# Patient Record
Sex: FEMALE | Race: WHITE | Employment: OTHER | URBAN - METROPOLITAN AREA
[De-identification: names, ages, dates, MRNs, and addresses within clinical notes are randomized per-mention and may not be internally consistent; named-entity substitution may affect disease eponyms.]

---

## 2017-03-28 PROBLEM — E88.819 INSULIN RESISTANCE: Status: ACTIVE | Noted: 2017-03-28

## 2017-03-28 PROBLEM — E88.81 INSULIN RESISTANCE: Status: ACTIVE | Noted: 2017-03-28

## 2017-11-14 ENCOUNTER — APPOINTMENT (OUTPATIENT)
Dept: GENERAL RADIOLOGY | Age: 52
End: 2017-11-14
Payer: COMMERCIAL

## 2017-11-14 ENCOUNTER — HOSPITAL ENCOUNTER (EMERGENCY)
Age: 52
Discharge: HOME | End: 2017-11-14
Attending: EMERGENCY MEDICINE
Payer: COMMERCIAL

## 2017-11-14 ENCOUNTER — APPOINTMENT (OUTPATIENT)
Dept: CT IMAGING | Age: 52
End: 2017-11-14
Payer: COMMERCIAL

## 2017-11-14 VITALS
WEIGHT: 208 LBS | DIASTOLIC BLOOD PRESSURE: 98 MMHG | BODY MASS INDEX: 39.27 KG/M2 | RESPIRATION RATE: 16 BRPM | OXYGEN SATURATION: 96 % | HEART RATE: 74 BPM | HEIGHT: 61 IN | TEMPERATURE: 98.2 F | SYSTOLIC BLOOD PRESSURE: 146 MMHG

## 2017-11-14 DIAGNOSIS — R07.9 CHEST PAIN, UNSPECIFIED TYPE: Primary | ICD-10-CM

## 2017-11-14 LAB
ABSOLUTE EOS #: 0 K/UL (ref 0–0.4)
ABSOLUTE IMMATURE GRANULOCYTE: ABNORMAL K/UL (ref 0–0.3)
ABSOLUTE LYMPH #: 3.54 K/UL (ref 1–4.8)
ABSOLUTE MONO #: 0.54 K/UL (ref 0.2–0.8)
ANION GAP SERPL CALCULATED.3IONS-SCNC: 14 MMOL/L (ref 9–17)
ATYPICAL LYMPHOCYTE ABSOLUTE COUNT: 0.48 K/UL
ATYPICAL LYMPHOCYTES: 8 %
BASOPHILS # BLD: 0 %
BASOPHILS ABSOLUTE: 0 K/UL (ref 0–0.2)
BUN BLDV-MCNC: 16 MG/DL (ref 6–20)
BUN/CREAT BLD: 21 (ref 9–20)
CALCIUM SERPL-MCNC: 9.5 MG/DL (ref 8.6–10.4)
CHLORIDE BLD-SCNC: 99 MMOL/L (ref 98–107)
CO2: 27 MMOL/L (ref 20–31)
CREAT SERPL-MCNC: 0.75 MG/DL (ref 0.5–0.9)
D-DIMER QUANTITATIVE: 1.21 MG/L FEU
DIFFERENTIAL TYPE: ABNORMAL
EKG ATRIAL RATE: 70 BPM
EKG P AXIS: 81 DEGREES
EKG P-R INTERVAL: 154 MS
EKG Q-T INTERVAL: 374 MS
EKG QRS DURATION: 96 MS
EKG QTC CALCULATION (BAZETT): 403 MS
EKG R AXIS: 87 DEGREES
EKG T AXIS: 73 DEGREES
EKG VENTRICULAR RATE: 70 BPM
EOSINOPHILS RELATIVE PERCENT: 0 %
GFR AFRICAN AMERICAN: >60 ML/MIN
GFR NON-AFRICAN AMERICAN: >60 ML/MIN
GFR SERPL CREATININE-BSD FRML MDRD: ABNORMAL ML/MIN/{1.73_M2}
GFR SERPL CREATININE-BSD FRML MDRD: ABNORMAL ML/MIN/{1.73_M2}
GLUCOSE BLD-MCNC: 99 MG/DL (ref 70–99)
HCT VFR BLD CALC: 43.7 % (ref 36–46)
HEMOGLOBIN: 14.8 G/DL (ref 12–16)
IMMATURE GRANULOCYTES: ABNORMAL %
LYMPHOCYTES # BLD: 59 %
MCH RBC QN AUTO: 32.9 PG (ref 26–34)
MCHC RBC AUTO-ENTMCNC: 33.9 G/DL (ref 31–37)
MCV RBC AUTO: 97.1 FL (ref 80–100)
MONOCYTES # BLD: 9 %
MYOGLOBIN: 56 NG/ML (ref 25–58)
PDW BLD-RTO: 14.3 % (ref 11.5–14.5)
PLATELET # BLD: 184 K/UL (ref 130–400)
PLATELET ESTIMATE: ABNORMAL
PMV BLD AUTO: 8 FL (ref 6–12)
POTASSIUM SERPL-SCNC: 3.7 MMOL/L (ref 3.7–5.3)
RBC # BLD: 4.51 M/UL (ref 4–5.2)
RBC # BLD: ABNORMAL 10*6/UL
SEG NEUTROPHILS: 24 %
SEGMENTED NEUTROPHILS ABSOLUTE COUNT: 1.44 K/UL (ref 1.8–7.7)
SODIUM BLD-SCNC: 140 MMOL/L (ref 135–144)
TROPONIN INTERP: NORMAL
TROPONIN T: <0.03 NG/ML
WBC # BLD: 6 K/UL (ref 3.5–11)
WBC # BLD: ABNORMAL 10*3/UL

## 2017-11-14 PROCEDURE — 71020 XR CHEST STANDARD TWO VW: CPT

## 2017-11-14 PROCEDURE — 6360000004 HC RX CONTRAST MEDICATION: Performed by: EMERGENCY MEDICINE

## 2017-11-14 PROCEDURE — 85025 COMPLETE CBC W/AUTO DIFF WBC: CPT

## 2017-11-14 PROCEDURE — 71260 CT THORAX DX C+: CPT

## 2017-11-14 PROCEDURE — 2580000003 HC RX 258: Performed by: EMERGENCY MEDICINE

## 2017-11-14 PROCEDURE — 80048 BASIC METABOLIC PNL TOTAL CA: CPT

## 2017-11-14 PROCEDURE — 85379 FIBRIN DEGRADATION QUANT: CPT

## 2017-11-14 PROCEDURE — 93005 ELECTROCARDIOGRAM TRACING: CPT

## 2017-11-14 PROCEDURE — 84484 ASSAY OF TROPONIN QUANT: CPT

## 2017-11-14 PROCEDURE — 83874 ASSAY OF MYOGLOBIN: CPT

## 2017-11-14 PROCEDURE — 99285 EMERGENCY DEPT VISIT HI MDM: CPT

## 2017-11-14 RX ORDER — SODIUM CHLORIDE 0.9 % (FLUSH) 0.9 %
10 SYRINGE (ML) INJECTION PRN
Status: DISCONTINUED | OUTPATIENT
Start: 2017-11-14 | End: 2017-11-15 | Stop reason: HOSPADM

## 2017-11-14 RX ORDER — 0.9 % SODIUM CHLORIDE 0.9 %
50 INTRAVENOUS SOLUTION INTRAVENOUS ONCE
Status: COMPLETED | OUTPATIENT
Start: 2017-11-14 | End: 2017-11-14

## 2017-11-14 RX ADMIN — Medication 10 ML: at 21:33

## 2017-11-14 RX ADMIN — SODIUM CHLORIDE 50 ML: 9 INJECTION, SOLUTION INTRAVENOUS at 21:33

## 2017-11-14 RX ADMIN — IOPAMIDOL 100 ML: 755 INJECTION, SOLUTION INTRAVENOUS at 21:35

## 2017-11-14 ASSESSMENT — ENCOUNTER SYMPTOMS
NAUSEA: 0
COUGH: 0
DIARRHEA: 0
RHINORRHEA: 0
VOMITING: 0
ABDOMINAL PAIN: 0
SHORTNESS OF BREATH: 0
SINUS PRESSURE: 0
SORE THROAT: 0
COLOR CHANGE: 0

## 2017-11-14 ASSESSMENT — PAIN DESCRIPTION - PAIN TYPE: TYPE: ACUTE PAIN

## 2017-11-14 ASSESSMENT — PAIN DESCRIPTION - LOCATION: LOCATION: CHEST

## 2017-11-14 ASSESSMENT — PAIN SCALES - GENERAL: PAINLEVEL_OUTOF10: 5

## 2017-11-15 NOTE — ED PROVIDER NOTES
[facility] ED  eMERGENCY dEPARTMENT eNCOUnter      Pt Name: Mago Baez  MRN: [id number]  [place] 1965  Date of evaluation: 11/14/2017  Provider: Kyara Blanco NP    CHIEF COMPLAINT       Chief Complaint   Patient presents with    Chest Pain     since saturday on and off         HISTORY OF PRESENT ILLNESS  (Location/Symptom, Timing/Onset, Context/Setting, Quality, Duration, Modifying Factors, Severity.)   Mago Baez is a 54 y.o. female who presents to the emergency department via private auto for mid chest and left neck pain. Onset was 11/12/17. It has been intermittent. States she returned from a 7 day cruise 11/12/17. Her flight was 4 hours. Reports fatigue. States she was also recently treated with Levaquin for bronchitis. Denies fever, chills, dizziness, headache. She had a mild cough today. Reports edema on the plane. States while on the cruise she consumed a large amount of alcohol which is unusual for her. Rates her pain 5/10 at this time. Nursing Notes were reviewed. ALLERGIES     Latex; Beef-derived products;  Clonidine derivatives; and Demerol hcl [meperidine]    CURRENT MEDICATIONS       Discharge Medication List as of 11/14/2017 10:10 PM      CONTINUE these medications which have NOT CHANGED    Details   meloxicam (MOBIC) 15 MG tablet TAKE ONE TABLET BY MOUTH DAILY, Disp-30 tablet, R-5Normal      acyclovir (ZOVIRAX) 5 % ointment Apply topically, Topical, Starting 8/22/2017, Until Discontinued, Historical Med      albuterol sulfate  (90 Base) MCG/ACT inhaler Historical Med      lidocaine (XYLOCAINE) 2 % jelly Historical Med      hydrochlorothiazide (HYDRODIURIL) 50 MG tablet TAKE ONE TABLET BY MOUTH DAILY, Disp-30 tablet, R-5Normal      atomoxetine (STRATTERA) 100 MG capsule Take 1 capsule by mouth daily, Disp-90 capsule, R-1Normal      oxybutynin (DITROPAN XL) 15 MG extended release tablet TAKE ONE TABLET BY MOUTH DAILY, Disp-30 tablet, R-0Normal valACYclovir (VALTREX) 500 MG tablet Historical Med      imipramine (TOFRANIL-PM) 75 MG capsule Historical Med      metFORMIN (GLUCOPHAGE-XR) 500 MG extended release tablet Take 500 mg by mouth daily (with breakfast) Historical Med      neomycin-polymyxin-dexameth [id number] OINT Starting 12/20/2016, Until Discontinued, Historical Med      fluticasone (FLONASE) 50 MCG/ACT nasal spray 2 sprays by Nasal route daily, Disp-1 Bottle, R-3      levothyroxine (SYNTHROID) 300 MCG tablet TAKE ONE TABLET BY MOUTH DAILY, Disp-30 tablet, R-11      triamcinolone (KENALOG) 0.1 % cream as needed , PRN Starting 8/3/2015, Until Discontinued, Historical Med      traMADol (ULTRAM) 50 MG tablet   Take 50 mg by mouth every 6 hours as needed       ibuprofen (ADVIL;MOTRIN) 600 MG tablet Take 1 tablet by mouth every 8 hours as needed for Pain., Disp-90 tablet, R-3      loperamide (IMODIUM) 2 MG capsule Take 1 capsule by mouth 3 times daily Historical Med      sodium chloride 0.9 % irrigation Historical Med             PAST MEDICAL HISTORY         Diagnosis Date    Bowel and bladder incontinence     pt is on a bowel maintance program     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 and had anal pullthrough at 12    Diabetes mellitus ([facility] [address]     Essential hypertension 9/26/2016    Insulin resistance 3/28/2017       SURGICAL HISTORY           Procedure Laterality Date    ANKLE FRACTURE SURGERY Left 2007    then hardware removed in 2011   [address]    anal pullthrough    KNEE ARTHROSCOPY Left 1994, 2001, 2007    meniscal tear, lateral release, scraped arthritis    SPINE SURGERY  untethered with laminectomy 1994, 2003    VAGINA RECONSTRUCTION SURGERY  1986         FAMILY HISTORY           Problem Relation Age of Onset    Other Mother      tremors     Arthritis Mother     Heart Disease Father     Diabetes Father     Cancer Father      prostate, skin and kidney     Arthritis Father     Other Sister Cardiovascular: Normal rate, regular rhythm and normal heart sounds. Pulmonary/Chest: Effort normal and breath sounds normal. No respiratory distress. She has no wheezes. She has no rales. Abdominal: Soft. Bowel sounds are normal. She exhibits no distension. There is no tenderness. Musculoskeletal: She exhibits no edema. Cervical back: She exhibits pain. She exhibits no tenderness and no bony tenderness. Lymphadenopathy:     She has no cervical adenopathy. Neurological: She is alert and oriented to person, place, and time. Skin: Skin is warm and dry. No rash noted. She is not diaphoretic. Psychiatric: She has a normal mood and affect. Her behavior is normal.   Vitals reviewed. DIAGNOSTIC RESULTS     EKG: All EKG's are interpreted by the Emergency Department Physician who either signs or Co-signs this chart in the absence of a cardiologist.  EKG was interpreted by Dr. Abi Micthell after completion. RADIOLOGY:   Non-plain film images such as CT, Ultrasound and MRI are read by the radiologist. [facility] radiographic images are visualized and preliminarily interpreted by the emergency physician with the below findings:    Interpretation per the Radiologist below, if available at the time of this note:    Xr Chest Standard (2 Vw)    Result Date: 11/14/2017  EXAMINATION: TWO VIEWS OF THE CHEST 11/14/2017 8:28 pm COMPARISON: 09/30/2015 HISTORY: ORDERING SYSTEM PROVIDED HISTORY:  TECHNOLOGIST PROVIDED HISTORY: Reason for exam:-> Ordering Physician Provided Reason for Exam: chest pain Acuity: Acute Type of Exam: Initial FINDINGS: Frontal and lateral views of the chest are submitted for review. The cardiac silhouette is normal in size. Lung parenchyma is clear without focal airspace consolidation, sizeable pleural effusion, or pneumothorax. Trachea is midline. Osseous structures and soft tissues are grossly intact. No acute cardiopulmonary pathology.      Ct Chest Pulmonary Embolism W Contrast    Result Date: 11/14/2017  EXAMINATION: CTA OF THE CHEST 11/14/2017 9:36 pm TECHNIQUE: CTA of the chest was performed after the administration of intravenous contrast.  Multiplanar reformatted images are provided for review. MIP images are provided for review. Dose modulation, iterative reconstruction, and/or weight based adjustment of the mA/kV was utilized to reduce the radiation dose to as low as reasonably achievable. COMPARISON: None. HISTORY: ORDERING SYSTEM PROVIDED HISTORY: r/o PE FINDINGS: Pulmonary Arteries: Pulmonary arteries are adequately opacified for evaluation. No evidence of intraluminal filling defect to suggest pulmonary embolism. Main pulmonary artery is normal in caliber. Mediastinum: Multiple mildly prominent mesenteric lymph nodes without discrete lymphadenopathy. The heart and pericardium demonstrate no acute abnormality. There is no acute abnormality of the thoracic aorta. Lungs/pleura: The lungs are without acute process. No focal consolidation or pulmonary edema. No evidence of pleural effusion or pneumothorax. Upper Abdomen: Limited images of the upper abdomen are unremarkable. Soft Tissues/Bones: No acute bone or soft tissue abnormality. No evidence of pulmonary embolism or acute pulmonary abnormality. LABS:  Labs Reviewed   BASIC METABOLIC PANEL - Abnormal; Notable for the following:        Result Value    Bun/Cre Ratio 21 (*)     All other components within normal limits   CBC WITH AUTO DIFFERENTIAL - Abnormal; Notable for the following:     Segs Absolute 1.44 (*)     All other components within normal limits   D-DIMER, QUANTITATIVE   TROP/MYOGLOBIN       All other labs were within normal range or not returned as of this dictation.     EMERGENCY DEPARTMENT COURSE and DIFFERENTIAL DIAGNOSIS/MDM:   Vitals:    Vitals:    11/14/17 2016   BP: (!) 146/98   Pulse: 74   Resp: 16   Temp: 98.2 °F (36.8 °C)   TempSrc: Oral   SpO2: 96%   Weight: 208 lb (94.3 kg)   Height:

## 2017-11-20 ENCOUNTER — HOSPITAL ENCOUNTER (OUTPATIENT)
Age: 52
Setting detail: SPECIMEN
Discharge: HOME | End: 2017-11-20
Payer: COMMERCIAL

## 2017-11-20 DIAGNOSIS — R00.2 HEART PALPITATIONS: ICD-10-CM

## 2017-11-20 DIAGNOSIS — E03.9 HYPOTHYROIDISM, UNSPECIFIED TYPE: ICD-10-CM

## 2017-11-21 LAB
-: NORMAL
THYROXINE, FREE: 1.77 NG/DL (ref 0.93–1.7)
TSH SERPL DL<=0.05 MIU/L-ACNC: 0.25 MIU/L (ref 0.3–5)

## 2018-03-21 PROBLEM — E55.9 VITAMIN D DEFICIENCY: Status: ACTIVE | Noted: 2018-03-21

## 2018-03-21 PROBLEM — Z72.0 TOBACCO ABUSE: Status: ACTIVE | Noted: 2018-03-21

## 2018-07-18 DIAGNOSIS — M25.562 CHRONIC PAIN OF BOTH KNEES: Primary | ICD-10-CM

## 2018-07-18 DIAGNOSIS — G89.29 CHRONIC PAIN OF BOTH KNEES: Primary | ICD-10-CM

## 2018-07-18 DIAGNOSIS — M25.561 CHRONIC PAIN OF BOTH KNEES: Primary | ICD-10-CM

## 2018-07-20 ENCOUNTER — OFFICE VISIT (OUTPATIENT)
Dept: ORTHOPEDIC SURGERY | Age: 53
End: 2018-07-20
Payer: COMMERCIAL

## 2018-07-20 VITALS — HEIGHT: 62 IN | WEIGHT: 215.61 LBS | BODY MASS INDEX: 39.68 KG/M2

## 2018-07-20 DIAGNOSIS — M17.12 ARTHRITIS OF LEFT KNEE: Primary | ICD-10-CM

## 2018-07-20 DIAGNOSIS — M17.11 ARTHRITIS OF RIGHT KNEE: ICD-10-CM

## 2018-07-20 PROCEDURE — G8427 DOCREV CUR MEDS BY ELIG CLIN: HCPCS | Performed by: ORTHOPAEDIC SURGERY

## 2018-07-20 PROCEDURE — 4004F PT TOBACCO SCREEN RCVD TLK: CPT | Performed by: ORTHOPAEDIC SURGERY

## 2018-07-20 PROCEDURE — 3017F COLORECTAL CA SCREEN DOC REV: CPT | Performed by: ORTHOPAEDIC SURGERY

## 2018-07-20 PROCEDURE — 99204 OFFICE O/P NEW MOD 45 MIN: CPT | Performed by: ORTHOPAEDIC SURGERY

## 2018-07-20 PROCEDURE — G8417 CALC BMI ABV UP PARAM F/U: HCPCS | Performed by: ORTHOPAEDIC SURGERY

## 2018-07-20 ASSESSMENT — ENCOUNTER SYMPTOMS
ABDOMINAL PAIN: 0
CHEST TIGHTNESS: 0
COUGH: 0
VOMITING: 0
APNEA: 0

## 2018-07-20 NOTE — PROGRESS NOTES
1984       Current Medications:   Current Outpatient Prescriptions   Medication Sig Dispense Refill    EUFLEXXA 20 MG/2ML SOSY injection       metoprolol-hydrochlorothiazide (LOPRESSOR HCT) 100-25 MG per tablet Take 1 tablet by mouth daily 90 tablet 1    atomoxetine (STRATTERA) 100 MG capsule Take 1 capsule by mouth daily 90 capsule 1    meloxicam (MOBIC) 15 MG tablet Take 1 tablet by mouth daily 90 tablet 1    oxybutynin (DITROPAN XL) 15 MG extended release tablet Take 1 tablet by mouth daily 90 tablet 1    fluticasone (FLONASE) 50 MCG/ACT nasal spray 1 spray by Nasal route daily 3 Bottle 0    NICODERM CQ 21 MG/24HR Place 1 patch onto the skin every 24 hours 42 patch 0    Cholecalciferol (VITAMIN D) 2000 units TABS tablet Take 2,000 Units by mouth daily      acyclovir (ZOVIRAX) 5 % ointment Apply topically      lidocaine (XYLOCAINE) 2 % jelly       valACYclovir (VALTREX) 500 MG tablet       imipramine (TOFRANIL-PM) 75 MG capsule       metFORMIN (GLUCOPHAGE-XR) 500 MG extended release tablet Take 500 mg by mouth daily (with breakfast)       levothyroxine (SYNTHROID) 300 MCG tablet TAKE ONE TABLET BY MOUTH DAILY (Patient taking differently: TAKE ONE TABLET BY MOUTH DAILY) 30 tablet 11    traMADol (ULTRAM) 50 MG tablet   Take 50 mg by mouth every 6 hours as needed       loperamide (IMODIUM) 2 MG capsule Take 1 capsule by mouth 3 times daily        No current facility-administered medications for this visit. Allergies:    Latex; Beef-derived products; Clonidine derivatives; and Demerol hcl [meperidine]    Social History:   Social History     Social History    Marital status: Single     Spouse name: N/A    Number of children: N/A    Years of education: N/A     Occupational History    Not on file.      Social History Main Topics    Smoking status: Current Some Day Smoker     Types: Cigarettes     Last attempt to quit: 8/30/2015    Smokeless tobacco: Never Used    Alcohol use Yes    Drug use: No    Sexual activity: Not on file     Other Topics Concern    Not on file     Social History Narrative    No narrative on file       Family History:  Family History   Problem Relation Age of Onset    Other Mother         tremors     Arthritis Mother     Heart Disease Father     Diabetes Father     Cancer Father         prostate, skin and kidney     Arthritis Father     Other Sister     Other Brother         seziure     Other Sister         MS     Asthma Brother     Mental Retardation Neg Hx          REVIEW OF SYSTEMS:  Review of Systems   Constitutional: Negative for chills and fever. Respiratory: Negative for apnea, cough and chest tightness. Cardiovascular: Negative for chest pain and palpitations. Gastrointestinal: Negative for abdominal pain and vomiting. Genitourinary: Negative for difficulty urinating. Musculoskeletal: Positive for arthralgias (Bilateral knees). Negative for gait problem, joint swelling and myalgias. Neurological: Negative for dizziness, weakness and numbness. I have reviewed the CC, HPI, ROS, PMH, FHX, Social History. I agree with the documentation provided by other staff, residents, and have reviewed their documentation prior to providing my signature indicating agreement. PHYSICAL EXAM:  Ht 5' 1.5\" (1.562 m)   Wt 215 lb 9.8 oz (97.8 kg)   BMI 40.08 kg/m²   Physical Exam  Gen: alert and oriented  Psych:  Appropriate affect; Appropriate knowledge base; Appropriate mood; No hallucinations; Head: normocephalic atraumatic   Chest: symmetric chest excursion  Pelvis: stable  Ortho Exam  Extremity: Evaluation of the Right knee reveals no significant outward deformity. There is no erythema, warmth, skin lesions, signs of infection. There is tenderness over the lateral joint line. There is a mildknee effusion. Range of motion of the Right knee is 5-110. No instability of the knee is appreciated at 0 and 30° of flexion.   There is a negative anterior Arthritis of right knee         PLAN:       X-rays reviewed with the patient today in the office. Discussed etiology and natural history of bilateral knee arthritis. The treatment options may include oral anti-inflammatories, bracing, injections, advanced imaging, activity modification, physical therapy and/or surgical intervention. Risks and benefits of knee replacement discussed today in the office. The patient would like to proceed with waiting to decide to have a knee replacement in the future. The patient will follow up in the office when she decides if she wants to have a knee replacement. We discussed that the patient should call us with any concerns or questions. Return if symptoms worsen or fail to improve. No orders of the defined types were placed in this encounter. No orders of the defined types were placed in this encounter. Macey Tanner LPN am scribing for and in the presence of Dr. Rachell Tan  7/23/2018 10:36 AM     I have reviewed and made changes accordingly to the work scribed by Brandon Lin LPN. The documentation accurately reflects work and decisions made by me. I have also reviewed documentation completed by clinical staff.     Rachell Tan DO, [facility]  7/23/2018 10:37 AM    This note is created with the assistance of a speech recognition program.  While intending to generate a document that actually reflects the content of the visit, the document can still have some errors including those of syntax and sound a like substitutions which may escape proof reading.  It such instances, actual meaning can be extrapolated by contextual diversion      Electronically signed by Yelena Tomas DO, FLIP on 7/23/2018 at 10:36 AM

## 2018-09-06 ENCOUNTER — HOSPITAL ENCOUNTER (INPATIENT)
Age: 53
LOS: 2 days | Discharge: HOME | DRG: 247 | End: 2018-09-08
Attending: EMERGENCY MEDICINE | Admitting: FAMILY MEDICINE
Payer: COMMERCIAL

## 2018-09-06 ENCOUNTER — APPOINTMENT (OUTPATIENT)
Dept: GENERAL RADIOLOGY | Age: 53
DRG: 247 | End: 2018-09-06
Payer: COMMERCIAL

## 2018-09-06 DIAGNOSIS — R07.89 CHEST WALL PAIN: Primary | ICD-10-CM

## 2018-09-06 DIAGNOSIS — I21.4 NSTEMI (NON-ST ELEVATED MYOCARDIAL INFARCTION) (HCC): ICD-10-CM

## 2018-09-06 PROBLEM — R07.9 CHEST PAIN: Status: ACTIVE | Noted: 2018-09-06

## 2018-09-06 LAB
ABSOLUTE EOS #: 0.1 K/UL (ref 0–0.4)
ABSOLUTE IMMATURE GRANULOCYTE: NORMAL K/UL (ref 0–0.3)
ABSOLUTE LYMPH #: 2.5 K/UL (ref 1–4.8)
ABSOLUTE MONO #: 0.4 K/UL (ref 0.2–0.8)
ANION GAP SERPL CALCULATED.3IONS-SCNC: 12 MMOL/L (ref 9–17)
BASOPHILS # BLD: 0 % (ref 0–2)
BASOPHILS ABSOLUTE: 0 K/UL (ref 0–0.2)
BNP INTERPRETATION: NORMAL
BUN BLDV-MCNC: 19 MG/DL (ref 6–20)
BUN/CREAT BLD: 22 (ref 9–20)
CALCIUM SERPL-MCNC: 9.4 MG/DL (ref 8.6–10.4)
CHLORIDE BLD-SCNC: 102 MMOL/L (ref 98–107)
CO2: 26 MMOL/L (ref 20–31)
CREAT SERPL-MCNC: 0.85 MG/DL (ref 0.5–0.9)
DIFFERENTIAL TYPE: NORMAL
EOSINOPHILS RELATIVE PERCENT: 2 % (ref 1–4)
GFR AFRICAN AMERICAN: >60 ML/MIN
GFR NON-AFRICAN AMERICAN: >60 ML/MIN
GFR SERPL CREATININE-BSD FRML MDRD: ABNORMAL ML/MIN/{1.73_M2}
GFR SERPL CREATININE-BSD FRML MDRD: ABNORMAL ML/MIN/{1.73_M2}
GLUCOSE BLD-MCNC: 203 MG/DL (ref 70–99)
HCT VFR BLD CALC: 42.8 % (ref 36–46)
HEMOGLOBIN: 14.5 G/DL (ref 12–16)
IMMATURE GRANULOCYTES: NORMAL %
LYMPHOCYTES # BLD: 33 % (ref 24–44)
MCH RBC QN AUTO: 33.5 PG (ref 26–34)
MCHC RBC AUTO-ENTMCNC: 33.9 G/DL (ref 31–37)
MCV RBC AUTO: 98.9 FL (ref 80–100)
MONOCYTES # BLD: 6 % (ref 1–7)
MYOGLOBIN: 34 NG/ML (ref 25–58)
MYOGLOBIN: 47 NG/ML (ref 25–58)
MYOGLOBIN: 67 NG/ML (ref 25–58)
NRBC AUTOMATED: NORMAL PER 100 WBC
PARTIAL THROMBOPLASTIN TIME: 26.2 SEC (ref 23–31)
PDW BLD-RTO: 13.3 % (ref 11.5–14.5)
PLATELET # BLD: 251 K/UL (ref 130–400)
PLATELET ESTIMATE: NORMAL
PMV BLD AUTO: 8.4 FL (ref 6–12)
POTASSIUM SERPL-SCNC: 3.5 MMOL/L (ref 3.7–5.3)
PRO-BNP: 26 PG/ML
RBC # BLD: 4.33 M/UL (ref 4–5.2)
RBC # BLD: NORMAL 10*6/UL
SEG NEUTROPHILS: 59 % (ref 36–66)
SEGMENTED NEUTROPHILS ABSOLUTE COUNT: 4.3 K/UL (ref 1.8–7.7)
SODIUM BLD-SCNC: 140 MMOL/L (ref 135–144)
TROPONIN INTERP: ABNORMAL
TROPONIN INTERP: ABNORMAL
TROPONIN INTERP: NORMAL
TROPONIN T: 0.13 NG/ML
TROPONIN T: 0.14 NG/ML
TROPONIN T: <0.03 NG/ML
TSH SERPL DL<=0.05 MIU/L-ACNC: 1.77 MIU/L (ref 0.3–5)
WBC # BLD: 7.4 K/UL (ref 3.5–11)
WBC # BLD: NORMAL 10*3/UL

## 2018-09-06 PROCEDURE — 6360000002 HC RX W HCPCS: Performed by: INTERNAL MEDICINE

## 2018-09-06 PROCEDURE — 93005 ELECTROCARDIOGRAM TRACING: CPT

## 2018-09-06 PROCEDURE — 99222 1ST HOSP IP/OBS MODERATE 55: CPT | Performed by: FAMILY MEDICINE

## 2018-09-06 PROCEDURE — 6360000002 HC RX W HCPCS: Performed by: NURSE PRACTITIONER

## 2018-09-06 PROCEDURE — 80048 BASIC METABOLIC PNL TOTAL CA: CPT

## 2018-09-06 PROCEDURE — 84484 ASSAY OF TROPONIN QUANT: CPT

## 2018-09-06 PROCEDURE — 83874 ASSAY OF MYOGLOBIN: CPT

## 2018-09-06 PROCEDURE — 2580000003 HC RX 258: Performed by: FAMILY MEDICINE

## 2018-09-06 PROCEDURE — 71045 X-RAY EXAM CHEST 1 VIEW: CPT

## 2018-09-06 PROCEDURE — 6370000000 HC RX 637 (ALT 250 FOR IP): Performed by: NURSE PRACTITIONER

## 2018-09-06 PROCEDURE — 36415 COLL VENOUS BLD VENIPUNCTURE: CPT

## 2018-09-06 PROCEDURE — 6370000000 HC RX 637 (ALT 250 FOR IP): Performed by: FAMILY MEDICINE

## 2018-09-06 PROCEDURE — 83880 ASSAY OF NATRIURETIC PEPTIDE: CPT

## 2018-09-06 PROCEDURE — 85730 THROMBOPLASTIN TIME PARTIAL: CPT

## 2018-09-06 PROCEDURE — 84443 ASSAY THYROID STIM HORMONE: CPT

## 2018-09-06 PROCEDURE — 1200000000 HC SEMI PRIVATE

## 2018-09-06 PROCEDURE — 99285 EMERGENCY DEPT VISIT HI MDM: CPT

## 2018-09-06 PROCEDURE — 85025 COMPLETE CBC W/AUTO DIFF WBC: CPT

## 2018-09-06 RX ORDER — HEPARIN SODIUM 1000 [USP'U]/ML
4000 INJECTION, SOLUTION INTRAVENOUS; SUBCUTANEOUS ONCE
Status: COMPLETED | OUTPATIENT
Start: 2018-09-06 | End: 2018-09-06

## 2018-09-06 RX ORDER — 0.9 % SODIUM CHLORIDE 0.9 %
250 INTRAVENOUS SOLUTION INTRAVENOUS ONCE
Status: DISCONTINUED | OUTPATIENT
Start: 2018-09-06 | End: 2018-09-06

## 2018-09-06 RX ORDER — MORPHINE SULFATE 4 MG/ML
4 INJECTION, SOLUTION INTRAMUSCULAR; INTRAVENOUS ONCE
Status: DISCONTINUED | OUTPATIENT
Start: 2018-09-06 | End: 2018-09-08 | Stop reason: HOSPADM

## 2018-09-06 RX ORDER — POTASSIUM CHLORIDE 20 MEQ/1
40 TABLET, EXTENDED RELEASE ORAL PRN
Status: DISCONTINUED | OUTPATIENT
Start: 2018-09-06 | End: 2018-09-08 | Stop reason: HOSPADM

## 2018-09-06 RX ORDER — ONDANSETRON 2 MG/ML
4 INJECTION INTRAMUSCULAR; INTRAVENOUS EVERY 6 HOURS PRN
Status: DISCONTINUED | OUTPATIENT
Start: 2018-09-06 | End: 2018-09-08 | Stop reason: HOSPADM

## 2018-09-06 RX ORDER — SODIUM CHLORIDE 0.9 % (FLUSH) 0.9 %
10 SYRINGE (ML) INJECTION PRN
Status: DISCONTINUED | OUTPATIENT
Start: 2018-09-06 | End: 2018-09-07 | Stop reason: SDUPTHER

## 2018-09-06 RX ORDER — MORPHINE SULFATE 2 MG/ML
2 INJECTION, SOLUTION INTRAMUSCULAR; INTRAVENOUS ONCE
Status: COMPLETED | OUTPATIENT
Start: 2018-09-06 | End: 2018-09-06

## 2018-09-06 RX ORDER — FLUTICASONE PROPIONATE 50 MCG
1 SPRAY, SUSPENSION (ML) NASAL DAILY PRN
Status: DISCONTINUED | OUTPATIENT
Start: 2018-09-06 | End: 2018-09-08 | Stop reason: HOSPADM

## 2018-09-06 RX ORDER — ATOMOXETINE 100 MG/1
100 CAPSULE ORAL DAILY
Status: DISCONTINUED | OUTPATIENT
Start: 2018-09-06 | End: 2018-09-08 | Stop reason: HOSPADM

## 2018-09-06 RX ORDER — METFORMIN HYDROCHLORIDE 500 MG/1
500 TABLET, EXTENDED RELEASE ORAL
Status: DISCONTINUED | OUTPATIENT
Start: 2018-09-06 | End: 2018-09-07

## 2018-09-06 RX ORDER — ASPIRIN 81 MG/1
81 TABLET, CHEWABLE ORAL DAILY
Status: DISCONTINUED | OUTPATIENT
Start: 2018-09-07 | End: 2018-09-08 | Stop reason: HOSPADM

## 2018-09-06 RX ORDER — ATORVASTATIN CALCIUM 40 MG/1
40 TABLET, FILM COATED ORAL NIGHTLY
Status: DISCONTINUED | OUTPATIENT
Start: 2018-09-06 | End: 2018-09-07

## 2018-09-06 RX ORDER — HYDROCHLOROTHIAZIDE 25 MG/1
25 TABLET ORAL DAILY
Status: DISCONTINUED | OUTPATIENT
Start: 2018-09-06 | End: 2018-09-06

## 2018-09-06 RX ORDER — POTASSIUM CHLORIDE 7.45 MG/ML
10 INJECTION INTRAVENOUS PRN
Status: DISCONTINUED | OUTPATIENT
Start: 2018-09-06 | End: 2018-09-08 | Stop reason: HOSPADM

## 2018-09-06 RX ORDER — METOPROLOL TARTRATE 5 MG/5ML
2.5 INJECTION INTRAVENOUS PRN
Status: ACTIVE | OUTPATIENT
Start: 2018-09-07 | End: 2018-09-08

## 2018-09-06 RX ORDER — METOPROLOL TARTRATE AND HYDROCHLOROTHIAZIDE 100; 25 MG/1; MG/1
1 TABLET ORAL DAILY
Status: DISCONTINUED | OUTPATIENT
Start: 2018-09-06 | End: 2018-09-06 | Stop reason: CLARIF

## 2018-09-06 RX ORDER — IMIPRAMINE PAMOATE 75 MG
75 CAPSULE ORAL NIGHTLY
Status: DISCONTINUED | OUTPATIENT
Start: 2018-09-06 | End: 2018-09-08 | Stop reason: HOSPADM

## 2018-09-06 RX ORDER — LEVOTHYROXINE SODIUM 300 UG/1
200 TABLET ORAL
COMMUNITY
End: 2019-08-28

## 2018-09-06 RX ORDER — ACETAMINOPHEN 325 MG/1
650 TABLET ORAL EVERY 4 HOURS PRN
Status: DISCONTINUED | OUTPATIENT
Start: 2018-09-06 | End: 2018-09-07 | Stop reason: SDUPTHER

## 2018-09-06 RX ORDER — SODIUM CHLORIDE 0.9 % (FLUSH) 0.9 %
10 SYRINGE (ML) INJECTION PRN
Status: DISCONTINUED | OUTPATIENT
Start: 2018-09-07 | End: 2018-09-07 | Stop reason: SDUPTHER

## 2018-09-06 RX ORDER — NITROGLYCERIN 0.4 MG/1
0.4 TABLET SUBLINGUAL EVERY 5 MIN PRN
Status: DISCONTINUED | OUTPATIENT
Start: 2018-09-06 | End: 2018-09-06

## 2018-09-06 RX ORDER — SODIUM CHLORIDE 9 MG/ML
INJECTION, SOLUTION INTRAVENOUS CONTINUOUS
Status: DISCONTINUED | OUTPATIENT
Start: 2018-09-06 | End: 2018-09-07 | Stop reason: SDUPTHER

## 2018-09-06 RX ORDER — FLUTICASONE PROPIONATE 50 MCG
1 SPRAY, SUSPENSION (ML) NASAL DAILY
Status: DISCONTINUED | OUTPATIENT
Start: 2018-09-06 | End: 2018-09-06

## 2018-09-06 RX ORDER — MAGNESIUM SULFATE 1 G/100ML
1 INJECTION INTRAVENOUS PRN
Status: DISCONTINUED | OUTPATIENT
Start: 2018-09-06 | End: 2018-09-08 | Stop reason: HOSPADM

## 2018-09-06 RX ORDER — FLUTICASONE PROPIONATE 50 MCG
1 SPRAY, SUSPENSION (ML) NASAL DAILY PRN
COMMUNITY
End: 2019-09-05

## 2018-09-06 RX ORDER — ONDANSETRON 2 MG/ML
4 INJECTION INTRAMUSCULAR; INTRAVENOUS ONCE
Status: DISCONTINUED | OUTPATIENT
Start: 2018-09-06 | End: 2018-09-06 | Stop reason: SDUPTHER

## 2018-09-06 RX ORDER — METOPROLOL TARTRATE 100 MG/1
100 TABLET ORAL DAILY
Status: DISCONTINUED | OUTPATIENT
Start: 2018-09-06 | End: 2018-09-07

## 2018-09-06 RX ORDER — AMINOPHYLLINE DIHYDRATE 25 MG/ML
100 INJECTION, SOLUTION INTRAVENOUS
Status: DISCONTINUED | OUTPATIENT
Start: 2018-09-06 | End: 2018-09-06

## 2018-09-06 RX ORDER — LEVOTHYROXINE SODIUM 0.15 MG/1
300 TABLET ORAL
Status: DISCONTINUED | OUTPATIENT
Start: 2018-09-07 | End: 2018-09-08 | Stop reason: HOSPADM

## 2018-09-06 RX ORDER — TRAMADOL HYDROCHLORIDE 50 MG/1
50 TABLET ORAL EVERY 6 HOURS PRN
Status: DISCONTINUED | OUTPATIENT
Start: 2018-09-06 | End: 2018-09-08 | Stop reason: HOSPADM

## 2018-09-06 RX ORDER — SODIUM CHLORIDE 0.9 % (FLUSH) 0.9 %
10 SYRINGE (ML) INJECTION EVERY 12 HOURS SCHEDULED
Status: DISCONTINUED | OUTPATIENT
Start: 2018-09-06 | End: 2018-09-07 | Stop reason: SDUPTHER

## 2018-09-06 RX ORDER — MORPHINE SULFATE 2 MG/ML
2 INJECTION, SOLUTION INTRAMUSCULAR; INTRAVENOUS EVERY 4 HOURS PRN
Status: DISCONTINUED | OUTPATIENT
Start: 2018-09-06 | End: 2018-09-08 | Stop reason: HOSPADM

## 2018-09-06 RX ORDER — NICOTINE 21 MG/24HR
1 PATCH, TRANSDERMAL 24 HOURS TRANSDERMAL EVERY 24 HOURS
Status: DISCONTINUED | OUTPATIENT
Start: 2018-09-06 | End: 2018-09-08 | Stop reason: HOSPADM

## 2018-09-06 RX ORDER — HEPARIN SODIUM 10000 [USP'U]/100ML
1000 INJECTION, SOLUTION INTRAVENOUS CONTINUOUS
Status: DISCONTINUED | OUTPATIENT
Start: 2018-09-06 | End: 2018-09-07

## 2018-09-06 RX ORDER — HEPARIN SODIUM 1000 [USP'U]/ML
4000 INJECTION, SOLUTION INTRAVENOUS; SUBCUTANEOUS PRN
Status: DISCONTINUED | OUTPATIENT
Start: 2018-09-06 | End: 2018-09-07

## 2018-09-06 RX ORDER — HEPARIN SODIUM 1000 [USP'U]/ML
2000 INJECTION, SOLUTION INTRAVENOUS; SUBCUTANEOUS PRN
Status: DISCONTINUED | OUTPATIENT
Start: 2018-09-06 | End: 2018-09-07

## 2018-09-06 RX ORDER — ASPIRIN 81 MG/1
324 TABLET, CHEWABLE ORAL ONCE
Status: COMPLETED | OUTPATIENT
Start: 2018-09-06 | End: 2018-09-06

## 2018-09-06 RX ORDER — NITROGLYCERIN 0.4 MG/1
0.4 TABLET SUBLINGUAL EVERY 5 MIN PRN
Status: DISCONTINUED | OUTPATIENT
Start: 2018-09-06 | End: 2018-09-08 | Stop reason: HOSPADM

## 2018-09-06 RX ORDER — ONDANSETRON 4 MG/1
4 TABLET, ORALLY DISINTEGRATING ORAL EVERY 6 HOURS PRN
Status: DISCONTINUED | OUTPATIENT
Start: 2018-09-06 | End: 2018-09-08 | Stop reason: HOSPADM

## 2018-09-06 RX ORDER — POTASSIUM CHLORIDE 20MEQ/15ML
40 LIQUID (ML) ORAL PRN
Status: DISCONTINUED | OUTPATIENT
Start: 2018-09-06 | End: 2018-09-08 | Stop reason: HOSPADM

## 2018-09-06 RX ORDER — BISACODYL 10 MG
10 SUPPOSITORY, RECTAL RECTAL DAILY PRN
Status: DISCONTINUED | OUTPATIENT
Start: 2018-09-06 | End: 2018-09-08 | Stop reason: HOSPADM

## 2018-09-06 RX ORDER — ONDANSETRON 2 MG/ML
4 INJECTION INTRAMUSCULAR; INTRAVENOUS EVERY 6 HOURS PRN
Status: DISCONTINUED | OUTPATIENT
Start: 2018-09-06 | End: 2018-09-06 | Stop reason: ALTCHOICE

## 2018-09-06 RX ADMIN — VITAMIN D, TAB 1000IU (100/BT) 2000 UNITS: 25 TAB at 18:11

## 2018-09-06 RX ADMIN — METOPROLOL TARTRATE 100 MG: 100 TABLET ORAL at 18:11

## 2018-09-06 RX ADMIN — METFORMIN HYDROCHLORIDE 500 MG: 500 TABLET, EXTENDED RELEASE ORAL at 18:11

## 2018-09-06 RX ADMIN — HEPARIN SODIUM AND DEXTROSE 10.16 UNITS/KG/HR: 10000; 5 INJECTION INTRAVENOUS at 21:59

## 2018-09-06 RX ADMIN — ATORVASTATIN CALCIUM 40 MG: 40 TABLET, FILM COATED ORAL at 22:15

## 2018-09-06 RX ADMIN — MORPHINE SULFATE 2 MG: 2 INJECTION, SOLUTION INTRAMUSCULAR; INTRAVENOUS at 15:43

## 2018-09-06 RX ADMIN — HEPARIN SODIUM 4000 UNITS: 1000 INJECTION INTRAVENOUS; SUBCUTANEOUS at 22:00

## 2018-09-06 RX ADMIN — ASPIRIN 81 MG 324 MG: 81 TABLET ORAL at 13:26

## 2018-09-06 RX ADMIN — OXYBUTYNIN CHLORIDE 15 MG: 10 TABLET, EXTENDED RELEASE ORAL at 18:11

## 2018-09-06 RX ADMIN — ACETAMINOPHEN 650 MG: 325 TABLET ORAL at 22:31

## 2018-09-06 RX ADMIN — SODIUM CHLORIDE: 9 INJECTION, SOLUTION INTRAVENOUS at 17:19

## 2018-09-06 ASSESSMENT — ENCOUNTER SYMPTOMS
COLOR CHANGE: 0
COUGH: 0
ABDOMINAL PAIN: 0
SHORTNESS OF BREATH: 0
BACK PAIN: 0

## 2018-09-06 ASSESSMENT — PAIN DESCRIPTION - LOCATION
LOCATION: CHEST
LOCATION: HEAD

## 2018-09-06 ASSESSMENT — PAIN SCALES - GENERAL
PAINLEVEL_OUTOF10: 7
PAINLEVEL_OUTOF10: 0
PAINLEVEL_OUTOF10: 0
PAINLEVEL_OUTOF10: 8
PAINLEVEL_OUTOF10: 3
PAINLEVEL_OUTOF10: 0

## 2018-09-06 ASSESSMENT — PAIN DESCRIPTION - ORIENTATION: ORIENTATION: POSTERIOR

## 2018-09-06 ASSESSMENT — PAIN DESCRIPTION - DESCRIPTORS: DESCRIPTORS: HEADACHE

## 2018-09-06 NOTE — ED PROVIDER NOTES
[facility] ED  eMERGENCY dEPARTMENT eNCOUnter      Pt Name: Ry Burrows  MRN: [id number]  [place] 1965  Date of evaluation: 9/6/2018  Provider: MARU De La Rosa CNP    CHIEF COMPLAINT       Chief Complaint   Patient presents with    Chest Pain         HISTORY OF PRESENT ILLNESS  (Location/Symptom, Timing/Onset, Context/Setting, Quality, Duration, Modifying Factors, Severity.)   Ry Burrows is a 50 y.o. female who presents to the emergency department via private auto for \"pounding\" left chest pain. Onset was while sitting about 45 minutes prior to arrival. States it is beginning to get better. Denies fever, chills, SOB, edema, injury. She has been under increased stress. She is tearful. Reports her BP was elevated at the onset; she took an extra HCTZ. Rates her pain 7/10 at this time. She had a stress test 10/2015. Nursing Notes were reviewed. ALLERGIES     Latex; Beef-derived products;  Clonidine derivatives; and Demerol hcl [meperidine]    CURRENT MEDICATIONS       Previous Medications    ACYCLOVIR (ZOVIRAX) 5 % OINTMENT    Apply topically    ATOMOXETINE (STRATTERA) 100 MG CAPSULE    Take 1 capsule by mouth daily    CHOLECALCIFEROL (VITAMIN D) 2000 UNITS TABS TABLET    Take 2,000 Units by mouth daily    EUFLEXXA 20 MG/2ML SOSY INJECTION        FLUTICASONE (FLONASE) 50 MCG/ACT NASAL SPRAY    1 spray by Nasal route daily    IMIPRAMINE (TOFRANIL-PM) 75 MG CAPSULE        LEVOTHYROXINE (SYNTHROID) 300 MCG TABLET    TAKE ONE TABLET BY MOUTH DAILY    LIDOCAINE (XYLOCAINE) 2 % JELLY        LOPERAMIDE (IMODIUM) 2 MG CAPSULE    Take 1 capsule by mouth 3 times daily     MELOXICAM (MOBIC) 15 MG TABLET    Take 1 tablet by mouth daily    METFORMIN (GLUCOPHAGE-XR) 500 MG EXTENDED RELEASE TABLET    Take 500 mg by mouth daily (with breakfast)     METOPROLOL-HYDROCHLOROTHIAZIDE (LOPRESSOR HCT) 100-25 MG PER TABLET    Take 1 tablet by mouth daily    NICODERM CQ 21 MG/24HR    Place 1

## 2018-09-06 NOTE — H&P
[facility]    HISTORY AND PHYSICAL EXAMINATION            Date:   9/6/2018  Patient name:  Venkata Flanagan  Date of admission:  9/6/2018  1:09 PM  MRN:   [id number]  Account:  [de-identified]  YOB: 1965  PCP:    Diana Mcmahan DO  Room:   7900/5669-91  Code Status:    Full Code    Chief Complaint:     Chief Complaint   Patient presents with    Chest Pain       History Obtained From:     patient, electronic medical record    History of Present Illness: The patient is a 50 y.o. Non-/non  female who presents with Chest Pain   and she is admitted to the hospital for the management of  NSTEMI     51 yo f with h/o hypothyroidism, HTN presented to ED with left sided chest pain x 1 day. Patient describes her pain as crushing pain, located on left side, a/w diaphoresis, and left arm pain. Pt states she is unsure if she felt short of breath. Patient states she started having chest pain and palpitation this morning. Patient took her blood pressure medication felt somewhat better. However chest pain came back, and decided to come to ED. Initial trop was negative in ED. Repeat trop elevated at 0.13. ekg with sinus arrhythmia, no acute ischemic changes. Patient given asa in ED. Patient started on full dose lovenox. Cardiology consulted. Patient reports having stress test 3 years ago, which was negative. Patient also reports that her father has significant cardiac history with first MI at 39 yo.        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 and had anal pullthrough at 11    Essential hypertension 9/26/2016    Insulin resistance 3/28/2017    Tobacco abuse 3/21/2018    Vitamin D deficiency 3/21/2018        Past Surgical History:     Past Surgical History:   Procedure Laterality Date    ANKLE FRACTURE SURGERY Left 2007    then hardware removed in 2011   [address] [place]    anal pullthrough    KNEE ARTHROSCOPY Left 1994, 2001, 2007    meniscal tear, lateral release, scraped arthritis    SPINE SURGERY  untethered with laminectomy 1994, 2003   [address]        Medications Prior to Admission:     Prior to Admission medications    Medication Sig Start Date End Date Taking?  Authorizing Provider   fluticasone (FLONASE) 50 MCG/ACT nasal spray 1 spray by Each Nare route daily as needed for Allergies    Historical Provider, MD   levothyroxine (SYNTHROID) 300 MCG tablet Take 300 mcg by mouth Six times weekly Does not take on Sundays    Historical Provider, MD   EUFLEXXA 20 MG/2ML SOSY injection  5/18/18   Historical Provider, MD   metoprolol-hydrochlorothiazide (LOPRESSOR HCT) 100-25 MG per tablet Take 1 tablet by mouth daily 5/2/18   Tiki Peng,    atomoxetine (STRATTERA) 100 MG capsule Take 1 capsule by mouth daily 5/2/18   Tiki Peng DO   meloxicam (MOBIC) 15 MG tablet Take 1 tablet by mouth daily 5/2/18   Tiki Peng,    oxybutynin (DITROPAN XL) 15 MG extended release tablet Take 1 tablet by mouth daily 5/2/18   Tiki Peng,    NICODERM CQ 21 MG/24HR Place 1 patch onto the skin every 24 hours 4/4/18 4/4/19  Tiki Peng DO   Cholecalciferol (VITAMIN D) 2000 units TABS tablet Take 2,000 Units by mouth daily    Historical Provider, MD   acyclovir (ZOVIRAX) 5 % ointment Apply topically every 4 hours as needed (outbreak)  8/22/17   Historical Provider, MD   lidocaine (XYLOCAINE) 2 % jelly Apply topically 3 times daily as needed for Pain  9/1/17   Historical Provider, MD   valACYclovir (VALTREX) 500 MG tablet Take 500 mg by mouth daily  3/3/17   Historical Provider, MD   imipramine (TOFRANIL-PM) 75 MG capsule Take 75 mg by mouth nightly  1/27/17   Historical Provider, MD   metFORMIN (Hipolito Amabile) 500 MG extended release tablet Take 500 mg by mouth daily (with breakfast)  3/16/17   Historical Provider, MD   traMADol (ULTRAM) 50 MG tablet   Take 50 mg by mouth every 6 hours as needed  8/14/14   Historical Provider, MD   loperamide (IMODIUM) 2 MG capsule Take 1 capsule by mouth 3 times daily as needed for Diarrhea  5/14/14   Historical Provider, MD        Allergies:     Latex; Beef-derived products; Clonidine derivatives; and Demerol hcl [meperidine]    Social History:     Tobacco:    reports that she has been smoking Cigarettes. She has never used smokeless tobacco.  Alcohol:      reports that she drinks alcohol. Drug Use:  reports that she does not use drugs. Family History:     Family History   Problem Relation Age of Onset    Other Mother         tremors     Arthritis Mother     Heart Disease Father     Diabetes Father     Cancer Father         prostate, skin and kidney     Arthritis Father     Other Sister     Other Brother         seziure     Other Sister         MS     Asthma Brother     Mental Retardation Neg Hx        Review of Systems:     Positive and Negative as described in HPI. CONSTITUTIONAL:  + sweats, negative for fevers, chills,  fatigue, weight loss  HEENT:  negative for vision, hearing changes, runny nose, throat pain  RESPIRATORY:  negative for shortness of breath, cough, congestion, wheezing. CARDIOVASCULAR:  + for chest pain, palpitations.   GASTROINTESTINAL:  negative for nausea, vomiting, diarrhea, constipation, change in bowel habits, abdominal pain   GENITOURINARY:  negative for difficulty of urination, burning with urination, frequency   INTEGUMENT:  negative for rash, skin lesions, easy bruising   HEMATOLOGIC/LYMPHATIC:  negative for swelling/edema   ALLERGIC/IMMUNOLOGIC:  negative for urticaria , itching  ENDOCRINE:  negative increase in drinking, increase in urination, hot or cold intolerance  MUSCULOSKELETAL:  negative joint pains, muscle aches, swelling of joints  NEUROLOGICAL:  negative for headaches, dizziness, lightheadedness, numbness, pain, tingling extremities  BEHAVIOR/PSYCH:  negative for depression, anxiety    Physical Exam:   /61   Pulse 62   Temp 97.5 °F (36.4 °C) (Oral)   Resp 18   Ht 5' 1\" (1.549 m)   Wt 217 lb (98.4 kg)   SpO2 93%   BMI 41.00 kg/m²   Temp (24hrs), Av.6 °F (36.4 °C), Min:97.5 °F (36.4 °C), Max:97.9 °F (36.6 °C)    No results for input(s): POCGLU in the last 72 hours. Intake/Output Summary (Last 24 hours) at 18  Last data filed at 18 1814   Gross per 24 hour   Intake            68.75 ml   Output                0 ml   Net            68.75 ml       General Appearance:  alert, well appearing, and in no acute distress  Mental status: oriented to person, place, and time with normal affect  Head:  normocephalic, atraumatic. Eye: no icterus, redness, pupils equal and reactive, extraocular eye movements intact, conjunctiva clear  Ear: normal external ear, no discharge, hearing intact  Nose:  no drainage noted  Mouth: mucous membranes moist  Neck: supple, no carotid bruits, thyroid not palpable  Lungs: Bilateral equal air entry, clear to ausculation, no wheezing, rales or rhonchi, normal effort  Cardiovascular: normal rate, regular rhythm, no murmur, gallop, rub.   Abdomen: Soft, nontender, nondistended, normal bowel sounds, no hepatomegaly or splenomegaly  Neurologic: There are no new focal motor or sensory deficits, normal muscle tone and bulk, no abnormal sensation, normal speech  Skin: No gross lesions, rashes, bruising or bleeding on exposed skin area  Extremities:  peripheral pulses palpable, no pedal edema or calf pain with palpation    Investigations:      Laboratory Testing:  Recent Results (from the past 24 hour(s))   EKG 12 Lead    Collection Time: 18  1:10 PM   Result Value Ref Range    Ventricular Rate 61 BPM    Atrial Rate 61 BPM    P-R Interval 170 ms    QRS Duration 94 ms    Q-T Interval

## 2018-09-06 NOTE — ED NOTES
Pt presents to er with c/o chest pain. Pt states she has had s/sx for approximately 20 minutes prior to arrival. Pt states she was sitting when pain started. Pt denies sob or diaphoresis at onset of pain. Pt denies n/v. Pt states she has been under a lot of stress at home recently. Pt a&ox3. Skin warm and dry. Respirations even and non-labored.       Alexsander Silva RN  09/06/18 [id number]

## 2018-09-07 ENCOUNTER — APPOINTMENT (OUTPATIENT)
Dept: CARDIAC CATH/INVASIVE PROCEDURES | Age: 53
DRG: 247 | End: 2018-09-07
Payer: COMMERCIAL

## 2018-09-07 LAB
ALBUMIN SERPL-MCNC: 3.9 G/DL (ref 3.5–5.2)
ALBUMIN/GLOBULIN RATIO: ABNORMAL (ref 1–2.5)
ALP BLD-CCNC: 63 U/L (ref 35–104)
ALT SERPL-CCNC: 25 U/L (ref 5–33)
ANION GAP SERPL CALCULATED.3IONS-SCNC: 11 MMOL/L (ref 9–17)
AST SERPL-CCNC: 23 U/L
BILIRUB SERPL-MCNC: 0.29 MG/DL (ref 0.3–1.2)
BUN BLDV-MCNC: 16 MG/DL (ref 6–20)
BUN/CREAT BLD: 20 (ref 9–20)
CALCIUM SERPL-MCNC: 8.6 MG/DL (ref 8.6–10.4)
CHLORIDE BLD-SCNC: 105 MMOL/L (ref 98–107)
CHOLESTEROL/HDL RATIO: 4.6
CHOLESTEROL: 201 MG/DL
CO2: 26 MMOL/L (ref 20–31)
CREAT SERPL-MCNC: 0.82 MG/DL (ref 0.5–0.9)
EKG ATRIAL RATE: 44 BPM
EKG ATRIAL RATE: 61 BPM
EKG P AXIS: 69 DEGREES
EKG P AXIS: 71 DEGREES
EKG P-R INTERVAL: 160 MS
EKG P-R INTERVAL: 170 MS
EKG Q-T INTERVAL: 406 MS
EKG Q-T INTERVAL: 514 MS
EKG QRS DURATION: 94 MS
EKG QRS DURATION: 96 MS
EKG QTC CALCULATION (BAZETT): 408 MS
EKG QTC CALCULATION (BAZETT): 439 MS
EKG R AXIS: 46 DEGREES
EKG R AXIS: 57 DEGREES
EKG T AXIS: 71 DEGREES
EKG T AXIS: 71 DEGREES
EKG VENTRICULAR RATE: 44 BPM
EKG VENTRICULAR RATE: 61 BPM
ESTIMATED AVERAGE GLUCOSE: 140 MG/DL
GFR AFRICAN AMERICAN: >60 ML/MIN
GFR NON-AFRICAN AMERICAN: >60 ML/MIN
GFR SERPL CREATININE-BSD FRML MDRD: ABNORMAL ML/MIN/{1.73_M2}
GFR SERPL CREATININE-BSD FRML MDRD: ABNORMAL ML/MIN/{1.73_M2}
GLUCOSE BLD-MCNC: 131 MG/DL (ref 70–99)
HBA1C MFR BLD: 6.5 % (ref 4–6)
HCT VFR BLD CALC: 40.4 % (ref 36–46)
HDLC SERPL-MCNC: 44 MG/DL
HEMOGLOBIN: 13.3 G/DL (ref 12–16)
LDL CHOLESTEROL: 121 MG/DL (ref 0–130)
LV EF: 55 %
LVEF MODALITY: NORMAL
MAGNESIUM: 2 MG/DL (ref 1.6–2.6)
MCH RBC QN AUTO: 32.9 PG (ref 26–34)
MCHC RBC AUTO-ENTMCNC: 32.8 G/DL (ref 31–37)
MCV RBC AUTO: 100.3 FL (ref 80–100)
MYOGLOBIN: 36 NG/ML (ref 25–58)
NRBC AUTOMATED: ABNORMAL PER 100 WBC
PARTIAL THROMBOPLASTIN TIME: 40 SEC (ref 23–31)
PDW BLD-RTO: 13.9 % (ref 11.5–14.5)
PLATELET # BLD: 232 K/UL (ref 130–400)
PMV BLD AUTO: 9.1 FL (ref 6–12)
POTASSIUM SERPL-SCNC: 3.6 MMOL/L (ref 3.7–5.3)
RBC # BLD: 4.03 M/UL (ref 4–5.2)
SODIUM BLD-SCNC: 142 MMOL/L (ref 135–144)
TOTAL PROTEIN: 6.3 G/DL (ref 6.4–8.3)
TRIGL SERPL-MCNC: 180 MG/DL
TROPONIN INTERP: ABNORMAL
TROPONIN T: 0.08 NG/ML
VLDLC SERPL CALC-MCNC: ABNORMAL MG/DL (ref 1–30)
WBC # BLD: 9.1 K/UL (ref 3.5–11)

## 2018-09-07 PROCEDURE — 80053 COMPREHEN METABOLIC PANEL: CPT

## 2018-09-07 PROCEDURE — 83735 ASSAY OF MAGNESIUM: CPT

## 2018-09-07 PROCEDURE — 6360000004 HC RX CONTRAST MEDICATION

## 2018-09-07 PROCEDURE — 6370000000 HC RX 637 (ALT 250 FOR IP): Performed by: FAMILY MEDICINE

## 2018-09-07 PROCEDURE — 6360000002 HC RX W HCPCS: Performed by: FAMILY MEDICINE

## 2018-09-07 PROCEDURE — 84484 ASSAY OF TROPONIN QUANT: CPT

## 2018-09-07 PROCEDURE — 85730 THROMBOPLASTIN TIME PARTIAL: CPT

## 2018-09-07 PROCEDURE — 027034Z DILATION OF CORONARY ARTERY, ONE ARTERY WITH DRUG-ELUTING INTRALUMINAL DEVICE, PERCUTANEOUS APPROACH: ICD-10-PCS | Performed by: INTERNAL MEDICINE

## 2018-09-07 PROCEDURE — 2580000003 HC RX 258

## 2018-09-07 PROCEDURE — 2500000003 HC RX 250 WO HCPCS

## 2018-09-07 PROCEDURE — 6370000000 HC RX 637 (ALT 250 FOR IP): Performed by: INTERNAL MEDICINE

## 2018-09-07 PROCEDURE — 2580000003 HC RX 258: Performed by: FAMILY MEDICINE

## 2018-09-07 PROCEDURE — 6370000000 HC RX 637 (ALT 250 FOR IP)

## 2018-09-07 PROCEDURE — B2151ZZ FLUOROSCOPY OF LEFT HEART USING LOW OSMOLAR CONTRAST: ICD-10-PCS | Performed by: INTERNAL MEDICINE

## 2018-09-07 PROCEDURE — 80061 LIPID PANEL: CPT

## 2018-09-07 PROCEDURE — 2580000003 HC RX 258: Performed by: NURSE PRACTITIONER

## 2018-09-07 PROCEDURE — 6360000002 HC RX W HCPCS

## 2018-09-07 PROCEDURE — 92928 PRQ TCAT PLMT NTRAC ST 1 LES: CPT | Performed by: INTERNAL MEDICINE

## 2018-09-07 PROCEDURE — 93306 TTE W/DOPPLER COMPLETE: CPT

## 2018-09-07 PROCEDURE — 99232 SBSQ HOSP IP/OBS MODERATE 35: CPT | Performed by: FAMILY MEDICINE

## 2018-09-07 PROCEDURE — 4A023N7 MEASUREMENT OF CARDIAC SAMPLING AND PRESSURE, LEFT HEART, PERCUTANEOUS APPROACH: ICD-10-PCS | Performed by: INTERNAL MEDICINE

## 2018-09-07 PROCEDURE — 83036 HEMOGLOBIN GLYCOSYLATED A1C: CPT

## 2018-09-07 PROCEDURE — 85027 COMPLETE CBC AUTOMATED: CPT

## 2018-09-07 PROCEDURE — 93005 ELECTROCARDIOGRAM TRACING: CPT

## 2018-09-07 PROCEDURE — 83874 ASSAY OF MYOGLOBIN: CPT

## 2018-09-07 PROCEDURE — 6360000002 HC RX W HCPCS: Performed by: INTERNAL MEDICINE

## 2018-09-07 PROCEDURE — 36415 COLL VENOUS BLD VENIPUNCTURE: CPT

## 2018-09-07 PROCEDURE — 93458 L HRT ARTERY/VENTRICLE ANGIO: CPT | Performed by: INTERNAL MEDICINE

## 2018-09-07 PROCEDURE — B2111ZZ FLUOROSCOPY OF MULTIPLE CORONARY ARTERIES USING LOW OSMOLAR CONTRAST: ICD-10-PCS | Performed by: INTERNAL MEDICINE

## 2018-09-07 PROCEDURE — 2060000000 HC ICU INTERMEDIATE R&B

## 2018-09-07 RX ORDER — SODIUM CHLORIDE 0.9 % (FLUSH) 0.9 %
10 SYRINGE (ML) INJECTION PRN
Status: CANCELLED | OUTPATIENT
Start: 2018-09-07

## 2018-09-07 RX ORDER — SODIUM CHLORIDE 9 MG/ML
INJECTION, SOLUTION INTRAVENOUS CONTINUOUS
Status: DISCONTINUED | OUTPATIENT
Start: 2018-09-07 | End: 2018-09-08 | Stop reason: HOSPADM

## 2018-09-07 RX ORDER — 0.9 % SODIUM CHLORIDE 0.9 %
250 INTRAVENOUS SOLUTION INTRAVENOUS ONCE
Status: COMPLETED | OUTPATIENT
Start: 2018-09-07 | End: 2018-09-07

## 2018-09-07 RX ORDER — ATORVASTATIN CALCIUM 80 MG/1
80 TABLET, FILM COATED ORAL NIGHTLY
Status: DISCONTINUED | OUTPATIENT
Start: 2018-09-07 | End: 2018-09-08 | Stop reason: HOSPADM

## 2018-09-07 RX ORDER — LISINOPRIL 5 MG/1
5 TABLET ORAL DAILY
Status: DISCONTINUED | OUTPATIENT
Start: 2018-09-07 | End: 2018-09-08 | Stop reason: HOSPADM

## 2018-09-07 RX ORDER — SODIUM CHLORIDE 0.9 % (FLUSH) 0.9 %
10 SYRINGE (ML) INJECTION EVERY 12 HOURS SCHEDULED
Status: CANCELLED | OUTPATIENT
Start: 2018-09-07

## 2018-09-07 RX ORDER — SODIUM CHLORIDE 0.9 % (FLUSH) 0.9 %
10 SYRINGE (ML) INJECTION PRN
Status: DISCONTINUED | OUTPATIENT
Start: 2018-09-07 | End: 2018-09-08 | Stop reason: HOSPADM

## 2018-09-07 RX ORDER — SODIUM CHLORIDE 0.9 % (FLUSH) 0.9 %
10 SYRINGE (ML) INJECTION EVERY 12 HOURS SCHEDULED
Status: DISCONTINUED | OUTPATIENT
Start: 2018-09-07 | End: 2018-09-08 | Stop reason: HOSPADM

## 2018-09-07 RX ORDER — ACETAMINOPHEN 325 MG/1
650 TABLET ORAL EVERY 4 HOURS PRN
Status: DISCONTINUED | OUTPATIENT
Start: 2018-09-07 | End: 2018-09-08 | Stop reason: HOSPADM

## 2018-09-07 RX ORDER — SODIUM CHLORIDE 9 MG/ML
INJECTION, SOLUTION INTRAVENOUS CONTINUOUS
Status: CANCELLED | OUTPATIENT
Start: 2018-09-07

## 2018-09-07 RX ADMIN — SODIUM CHLORIDE: 9 INJECTION, SOLUTION INTRAVENOUS at 03:14

## 2018-09-07 RX ADMIN — ATOMOXETINE 100 MG: 100 CAPSULE ORAL at 09:55

## 2018-09-07 RX ADMIN — METOPROLOL TARTRATE 25 MG: 25 TABLET, FILM COATED ORAL at 20:12

## 2018-09-07 RX ADMIN — MORPHINE SULFATE 2 MG: 2 INJECTION, SOLUTION INTRAMUSCULAR; INTRAVENOUS at 00:39

## 2018-09-07 RX ADMIN — ATORVASTATIN CALCIUM 80 MG: 80 TABLET, FILM COATED ORAL at 20:13

## 2018-09-07 RX ADMIN — LISINOPRIL 5 MG: 5 TABLET ORAL at 18:50

## 2018-09-07 RX ADMIN — TICAGRELOR 90 MG: 90 TABLET ORAL at 20:12

## 2018-09-07 RX ADMIN — LEVOTHYROXINE SODIUM 300 MCG: 150 TABLET ORAL at 09:57

## 2018-09-07 RX ADMIN — SODIUM CHLORIDE 250 ML: 0.9 INJECTION, SOLUTION INTRAVENOUS at 01:37

## 2018-09-07 RX ADMIN — ASPIRIN 81 MG 81 MG: 81 TABLET ORAL at 09:54

## 2018-09-07 RX ADMIN — TRAMADOL HYDROCHLORIDE 50 MG: 50 TABLET, FILM COATED ORAL at 18:50

## 2018-09-07 RX ADMIN — HEPARIN SODIUM 2000 UNITS: 1000 INJECTION INTRAVENOUS; SUBCUTANEOUS at 06:21

## 2018-09-07 ASSESSMENT — PAIN DESCRIPTION - LOCATION
LOCATION: BACK
LOCATION: BACK
LOCATION: SHOULDER;NECK

## 2018-09-07 ASSESSMENT — PAIN DESCRIPTION - PAIN TYPE
TYPE: CHRONIC PAIN
TYPE: CHRONIC PAIN

## 2018-09-07 ASSESSMENT — PAIN SCALES - GENERAL
PAINLEVEL_OUTOF10: 2
PAINLEVEL_OUTOF10: 0
PAINLEVEL_OUTOF10: 10
PAINLEVEL_OUTOF10: 0
PAINLEVEL_OUTOF10: 7

## 2018-09-07 ASSESSMENT — PAIN DESCRIPTION - DESCRIPTORS
DESCRIPTORS: CONSTANT
DESCRIPTORS: CONSTANT

## 2018-09-07 NOTE — PLAN OF CARE
Problem: Cardiac Output - Decreased:  Goal: Hemodynamic stability will improve  Hemodynamic stability will improve  Outcome: Ongoing  Pt denies CP  Thus far this shift. Plan is for cardiac cath today. Cardiology aware of assessment, VS and status. Smoking Cessation given.

## 2018-09-07 NOTE — CONSULTS
[facility]   Consult Note                 Date:   9/7/2018  Date of admission:  9/6/2018  1:09 PM  MRN:   [id number]  YOB: 1965    Reason for Consult:  NSTEMI    HISTORY OF PRESENT ILLNESS:    The patient is a 50 y.o.  female who is admitted to the hospital .Patient had palpitation followed by chest pain , retrosternal area with radiation to left arm , neck ,associated with shortness of breath , palpitation, no nausea / vomiting or diaphoresis . Yes  relation with exertion  . Had NO  H/O  previous    Angina, MI , stent, CABG. Strong Family H/O CAD, Father had First Heart attack age 40. Past Medical History:   has a past medical history of Bowel and bladder incontinence; Cloacal anomaly; Essential hypertension; Insulin resistance; Tobacco abuse; and Vitamin D deficiency. Past Surgical History:   has a past surgical history that includes Ankle fracture surgery (Left, 2007); Knee arthroscopy (Left, 1994, 2001, 2007); Anus surgery (1972); Vagina reconstruction surgery (1986); and Spine surgery (untethered with laminectomy 1994, 2003). Home Medications:    Prior to Admission medications    Medication Sig Start Date End Date Taking?  Authorizing Provider   fluticasone (FLONASE) 50 MCG/ACT nasal spray 1 spray by Each Nare route daily as needed for Allergies    Historical Provider, MD   levothyroxine (SYNTHROID) 300 MCG tablet Take 300 mcg by mouth Six times weekly Does not take on Sundays    Historical Provider, MD   EUFLEXXA 20 MG/2ML SOSY injection  5/18/18   Historical Provider, MD   metoprolol-hydrochlorothiazide (LOPRESSOR HCT) 100-25 MG per tablet Take 1 tablet by mouth daily 5/2/18   Lolly Labrador, DO   atomoxetine (STRATTERA) 100 MG capsule Take 1 capsule by mouth daily 5/2/18   Lolly Labrador, DO   meloxicam (MOBIC) 15 MG tablet Take 1 tablet by mouth daily 5/2/18   Lolly Labrador, DO   oxybutynin (DITROPAN XL) 15 MG extended release tablet Take 1 hydroxide, bisacodyl, nitroGLYCERIN, morphine, metoprolol, sodium chloride flush, ondansetron **OR** ondansetron, fluticasone, heparin (porcine), heparin (porcine)     Allergies:  Latex; Beef-derived products; Clonidine derivatives; and Demerol hcl [meperidine]    Social History:   reports that she has been smoking Cigarettes. She has never used smokeless tobacco. She reports that she drinks alcohol. She reports that she does not use drugs. Family History: family history includes Arthritis in her father and mother; Asthma in her brother; Cancer in her father; Diabetes in her father; Heart Disease in her father; Other in her brother, mother, sister, and sister. Review of Systems   CONSTITUTIONAL:  negative for fevers, chills, fatigue and malaise    EYES:  negative for discharge    HEENT:  negative for epistaxis and sore throat    RESPIRATORY:  negative for cough, shortness of breath, wheezing    CARDIOVASCULAR:  As above  chest pain, palpitations, syncope, edema    GASTROINTESTINAL:  negative for nausea, vomiting, diarrhea, constipation, abdominal pain    GENITOURINARY:  negative for incontinence    MUSCULOSKELETAL:  negative for neck or back pain    NEUROLOGICAL:  negative for headaches, seizures and double vision   PSYCHIATRIC:  negative               PHYSICAL EXAM:    Blood pressure (!) 103/42, pulse 50, temperature 98.1 °F (36.7 °C), temperature source Oral, resp. rate 18, height 5' 1\" (1.549 m), weight 217 lb (98.4 kg), SpO2 92 %.     CONSTITUTIONAL: AOx4, no apparent distress, appears stated age   HEAD: normocephalic, atraumatic   EYES: PERRLA, EOMI   ENT: moist mucous membranes, uvula midline   NECK:  symmetric, no midline tenderness to palpation   LUNGS: clear to auscultation bilaterally   CARDIOVASCULAR: regular rate and rhythm, no murmurs, rubs or gallops   ABDOMEN: Soft, non-tender, non-distended with normal active bowel sounds   SKIN: no rash       DATA:    ECG:NSR

## 2018-09-07 NOTE — PLAN OF CARE
Problem: Pain:  Goal: Patient's pain/discomfort is manageable  Patient's pain/discomfort is manageable   Outcome: Ongoing  Patient states understanding of pain scale and interventions. Pain assessed with hourly rounding and PRN. Patient states pain level is 7 out of 10. Patient states some of pain is related to anxiety about what is going on with heart. Patient given ordered medication for chest pain. EKG performed. Chest pain subsided. Will continue to monitor.

## 2018-09-07 NOTE — PROGRESS NOTES
Patient with no complaints of chest pain at this time. Patient bradycardic in 30's at times. Patient arouses easily. States HR has been low before while sleeping. Patient received 100 mg of metoprolol on 09/06/2018 at 1811. Will continue to monitor.

## 2018-09-07 NOTE — SIGNIFICANT EVENT
PLT  251   MPV  8.4     Chemistry:  Recent Labs      09/06/18   1327  09/06/18   1630  09/06/18   2207   NA  140   --    --    K  3.5*   --    --    CL  102   --    --    CO2  26   --    --    GLUCOSE  203*   --    --    BUN  19   --    --    CREATININE  0.85   --    --    ANIONGAP  12   --    --    LABGLOM  >60   --    --    GFRAA  >60   --    --    CALCIUM  9.4   --    --    PROBNP  26   --    --    TROPONINT  <0.03  0.13*  0.14*   MYOGLOBIN  67*  47  34     Recent Labs      09/06/18   1856   TSH  1.77         No results found for: SPECIAL  No results found for: CULTURE    No results found for: POCPH, PHART, PH, POCPCO2, KLW0XSF, PCO2, POCPO2, PO2ART, PO2, POCHCO3, FKA5WKJ, HCO3, NBEA, PBEA, BEART, BE, THGBART, THB, VUA7AFV, KFYO8OYM, C0LQEJKC, O2SAT, FIO2    Radiology:    SINGLE XRAY VIEW OF THE CHEST    9/6/2018 1:21 pm  FINDINGS:  The lungs are without acute focal process. There is no effusion or  pneumothorax. The cardiomediastinal silhouette is stable. The osseous  structures are stable. Impression:    No acute process.           MARU Khan CNP  9/7/2018  1:05 AM

## 2018-09-07 NOTE — PROGRESS NOTES
Patient states has weird feeling in left shoulder after turning over in bed. Vitals obtained HR and BP on low. Patient became anxious and states does not feel right. Now pain in shoulder and neck on left side. Patient anxious and tearful. Morphine given per prn order and RN spoke with NP. New orders received. EKG and bolus given. Patient resting and reports pain has subsided. Will continue to monitor.

## 2018-09-07 NOTE — PROGRESS NOTES
[facility]    Progress Note    9/7/2018    7:42 AM    Name:   Amish Mccray  MRN:     [id number]     Acct:      [de-identified]   Room:   [address] [place] [address] Day:  1  Admit Date:  9/6/2018  1:09 PM    PCP:   Jarrod Magaña DO  Code Status:  Full Code    Subjective:     C/C:   Chief Complaint   Patient presents with    Chest Pain     Interval History Status:     Patient states chest pain is better this morning. Patient reports that she is having some discomfort in left side of neck. Left arm pain is better. Trop Trending down this morning. < 0.03-> 0.13 -> 0.14 -> 0.08    Patient having bradycardia. on metoprolol 100. Patient was evaluated by cardiology this morning. Pt planned for cardiac this afternoon. Metoprolol held by cardiology     Brief History:     49 yo f with h/o HTN, hypothyroidism admitted for NSTEMI.   < 0.03-> 0.13 -> 0.14 -> 0.08. Cardiac cath on 9/7  Echo with with EF of 55%, no wall motion abnormalities, no valvular regurgitation or stenosis. Review of Systems:     Constitutional:  negative for chills, fevers, sweats  Respiratory:  negative for cough, dyspnea on exertion,  wheezing  Cardiovascular:  + chest pain, palpitation, negative for  lower extremity edema,  Gastrointestinal:  negative for abdominal pain, constipation, diarrhea, nausea, vomiting  Neurological:  negative for dizziness, headache    Medications: Allergies: Allergies   Allergen Reactions    Latex      itch    Beef-Derived Products      Patient does not eat green vegetables and no beef products. Patient does eat salads and other types of meat.     Clonidine Derivatives Other (See Comments)     Urinary issues    Demerol Hcl [Meperidine]      Palpations (heart)       Current Meds:   Scheduled Meds:    atomoxetine  100 mg Oral Daily    vitamin D  2,000 Units Oral Daily    levothyroxine  300 mcg Oral Once per day on Mon Tue Wed Thu Fri Sat    09/07/18 0742  Last data filed at 09/07/18 9029   Gross per 24 hour   Intake          1265.81 ml   Output                0 ml   Net          1265.81 ml       Labs:    Hematology:  Recent Labs      09/06/18   1327  09/07/18   0410   WBC  7.4  9.1   RBC  4.33  4.03   HGB  14.5  13.3   HCT  42.8  40.4   MCV  98.9  100.3*   MCH  33.5  32.9   MCHC  33.9  32.8   RDW  13.3  13.9   PLT  251  232   MPV  8.4  9.1     Chemistry:  Recent Labs      09/06/18   1327  09/06/18   1630  09/06/18   2207  09/07/18   0410   NA  140   --    --   142   K  3.5*   --    --   3.6*   CL  102   --    --   105   CO2  26   --    --   26   GLUCOSE  203*   --    --   131*   BUN  19   --    --   16   CREATININE  0.85   --    --   0.82   MG   --    --    --   2.0   ANIONGAP  12   --    --   11   LABGLOM  >60   --    --   >60   GFRAA  >60   --    --   >60   CALCIUM  9.4   --    --   8.6   PROBNP  26   --    --    --    TROPONINT  <0.03  0.13*  0.14*  0.08*   MYOGLOBIN  67*  47  34  36     Recent Labs      09/06/18   1856  09/07/18   0410   PROT   --   6.3*   LABALBU   --   3.9   TSH  1.77   --    AST   --   23   ALT   --   25   ALKPHOS   --   63   BILITOT   --   0.29*   CHOL   --   201*   HDL   --   44   LDLCHOLESTEROL   --   121   CHOLHDLRATIO   --   4.6   TRIG   --   180*   VLDL   --   NOT REPORTED         No results found for: SPECIAL  No results found for: CULTURE    No results found for: POCPH, PHART, PH, POCPCO2, EET7WKM, PCO2, POCPO2, PO2ART, PO2, POCHCO3, EMT0YTM, HCO3, NBEA, PBEA, BEART, BE, THGBART, THB, RPT5OOX, DKER5UOD, E2AQETFJ, O2SAT, FIO2    Radiology:    Xr Chest Portable    Result Date: 9/6/2018  EXAMINATION: SINGLE XRAY VIEW OF THE CHEST 9/6/2018 1:21 pm COMPARISON: 11/14/2017 HISTORY: ORDERING SYSTEM PROVIDED HISTORY: Chest Pain TECHNOLOGIST PROVIDED HISTORY: Chest Pain Ordering Physician Provided Reason for Exam: Port upright AP CXR - pt states chest pain, palpations and sweats Acuity: Unknown Type of Exam: Unknown FINDINGS: The lungs are without acute focal process. There is no effusion or pneumothorax. The cardiomediastinal silhouette is stable. The osseous structures are stable. No acute process. Physical Examination:        General appearance:  alert, cooperative and no distress  Mental Status:  oriented to person, place and time and normal affect  Lungs:  clear to auscultation bilaterally, normal effort  Heart:  regular rate and rhythm, no murmur  Abdomen:  soft, nontender, nondistended, normal bowel sounds, no masses, hepatomegaly, splenomegaly  Extremities:  no edema, redness, tenderness in the calves  Skin:  no gross lesions, rashes, induration    Assessment:        Primary Problem  NSTEMI (non-ST elevated myocardial infarction) [facility])    Active Hospital Problems    Diagnosis Date Noted    Chest pain [R07.9] 09/06/2018    NSTEMI (non-ST elevated myocardial infarction) ([facility] [address] [I21.4] 09/06/2018       Plan:        - patient was started on low dose heparin gtt   - started on aspirin, lipitor   - BB held due to bradycardia  - Cardiac cath today.      Leana Martines MD  9/7/2018  7:42 AM

## 2018-09-08 VITALS
TEMPERATURE: 98.8 F | BODY MASS INDEX: 40.97 KG/M2 | OXYGEN SATURATION: 99 % | HEIGHT: 61 IN | SYSTOLIC BLOOD PRESSURE: 107 MMHG | WEIGHT: 217 LBS | DIASTOLIC BLOOD PRESSURE: 66 MMHG | RESPIRATION RATE: 18 BRPM | HEART RATE: 100 BPM

## 2018-09-08 PROBLEM — E66.01 MORBID OBESITY (HCC): Status: ACTIVE | Noted: 2018-09-08

## 2018-09-08 LAB
ANION GAP SERPL CALCULATED.3IONS-SCNC: 7 MMOL/L (ref 9–17)
BUN BLDV-MCNC: 12 MG/DL (ref 6–20)
BUN/CREAT BLD: 15 (ref 9–20)
CALCIUM SERPL-MCNC: 9.2 MG/DL (ref 8.6–10.4)
CHLORIDE BLD-SCNC: 105 MMOL/L (ref 98–107)
CO2: 28 MMOL/L (ref 20–31)
CREAT SERPL-MCNC: 0.78 MG/DL (ref 0.5–0.9)
EKG ATRIAL RATE: 52 BPM
EKG P AXIS: 68 DEGREES
EKG P-R INTERVAL: 160 MS
EKG Q-T INTERVAL: 508 MS
EKG QRS DURATION: 98 MS
EKG QTC CALCULATION (BAZETT): 472 MS
EKG R AXIS: 47 DEGREES
EKG T AXIS: 119 DEGREES
EKG VENTRICULAR RATE: 52 BPM
GFR AFRICAN AMERICAN: >60 ML/MIN
GFR NON-AFRICAN AMERICAN: >60 ML/MIN
GFR SERPL CREATININE-BSD FRML MDRD: ABNORMAL ML/MIN/{1.73_M2}
GFR SERPL CREATININE-BSD FRML MDRD: ABNORMAL ML/MIN/{1.73_M2}
GLUCOSE BLD-MCNC: 115 MG/DL (ref 70–99)
HCT VFR BLD CALC: 41.9 % (ref 36.3–47.1)
HEMOGLOBIN: 13.5 G/DL (ref 11.9–15.1)
MCH RBC QN AUTO: 32.7 PG (ref 25.2–33.5)
MCHC RBC AUTO-ENTMCNC: 32.2 G/DL (ref 28.4–34.8)
MCV RBC AUTO: 101.5 FL (ref 82.6–102.9)
NRBC AUTOMATED: 0 PER 100 WBC
PDW BLD-RTO: 13.1 % (ref 11.8–14.4)
PLATELET # BLD: 238 K/UL (ref 138–453)
PMV BLD AUTO: 10.9 FL (ref 8.1–13.5)
POTASSIUM SERPL-SCNC: 4.4 MMOL/L (ref 3.7–5.3)
RBC # BLD: 4.13 M/UL (ref 3.95–5.11)
SODIUM BLD-SCNC: 140 MMOL/L (ref 135–144)
WBC # BLD: 9 K/UL (ref 3.5–11.3)

## 2018-09-08 PROCEDURE — 85027 COMPLETE CBC AUTOMATED: CPT

## 2018-09-08 PROCEDURE — 6370000000 HC RX 637 (ALT 250 FOR IP): Performed by: INTERNAL MEDICINE

## 2018-09-08 PROCEDURE — 6370000000 HC RX 637 (ALT 250 FOR IP): Performed by: FAMILY MEDICINE

## 2018-09-08 PROCEDURE — 2580000003 HC RX 258: Performed by: INTERNAL MEDICINE

## 2018-09-08 PROCEDURE — 80048 BASIC METABOLIC PNL TOTAL CA: CPT

## 2018-09-08 PROCEDURE — 93005 ELECTROCARDIOGRAM TRACING: CPT

## 2018-09-08 PROCEDURE — C1769 GUIDE WIRE: HCPCS

## 2018-09-08 PROCEDURE — C1894 INTRO/SHEATH, NON-LASER: HCPCS

## 2018-09-08 PROCEDURE — C1760 CLOSURE DEV, VASC: HCPCS

## 2018-09-08 PROCEDURE — 36415 COLL VENOUS BLD VENIPUNCTURE: CPT

## 2018-09-08 PROCEDURE — 2709999900 HC NON-CHARGEABLE SUPPLY

## 2018-09-08 PROCEDURE — C1874 STENT, COATED/COV W/DEL SYS: HCPCS

## 2018-09-08 PROCEDURE — 99239 HOSP IP/OBS DSCHRG MGMT >30: CPT | Performed by: FAMILY MEDICINE

## 2018-09-08 PROCEDURE — C1725 CATH, TRANSLUMIN NON-LASER: HCPCS

## 2018-09-08 PROCEDURE — C1887 CATHETER, GUIDING: HCPCS

## 2018-09-08 RX ORDER — LISINOPRIL 5 MG/1
5 TABLET ORAL DAILY
Qty: 30 TABLET | Refills: 3 | Status: SHIPPED | OUTPATIENT
Start: 2018-09-09

## 2018-09-08 RX ORDER — ATORVASTATIN CALCIUM 80 MG/1
80 TABLET, FILM COATED ORAL NIGHTLY
Qty: 30 TABLET | Refills: 3 | Status: SHIPPED | OUTPATIENT
Start: 2018-09-08 | End: 2021-05-17

## 2018-09-08 RX ORDER — NITROGLYCERIN 0.4 MG/1
TABLET SUBLINGUAL
Qty: 25 TABLET | Refills: 0 | Status: SHIPPED | OUTPATIENT
Start: 2018-09-08

## 2018-09-08 RX ORDER — ASPIRIN 81 MG/1
81 TABLET, CHEWABLE ORAL DAILY
Qty: 30 TABLET | Refills: 3 | Status: SHIPPED | OUTPATIENT
Start: 2018-09-09 | End: 2019-08-28 | Stop reason: SDUPTHER

## 2018-09-08 RX ADMIN — OXYBUTYNIN CHLORIDE 15 MG: 10 TABLET, EXTENDED RELEASE ORAL at 10:12

## 2018-09-08 RX ADMIN — ATOMOXETINE 100 MG: 100 CAPSULE ORAL at 10:11

## 2018-09-08 RX ADMIN — ASPIRIN 81 MG 81 MG: 81 TABLET ORAL at 10:11

## 2018-09-08 RX ADMIN — SODIUM CHLORIDE, PRESERVATIVE FREE 10 ML: 5 INJECTION INTRAVENOUS at 10:13

## 2018-09-08 RX ADMIN — TICAGRELOR 90 MG: 90 TABLET ORAL at 10:13

## 2018-09-08 RX ADMIN — VITAMIN D, TAB 1000IU (100/BT) 2000 UNITS: 25 TAB at 10:13

## 2018-09-08 RX ADMIN — METOPROLOL TARTRATE 25 MG: 25 TABLET, FILM COATED ORAL at 10:12

## 2018-09-08 RX ADMIN — ACETAMINOPHEN 650 MG: 325 TABLET ORAL at 05:30

## 2018-09-08 RX ADMIN — LISINOPRIL 5 MG: 5 TABLET ORAL at 10:12

## 2018-09-08 RX ADMIN — LEVOTHYROXINE SODIUM 300 MCG: 150 TABLET ORAL at 09:11

## 2018-09-08 ASSESSMENT — PAIN DESCRIPTION - DESCRIPTORS
DESCRIPTORS: CONSTANT

## 2018-09-08 ASSESSMENT — PAIN SCALES - GENERAL
PAINLEVEL_OUTOF10: 2
PAINLEVEL_OUTOF10: 2
PAINLEVEL_OUTOF10: 6

## 2018-09-08 ASSESSMENT — PAIN DESCRIPTION - PAIN TYPE
TYPE: CHRONIC PAIN

## 2018-09-08 ASSESSMENT — PAIN DESCRIPTION - ORIENTATION: ORIENTATION: ANTERIOR

## 2018-09-08 ASSESSMENT — PAIN DESCRIPTION - LOCATION
LOCATION: BACK
LOCATION: HEAD
LOCATION: BACK

## 2018-09-08 NOTE — PLAN OF CARE
Problem: Infection:  Goal: Will remain free from infection  Will remain free from infection   Outcome: Ongoing  No s/sx infection at this time    Problem: Safety:  Goal: Free from accidental physical injury  Free from accidental physical injury   Outcome: Ongoing  Pt remains free from accidental injury at this time  Goal: Free from intentional harm  Free from intentional harm   Outcome: Ongoing  Pt free from intentional harm at this time    Problem: Daily Care:  Goal: Daily care needs are met  Daily care needs are met   Outcome: Ongoing      Problem: Pain:  Goal: Patient's pain/discomfort is manageable  Patient's pain/discomfort is manageable   Outcome: Ongoing  Pt has chronic back/spinal pain. Pain meds as ordered. Repositioning and ambulation for comfort  Goal: Pain level will decrease  Pain level will decrease   Outcome: Ongoing    Goal: Control of acute pain  Control of acute pain   Outcome: Ongoing    Goal: Control of chronic pain  Control of chronic pain   Outcome: Ongoing      Problem: Skin Integrity:  Goal: Skin integrity will stabilize  Skin integrity will stabilize   Outcome: Ongoing  No s/sx of skin breakdown at this time    Problem: Tobacco Use:  Goal: Inpatient tobacco use cessation counseling participation  Inpatient tobacco use cessation counseling participation   Outcome: Ongoing      Problem: Cardiac Output - Decreased:  Goal: Hemodynamic stability will improve  Hemodynamic stability will improve   Outcome: Ongoing  VSS    Problem: Risk for Impaired Skin Integrity  Goal: Tissue integrity - skin and mucous membranes  Structural intactness and normal physiological function of skin and  mucous membranes.    Outcome: Ongoing  Pt skin remains intact at this time    Problem: Falls - Risk of:  Goal: Will remain free from falls  Will remain free from falls   Outcome: Ongoing  Pt remains free from falls at this time  Goal: Absence of physical injury  Absence of physical injury   Outcome: Ongoing  Pt remains

## 2018-09-08 NOTE — CARE COORDINATION
Discharge Planning    Per [facility] patient had a script for [address] called in to her [address] in Aug and it has not been picked up therefore [facility] Bril[facility].     Patient states she will just  Brilinta at [facility] and is not worried about the cost.

## 2018-09-08 NOTE — DISCHARGE SUMMARY
tablet  Commonly known as:  PRINIVIL;ZESTRIL  Take 1 tablet by mouth daily     metoprolol tartrate 25 MG tablet  Commonly known as:  LOPRESSOR  Take 1 tablet by mouth 2 times daily     nitroGLYCERIN 0.4 MG SL tablet  Commonly known as:  NITROSTAT  Place 1 tablet under tongue upon chest pain, wait 5 minutes and may repeat up to 3 doses in 15 minutes. Do not crush or break. If no relief after 1 dose, call 911. ticagrelor 90 MG Tabs tablet  Commonly known as:  BRILINTA  Take 1 tablet by mouth 2 times daily        CHANGE how you take these medications    fluticasone 50 MCG/ACT nasal spray  Commonly known as:  FLONASE  What changed:  Another medication with the same name was removed. Continue taking this medication, and follow the directions you see here. levothyroxine 300 MCG tablet  Commonly known as:  SYNTHROID  What changed:  Another medication with the same name was removed. Continue taking this medication, and follow the directions you see here.         CONTINUE taking these medications    acyclovir 5 % ointment  Commonly known as:  ZOVIRAX     atomoxetine 100 MG capsule  Commonly known as:  STRATTERA  Take 1 capsule by mouth daily     imipramine 75 MG capsule  Commonly known as:  TOFRANIL-PM     lidocaine 2 % jelly  Commonly known as:  XYLOCAINE     loperamide 2 MG capsule  Commonly known as:  IMODIUM     metFORMIN 500 MG extended release tablet  Commonly known as:  GLUCOPHAGE-XR     NICODERM CQ 21 MG/24HR  Generic drug:  nicotine  Place 1 patch onto the skin every 24 hours     oxybutynin 15 MG extended release tablet  Commonly known as:  DITROPAN XL  Take 1 tablet by mouth daily     traMADol 50 MG tablet  Commonly known as:  ULTRAM     vitamin D 2000 units Tabs tablet        STOP taking these medications    meloxicam 15 MG tablet  Commonly known as:  MOBIC     metoprolol-hydrochlorothiazide 100-25 MG per tablet  Commonly known as:  LOPRESSOR HCT     valACYclovir 500 MG tablet  Commonly known as:  VALTREX

## 2018-09-08 NOTE — PROGRESS NOTES
RN discussed discharge instructions with patient. Patient agreeable with follow up and instructions.

## 2018-09-13 PROBLEM — Z95.5 HISTORY OF HEART ARTERY STENT: Status: ACTIVE | Noted: 2018-09-13

## 2018-09-13 PROBLEM — M19.90 ARTHRITIS: Status: ACTIVE | Noted: 2018-09-13

## 2018-09-24 ENCOUNTER — HOSPITAL ENCOUNTER (OUTPATIENT)
Dept: CARDIAC REHAB | Age: 53
Setting detail: THERAPIES SERIES
Discharge: HOME | End: 2018-09-24
Payer: COMMERCIAL

## 2018-09-24 VITALS
WEIGHT: 216.5 LBS | SYSTOLIC BLOOD PRESSURE: 142 MMHG | BODY MASS INDEX: 40.87 KG/M2 | HEART RATE: 76 BPM | HEIGHT: 61 IN | DIASTOLIC BLOOD PRESSURE: 82 MMHG

## 2018-09-24 PROCEDURE — 93798 PHYS/QHP OP CAR RHAB W/ECG: CPT

## 2018-09-24 ASSESSMENT — PATIENT HEALTH QUESTIONNAIRE - PHQ9
SUM OF ALL RESPONSES TO PHQ QUESTIONS 1-9: 0
SUM OF ALL RESPONSES TO PHQ QUESTIONS 1-9: 0

## 2018-09-26 ENCOUNTER — HOSPITAL ENCOUNTER (OUTPATIENT)
Dept: CARDIAC REHAB | Age: 53
Setting detail: THERAPIES SERIES
Discharge: HOME | End: 2018-09-26
Payer: COMMERCIAL

## 2018-09-26 VITALS — BODY MASS INDEX: 40.62 KG/M2 | WEIGHT: 215 LBS

## 2018-09-26 PROCEDURE — 93798 PHYS/QHP OP CAR RHAB W/ECG: CPT

## 2018-09-28 ENCOUNTER — HOSPITAL ENCOUNTER (OUTPATIENT)
Dept: CARDIAC REHAB | Age: 53
Setting detail: THERAPIES SERIES
Discharge: HOME | End: 2018-09-28
Payer: COMMERCIAL

## 2018-09-28 VITALS — BODY MASS INDEX: 40.47 KG/M2 | WEIGHT: 214.2 LBS

## 2018-09-28 PROCEDURE — 93798 PHYS/QHP OP CAR RHAB W/ECG: CPT

## 2018-10-01 ENCOUNTER — HOSPITAL ENCOUNTER (OUTPATIENT)
Dept: CARDIAC REHAB | Age: 53
Setting detail: THERAPIES SERIES
Discharge: HOME | End: 2018-10-01
Payer: COMMERCIAL

## 2018-10-01 VITALS — WEIGHT: 212.7 LBS | BODY MASS INDEX: 40.19 KG/M2

## 2018-10-01 PROCEDURE — 93798 PHYS/QHP OP CAR RHAB W/ECG: CPT

## 2018-10-03 ENCOUNTER — HOSPITAL ENCOUNTER (OUTPATIENT)
Dept: CARDIAC REHAB | Age: 53
Setting detail: THERAPIES SERIES
Discharge: HOME | End: 2018-10-03
Payer: COMMERCIAL

## 2018-10-03 VITALS — WEIGHT: 215.1 LBS | BODY MASS INDEX: 40.64 KG/M2

## 2018-10-03 PROCEDURE — 93798 PHYS/QHP OP CAR RHAB W/ECG: CPT

## 2018-10-05 ENCOUNTER — HOSPITAL ENCOUNTER (OUTPATIENT)
Dept: CARDIAC REHAB | Age: 53
Setting detail: THERAPIES SERIES
Discharge: HOME | End: 2018-10-05
Payer: COMMERCIAL

## 2018-10-05 VITALS — WEIGHT: 215.8 LBS | BODY MASS INDEX: 40.78 KG/M2

## 2018-10-05 PROCEDURE — 93798 PHYS/QHP OP CAR RHAB W/ECG: CPT

## 2018-10-08 ENCOUNTER — HOSPITAL ENCOUNTER (OUTPATIENT)
Dept: CARDIAC REHAB | Age: 53
Setting detail: THERAPIES SERIES
Discharge: HOME | End: 2018-10-08
Payer: COMMERCIAL

## 2018-10-08 VITALS — BODY MASS INDEX: 40.57 KG/M2 | WEIGHT: 214.7 LBS

## 2018-10-08 PROCEDURE — 93798 PHYS/QHP OP CAR RHAB W/ECG: CPT

## 2018-10-10 ENCOUNTER — HOSPITAL ENCOUNTER (OUTPATIENT)
Dept: CARDIAC REHAB | Age: 53
Setting detail: THERAPIES SERIES
Discharge: HOME | End: 2018-10-10
Payer: COMMERCIAL

## 2018-10-10 VITALS — WEIGHT: 213.5 LBS | BODY MASS INDEX: 40.34 KG/M2

## 2018-10-10 PROCEDURE — 93798 PHYS/QHP OP CAR RHAB W/ECG: CPT

## 2018-10-12 ENCOUNTER — HOSPITAL ENCOUNTER (OUTPATIENT)
Dept: CARDIAC REHAB | Age: 53
Setting detail: THERAPIES SERIES
Discharge: HOME | End: 2018-10-12
Payer: COMMERCIAL

## 2018-10-12 VITALS — BODY MASS INDEX: 40.72 KG/M2 | WEIGHT: 215.5 LBS

## 2018-10-12 PROCEDURE — 93798 PHYS/QHP OP CAR RHAB W/ECG: CPT

## 2018-10-15 ENCOUNTER — HOSPITAL ENCOUNTER (OUTPATIENT)
Dept: CARDIAC REHAB | Age: 53
Setting detail: THERAPIES SERIES
Discharge: HOME | End: 2018-10-15
Payer: COMMERCIAL

## 2018-10-15 VITALS — WEIGHT: 214 LBS | BODY MASS INDEX: 40.43 KG/M2

## 2018-10-15 PROCEDURE — 93798 PHYS/QHP OP CAR RHAB W/ECG: CPT

## 2018-10-17 ENCOUNTER — HOSPITAL ENCOUNTER (OUTPATIENT)
Dept: CARDIAC REHAB | Age: 53
Setting detail: THERAPIES SERIES
Discharge: HOME | End: 2018-10-17
Payer: COMMERCIAL

## 2018-10-17 VITALS — WEIGHT: 212.1 LBS | BODY MASS INDEX: 40.08 KG/M2

## 2018-10-17 PROCEDURE — 93798 PHYS/QHP OP CAR RHAB W/ECG: CPT

## 2018-10-19 ENCOUNTER — HOSPITAL ENCOUNTER (OUTPATIENT)
Dept: CARDIAC REHAB | Age: 53
Setting detail: THERAPIES SERIES
Discharge: HOME | End: 2018-10-19
Payer: COMMERCIAL

## 2018-10-22 ENCOUNTER — HOSPITAL ENCOUNTER (OUTPATIENT)
Dept: CARDIAC REHAB | Age: 53
Setting detail: THERAPIES SERIES
Discharge: HOME | End: 2018-10-22
Payer: COMMERCIAL

## 2018-10-22 VITALS — BODY MASS INDEX: 40.23 KG/M2 | WEIGHT: 212.9 LBS

## 2018-10-22 PROCEDURE — 93798 PHYS/QHP OP CAR RHAB W/ECG: CPT

## 2018-10-24 ENCOUNTER — HOSPITAL ENCOUNTER (OUTPATIENT)
Dept: CARDIAC REHAB | Age: 53
Setting detail: THERAPIES SERIES
Discharge: HOME | End: 2018-10-24
Payer: COMMERCIAL

## 2018-10-24 VITALS — HEIGHT: 61 IN | WEIGHT: 213 LBS | BODY MASS INDEX: 40.22 KG/M2

## 2018-10-24 PROCEDURE — 93798 PHYS/QHP OP CAR RHAB W/ECG: CPT

## 2018-10-26 ENCOUNTER — HOSPITAL ENCOUNTER (OUTPATIENT)
Dept: CARDIAC REHAB | Age: 53
Setting detail: THERAPIES SERIES
Discharge: HOME | End: 2018-10-26
Payer: COMMERCIAL

## 2018-10-26 VITALS — WEIGHT: 211.5 LBS | BODY MASS INDEX: 39.96 KG/M2

## 2018-10-26 PROCEDURE — 93798 PHYS/QHP OP CAR RHAB W/ECG: CPT

## 2018-10-29 ENCOUNTER — HOSPITAL ENCOUNTER (OUTPATIENT)
Dept: CARDIAC REHAB | Age: 53
Setting detail: THERAPIES SERIES
Discharge: HOME | End: 2018-10-29
Payer: COMMERCIAL

## 2018-10-31 ENCOUNTER — HOSPITAL ENCOUNTER (OUTPATIENT)
Dept: CARDIAC REHAB | Age: 53
Setting detail: THERAPIES SERIES
Discharge: HOME | End: 2018-10-31
Payer: COMMERCIAL

## 2018-10-31 VITALS — WEIGHT: 208.7 LBS | BODY MASS INDEX: 39.43 KG/M2

## 2018-10-31 PROCEDURE — 93798 PHYS/QHP OP CAR RHAB W/ECG: CPT

## 2018-11-02 ENCOUNTER — HOSPITAL ENCOUNTER (OUTPATIENT)
Dept: CARDIAC REHAB | Age: 53
Setting detail: THERAPIES SERIES
Discharge: HOME | End: 2018-11-02
Payer: COMMERCIAL

## 2018-11-02 VITALS — BODY MASS INDEX: 39.89 KG/M2 | WEIGHT: 211.1 LBS

## 2018-11-02 PROCEDURE — 93798 PHYS/QHP OP CAR RHAB W/ECG: CPT

## 2018-11-05 ENCOUNTER — HOSPITAL ENCOUNTER (OUTPATIENT)
Dept: CARDIAC REHAB | Age: 53
Setting detail: THERAPIES SERIES
Discharge: HOME | End: 2018-11-05
Payer: COMMERCIAL

## 2018-11-05 VITALS — WEIGHT: 212.2 LBS | BODY MASS INDEX: 40.09 KG/M2

## 2018-11-05 PROCEDURE — 93798 PHYS/QHP OP CAR RHAB W/ECG: CPT

## 2018-11-07 ENCOUNTER — HOSPITAL ENCOUNTER (OUTPATIENT)
Dept: CARDIAC REHAB | Age: 53
Setting detail: THERAPIES SERIES
Discharge: HOME | End: 2018-11-07
Payer: COMMERCIAL

## 2018-11-07 NOTE — PROGRESS NOTES
Pt called rehab staff to notify us that she will not be at rehab today d/t just \"waking up\" and not going to make it.

## 2018-11-09 ENCOUNTER — HOSPITAL ENCOUNTER (OUTPATIENT)
Dept: CARDIAC REHAB | Age: 53
Setting detail: THERAPIES SERIES
Discharge: HOME | End: 2018-11-09
Payer: COMMERCIAL

## 2018-11-09 VITALS — WEIGHT: 210.2 LBS | BODY MASS INDEX: 39.72 KG/M2

## 2018-11-09 PROCEDURE — 93798 PHYS/QHP OP CAR RHAB W/ECG: CPT

## 2018-11-12 ENCOUNTER — HOSPITAL ENCOUNTER (OUTPATIENT)
Dept: CARDIAC REHAB | Age: 53
Setting detail: THERAPIES SERIES
Discharge: HOME | End: 2018-11-12
Payer: COMMERCIAL

## 2018-11-12 NOTE — PROGRESS NOTES
Pt's mother walked into cardiac rehab office today to notify us that pt is not feeling well and will not be able to exercise today. Will reschedule session.

## 2018-11-14 ENCOUNTER — HOSPITAL ENCOUNTER (OUTPATIENT)
Dept: CARDIAC REHAB | Age: 53
Setting detail: THERAPIES SERIES
Discharge: HOME | End: 2018-11-14
Payer: COMMERCIAL

## 2018-11-14 VITALS — BODY MASS INDEX: 39.49 KG/M2 | WEIGHT: 209 LBS

## 2018-11-14 PROCEDURE — 93798 PHYS/QHP OP CAR RHAB W/ECG: CPT

## 2018-11-16 ENCOUNTER — HOSPITAL ENCOUNTER (OUTPATIENT)
Dept: CARDIAC REHAB | Age: 53
Setting detail: THERAPIES SERIES
Discharge: HOME | End: 2018-11-16
Payer: COMMERCIAL

## 2018-11-16 VITALS — WEIGHT: 209.7 LBS | BODY MASS INDEX: 39.62 KG/M2

## 2018-11-16 PROCEDURE — 93798 PHYS/QHP OP CAR RHAB W/ECG: CPT

## 2018-11-19 ENCOUNTER — HOSPITAL ENCOUNTER (OUTPATIENT)
Dept: CARDIAC REHAB | Age: 53
Setting detail: THERAPIES SERIES
Discharge: HOME | End: 2018-11-19
Payer: COMMERCIAL

## 2018-11-19 VITALS — WEIGHT: 209.8 LBS | BODY MASS INDEX: 39.64 KG/M2

## 2018-11-19 PROCEDURE — 93798 PHYS/QHP OP CAR RHAB W/ECG: CPT

## 2018-11-21 ENCOUNTER — HOSPITAL ENCOUNTER (OUTPATIENT)
Dept: CARDIAC REHAB | Age: 53
Setting detail: THERAPIES SERIES
Discharge: HOME | End: 2018-11-21
Payer: COMMERCIAL

## 2018-11-21 VITALS — HEIGHT: 61 IN | BODY MASS INDEX: 39.53 KG/M2 | WEIGHT: 209.4 LBS

## 2018-11-21 PROCEDURE — 93798 PHYS/QHP OP CAR RHAB W/ECG: CPT

## 2018-11-26 ENCOUNTER — HOSPITAL ENCOUNTER (OUTPATIENT)
Dept: CARDIAC REHAB | Age: 53
Setting detail: THERAPIES SERIES
Discharge: HOME | End: 2018-11-26
Payer: COMMERCIAL

## 2018-11-26 VITALS — BODY MASS INDEX: 39.62 KG/M2 | WEIGHT: 209.7 LBS

## 2018-11-26 PROCEDURE — 93798 PHYS/QHP OP CAR RHAB W/ECG: CPT

## 2018-11-28 ENCOUNTER — HOSPITAL ENCOUNTER (OUTPATIENT)
Dept: CARDIAC REHAB | Age: 53
Setting detail: THERAPIES SERIES
Discharge: HOME | End: 2018-11-28
Payer: COMMERCIAL

## 2018-11-30 ENCOUNTER — HOSPITAL ENCOUNTER (OUTPATIENT)
Dept: CARDIAC REHAB | Age: 53
Setting detail: THERAPIES SERIES
Discharge: HOME | End: 2018-11-30
Payer: COMMERCIAL

## 2018-11-30 VITALS — BODY MASS INDEX: 39.58 KG/M2 | WEIGHT: 209.5 LBS

## 2018-12-03 ENCOUNTER — HOSPITAL ENCOUNTER (OUTPATIENT)
Dept: CARDIAC REHAB | Age: 53
Setting detail: THERAPIES SERIES
Discharge: HOME | End: 2018-12-03
Payer: COMMERCIAL

## 2018-12-03 VITALS — BODY MASS INDEX: 39.64 KG/M2 | WEIGHT: 209.8 LBS

## 2018-12-03 PROCEDURE — 93798 PHYS/QHP OP CAR RHAB W/ECG: CPT

## 2018-12-05 ENCOUNTER — HOSPITAL ENCOUNTER (OUTPATIENT)
Dept: CARDIAC REHAB | Age: 53
Setting detail: THERAPIES SERIES
Discharge: HOME | End: 2018-12-05
Payer: COMMERCIAL

## 2018-12-05 VITALS — BODY MASS INDEX: 39.74 KG/M2 | WEIGHT: 210.3 LBS

## 2018-12-05 PROCEDURE — 93798 PHYS/QHP OP CAR RHAB W/ECG: CPT

## 2018-12-07 ENCOUNTER — HOSPITAL ENCOUNTER (OUTPATIENT)
Dept: CARDIAC REHAB | Age: 53
Setting detail: THERAPIES SERIES
Discharge: HOME | End: 2018-12-07
Payer: COMMERCIAL

## 2018-12-10 ENCOUNTER — HOSPITAL ENCOUNTER (OUTPATIENT)
Dept: CARDIAC REHAB | Age: 53
Setting detail: THERAPIES SERIES
Discharge: HOME | End: 2018-12-10
Payer: COMMERCIAL

## 2018-12-10 VITALS — WEIGHT: 211.1 LBS | BODY MASS INDEX: 39.89 KG/M2

## 2018-12-10 PROCEDURE — 93798 PHYS/QHP OP CAR RHAB W/ECG: CPT

## 2018-12-12 ENCOUNTER — HOSPITAL ENCOUNTER (OUTPATIENT)
Dept: CARDIAC REHAB | Age: 53
Setting detail: THERAPIES SERIES
Discharge: HOME | End: 2018-12-12
Payer: COMMERCIAL

## 2018-12-12 VITALS — WEIGHT: 212.5 LBS | BODY MASS INDEX: 40.15 KG/M2

## 2018-12-12 PROCEDURE — 93798 PHYS/QHP OP CAR RHAB W/ECG: CPT

## 2018-12-14 ENCOUNTER — HOSPITAL ENCOUNTER (OUTPATIENT)
Dept: CARDIAC REHAB | Age: 53
Setting detail: THERAPIES SERIES
Discharge: HOME | End: 2018-12-14
Payer: COMMERCIAL

## 2018-12-14 VITALS — WEIGHT: 213.3 LBS | BODY MASS INDEX: 40.3 KG/M2

## 2018-12-14 PROCEDURE — 93798 PHYS/QHP OP CAR RHAB W/ECG: CPT

## 2018-12-17 ENCOUNTER — HOSPITAL ENCOUNTER (OUTPATIENT)
Dept: CARDIAC REHAB | Age: 53
Setting detail: THERAPIES SERIES
Discharge: HOME | End: 2018-12-17
Payer: COMMERCIAL

## 2018-12-17 VITALS — BODY MASS INDEX: 39.19 KG/M2 | WEIGHT: 207.4 LBS

## 2018-12-17 PROCEDURE — 93798 PHYS/QHP OP CAR RHAB W/ECG: CPT

## 2018-12-19 ENCOUNTER — HOSPITAL ENCOUNTER (OUTPATIENT)
Dept: CARDIAC REHAB | Age: 53
Setting detail: THERAPIES SERIES
Discharge: HOME | End: 2018-12-19
Payer: COMMERCIAL

## 2018-12-19 VITALS — BODY MASS INDEX: 39.21 KG/M2 | WEIGHT: 207.5 LBS

## 2018-12-19 PROCEDURE — 93798 PHYS/QHP OP CAR RHAB W/ECG: CPT

## 2018-12-21 ENCOUNTER — HOSPITAL ENCOUNTER (OUTPATIENT)
Dept: CARDIAC REHAB | Age: 53
Setting detail: THERAPIES SERIES
Discharge: HOME | End: 2018-12-21
Payer: COMMERCIAL

## 2018-12-21 VITALS — WEIGHT: 207.4 LBS | BODY MASS INDEX: 39.19 KG/M2

## 2018-12-21 PROCEDURE — 93798 PHYS/QHP OP CAR RHAB W/ECG: CPT

## 2019-01-02 ENCOUNTER — HOSPITAL ENCOUNTER (OUTPATIENT)
Dept: CARDIAC REHAB | Age: 54
Setting detail: THERAPIES SERIES
Discharge: HOME | End: 2019-01-02
Payer: COMMERCIAL

## 2019-01-02 VITALS — BODY MASS INDEX: 39.87 KG/M2 | WEIGHT: 211 LBS

## 2019-01-02 PROCEDURE — 93798 PHYS/QHP OP CAR RHAB W/ECG: CPT

## 2019-01-02 ASSESSMENT — PATIENT HEALTH QUESTIONNAIRE - PHQ9
SUM OF ALL RESPONSES TO PHQ QUESTIONS 1-9: 0
SUM OF ALL RESPONSES TO PHQ QUESTIONS 1-9: 0

## 2019-01-04 ENCOUNTER — HOSPITAL ENCOUNTER (OUTPATIENT)
Dept: CARDIAC REHAB | Age: 54
Setting detail: THERAPIES SERIES
Discharge: HOME | End: 2019-01-04
Payer: COMMERCIAL

## 2019-01-04 VITALS — WEIGHT: 212 LBS | BODY MASS INDEX: 40.06 KG/M2

## 2019-01-04 PROCEDURE — 93798 PHYS/QHP OP CAR RHAB W/ECG: CPT

## 2019-01-07 ENCOUNTER — HOSPITAL ENCOUNTER (OUTPATIENT)
Dept: CARDIAC REHAB | Age: 54
Setting detail: THERAPIES SERIES
Discharge: HOME | End: 2019-01-07
Payer: COMMERCIAL

## 2019-01-07 VITALS — WEIGHT: 209.9 LBS | BODY MASS INDEX: 39.66 KG/M2

## 2019-01-07 PROCEDURE — 93798 PHYS/QHP OP CAR RHAB W/ECG: CPT

## 2019-01-09 ENCOUNTER — APPOINTMENT (OUTPATIENT)
Dept: CARDIAC REHAB | Age: 54
End: 2019-01-09
Payer: COMMERCIAL

## 2019-01-11 ENCOUNTER — HOSPITAL ENCOUNTER (OUTPATIENT)
Dept: GENERAL RADIOLOGY | Facility: CLINIC | Age: 54
Discharge: HOME | End: 2019-01-13
Payer: COMMERCIAL

## 2019-01-11 ENCOUNTER — HOSPITAL ENCOUNTER (OUTPATIENT)
Dept: CARDIAC REHAB | Age: 54
Setting detail: THERAPIES SERIES
Discharge: HOME | End: 2019-01-11
Payer: COMMERCIAL

## 2019-01-11 VITALS — BODY MASS INDEX: 39.65 KG/M2 | HEIGHT: 61 IN | WEIGHT: 210 LBS

## 2019-01-11 DIAGNOSIS — M25.551 RIGHT HIP PAIN: ICD-10-CM

## 2019-01-11 PROCEDURE — 93798 PHYS/QHP OP CAR RHAB W/ECG: CPT

## 2019-01-11 PROCEDURE — 73502 X-RAY EXAM HIP UNI 2-3 VIEWS: CPT

## 2019-01-11 ASSESSMENT — PATIENT HEALTH QUESTIONNAIRE - PHQ9
SUM OF ALL RESPONSES TO PHQ QUESTIONS 1-9: 0
SUM OF ALL RESPONSES TO PHQ QUESTIONS 1-9: 0

## 2019-04-12 ENCOUNTER — HOSPITAL ENCOUNTER (EMERGENCY)
Age: 54
Discharge: HOME | End: 2019-04-12
Payer: COMMERCIAL

## 2019-04-12 ENCOUNTER — APPOINTMENT (OUTPATIENT)
Dept: GENERAL RADIOLOGY | Age: 54
End: 2019-04-12
Payer: COMMERCIAL

## 2019-04-12 VITALS
SYSTOLIC BLOOD PRESSURE: 150 MMHG | RESPIRATION RATE: 16 BRPM | WEIGHT: 198.31 LBS | DIASTOLIC BLOOD PRESSURE: 71 MMHG | OXYGEN SATURATION: 99 % | TEMPERATURE: 97.7 F | BODY MASS INDEX: 36.49 KG/M2 | HEART RATE: 75 BPM | HEIGHT: 62 IN

## 2019-04-12 DIAGNOSIS — M25.572 ACUTE LEFT ANKLE PAIN: Primary | ICD-10-CM

## 2019-04-12 PROCEDURE — 73610 X-RAY EXAM OF ANKLE: CPT

## 2019-04-12 PROCEDURE — 99283 EMERGENCY DEPT VISIT LOW MDM: CPT

## 2019-04-12 RX ORDER — HYDROCODONE BITARTRATE AND ACETAMINOPHEN 5; 325 MG/1; MG/1
1 TABLET ORAL EVERY 8 HOURS PRN
Qty: 20 TABLET | Refills: 0 | Status: SHIPPED | OUTPATIENT
Start: 2019-04-12 | End: 2019-04-19

## 2019-04-12 ASSESSMENT — PAIN SCALES - GENERAL: PAINLEVEL_OUTOF10: 5

## 2019-04-12 ASSESSMENT — ENCOUNTER SYMPTOMS: COLOR CHANGE: 0

## 2019-04-12 NOTE — LETTER
[facility] ED  [facility] [address]  Phone: [phone]             April 12, 2019    Patient: Ralf Guo   YOB: 1965   Date of Visit: 4/12/2019       To Whom It May Concern:    Lebron Hayes was seen and treated in our emergency department on 4/12/2019. Please excuse her from work 4-12-19 through 4-14-19.     Sincerely,             Signature:__________________________________

## 2019-04-12 NOTE — ED NOTES
Pt states she was using the bathroom and when she karen from the toilet she heard and felt 2 cracking/pooping sounds in her left ankle. Pt states she had a previous spiral fracture tot he same ankle and became concerned. Ambulated back to room using a walker, no bruising noted, ankle is swollen but pt states it is always swollen. Brisk capillary refill, pedal pulses present.       Phuc Padilla RN  04/12/19 6240

## 2019-04-12 NOTE — ED PROVIDER NOTES
[facility] ED  eMERGENCY dEPARTMENT eNCOUnter      Pt Name: Maribel Ledezma  MRN: [id number]  [place] 1965  Date of evaluation: 4/12/2019  Provider: MARU Damon CNP    CHIEF COMPLAINT       Chief Complaint   Patient presents with    Ankle Pain         HISTORY OF PRESENT ILLNESS  (Location/Symptom, Timing/Onset, Context/Setting, Quality, Duration, Modifying Factors, Severity.)   Maribel Ledezma is a 48 y.o. female who presents to the emergency department via private auto for left ankle pain. States she felt a pop upon standing at home today. She has N/T to her foot. The pain increases with ambulation. Rates her pain 5/10 at this time. She had a previous fracture to the area. She has a walker with her for ambulation. Nursing Notes were reviewed. ALLERGIES     Latex; Beef-derived products;  Clonidine derivatives; and Demerol hcl [meperidine]    CURRENT MEDICATIONS       Previous Medications    ACYCLOVIR (ZOVIRAX) 5 % OINTMENT    Apply topically every 4 hours as needed (outbreak)     ASPIRIN 81 MG CHEWABLE TABLET    Take 1 tablet by mouth daily    ATOMOXETINE (STRATTERA) 100 MG CAPSULE    Take 1 capsule by mouth daily    ATORVASTATIN (LIPITOR) 80 MG TABLET    Take 1 tablet by mouth nightly    CHOLECALCIFEROL (VITAMIN D) 2000 UNITS TABS TABLET    Take 2,000 Units by mouth 2 times daily     FLUTICASONE (FLONASE) 50 MCG/ACT NASAL SPRAY    1 spray by Each Nare route daily as needed for Allergies    LEVOTHYROXINE (SYNTHROID) 300 MCG TABLET    Take 300 mcg by mouth Six times weekly Does not take on Sundays    LISINOPRIL (PRINIVIL;ZESTRIL) 5 MG TABLET    Take 1 tablet by mouth daily    LOPERAMIDE (IMODIUM) 2 MG CAPSULE    Take 1 capsule by mouth 3 times daily     MELOXICAM (MOBIC) 15 MG TABLET        METFORMIN (GLUCOPHAGE-XR) 500 MG EXTENDED RELEASE TABLET    Take 500 mg by mouth daily (with breakfast)     METOPROLOL TARTRATE (LOPRESSOR) 25 MG TABLET    Take 1 tablet by mouth 2 times behavior is normal.   Vitals reviewed. DIAGNOSTIC RESULTS     RADIOLOGY:   Non-plain film images such as CT, Ultrasound and MRI are read by the radiologist. Sharon Bennett radiographic images are visualized and preliminarily interpreted by the emergency physician with the below findings:    Interpretation per the Radiologist below, if available at the time of this note:    Xr Ankle Left (min 3 Views)    Result Date: 4/12/2019  EXAMINATION: 3 XRAY VIEWS OF THE LEFT ANKLE 4/12/2019 12:58 pm COMPARISON: None. HISTORY: ORDERING SYSTEM PROVIDED HISTORY: pain TECHNOLOGIST PROVIDED HISTORY: pain Ordering Physician Provided Reason for Exam: BROKE ANKLE IN 2007 TINGLING TODAY Acuity: Unknown Type of Exam: Unknown FINDINGS: Circumferential swelling. Chronic appearing deformity of distal fibula and distal tibia. Enthesophyte at the plantar fascial origin likely due to chronic plantar fasciitis. Enthesophyte at the insertion of the Achilles. Posttraumatic changes of ankle with secondary arthritic changes of tibiotalar joint. Generalized edema/swelling. EMERGENCY DEPARTMENT COURSE and DIFFERENTIAL DIAGNOSIS/MDM:   Vitals:    Vitals:    04/12/19 1225 04/12/19 1225   BP:  (!) 150/71   Pulse:  75   Resp:  16   Temp:  97.7 °F (36.5 °C)   SpO2:  99%   Weight: 198 lb 5 oz (90 kg)    Height: 5' 1.5\" (1.562 m)        CLINICAL DECISION MAKING:  The patient presented alert with a nontoxic appearance and was seen in conjunction with Dr. Imelda Mcdaniel. Imaging was negative for acute findings. An orthotic boot was applied. The application was checked by me and was appropriate; the LLE remained NVI. OARRS was reviewed. A prescription was written for norco. The patient was advised to not drink alcohol, drive, or operate heavy machinery while taking the norco. Follow up with an orthopedist or podiatrist, return to ED if condition worsens. FINAL IMPRESSION      1.  Acute left ankle pain            Problem List  Patient Active Problem List Diagnosis Code    Cloacal anomaly Q43.7    Tethered spinal cord ([facility] [address] Q06.8    Hypothyroidism E03.9    Muscle spasm M62.838    Cold sore B00.1    ADHD (attention deficit hyperactivity disorder) F90.9    Essential hypertension I10    Insulin resistance E88.81    Vitamin D deficiency E55.9    Tobacco abuse Z72.0    Chest pain R07.9    NSTEMI (non-ST elevated myocardial infarction) (HCC) I21.4    Morbid obesity (HCC) E66.01    History of heart artery stent Z95.5    Arthritis M19.90         DISPOSITION/PLAN   DISPOSITION Decision To Discharge 04/12/2019 01:14:35 PM      PATIENT REFERRED TO:   Kwabena Cortez MD  [address] [facility]  [phone]    Schedule an appointment as soon as possible for a visit       Elberta Burkitt, [address] [facility] [address]  [address] [place] [address]  [phone]          [facility] ED  [address] [facility]  [phone]    If symptoms worsen, As needed      DISCHARGE MEDICATIONS:     New Prescriptions    HYDROCODONE-ACETAMINOPHEN (NORCO) 5-325 MG PER TABLET    Take 1 tablet by mouth every 8 hours as needed for Pain (WARNING:  May cause drowsiness.  May impair ability to operate vehicles or machinery.  Do not use in combination with alcohol.) for up to 7 days.            (Please note that portions of this note were completed with a voice recognition program.  Efforts were made to edit the dictations but occasionally words are mis-transcribed.)    MARU Villasenor CNP, APRN - CNP  04/12/19 2837

## 2019-04-12 NOTE — LETTER
[facility] ED  [address] [facility] [address]  Phone: [phone]             April 15, 2019    Patient: Andie Vuong   YOB: 1965   Date of Visit: 4/12/2019       To Whom It May Concern:    Leigh Wallace was seen and treated in our emergency department on 4/12/2019. She may return to work on 04/16/2019.       Sincerely,             Signature:__________________________________

## 2019-04-15 ENCOUNTER — OFFICE VISIT (OUTPATIENT)
Dept: PODIATRY | Age: 54
End: 2019-04-15
Payer: COMMERCIAL

## 2019-04-15 VITALS — HEIGHT: 60 IN | BODY MASS INDEX: 38.87 KG/M2 | RESPIRATION RATE: 16 BRPM | WEIGHT: 198 LBS

## 2019-04-15 DIAGNOSIS — M19.172 POST-TRAUMATIC ARTHRITIS OF LEFT ANKLE: ICD-10-CM

## 2019-04-15 DIAGNOSIS — M79.605 PAIN OF LEFT LOWER EXTREMITY: Primary | ICD-10-CM

## 2019-04-15 DIAGNOSIS — R60.0 EDEMA OF LOWER EXTREMITY: ICD-10-CM

## 2019-04-15 DIAGNOSIS — R26.2 DIFFICULTY WALKING: ICD-10-CM

## 2019-04-15 PROCEDURE — G8598 ASA/ANTIPLAT THER USED: HCPCS | Performed by: PODIATRIST

## 2019-04-15 PROCEDURE — G8427 DOCREV CUR MEDS BY ELIG CLIN: HCPCS | Performed by: PODIATRIST

## 2019-04-15 PROCEDURE — 3017F COLORECTAL CA SCREEN DOC REV: CPT | Performed by: PODIATRIST

## 2019-04-15 PROCEDURE — 1036F TOBACCO NON-USER: CPT | Performed by: PODIATRIST

## 2019-04-15 PROCEDURE — 99203 OFFICE O/P NEW LOW 30 MIN: CPT | Performed by: PODIATRIST

## 2019-04-15 PROCEDURE — G8417 CALC BMI ABV UP PARAM F/U: HCPCS | Performed by: PODIATRIST

## 2019-04-15 RX ORDER — IBUPROFEN 800 MG/1
800 TABLET ORAL 2 TIMES DAILY PRN
Qty: 60 TABLET | Refills: 0 | Status: SHIPPED | OUTPATIENT
Start: 2019-04-15 | End: 2019-04-22

## 2019-04-15 NOTE — LETTER
[facility] [place] [address] [address]  Phone: [phone]  Fax: [phone]    Ana Sol        April 15, 2019     Patient: Romeo Pascal   YOB: 1965   Date of Visit: 4/15/2019       To Whom It May Concern: It is my medical opinion that Kevin Jade may return to work on 4/16/2019, light duty, no climbing ladder's until  5/1/2019. If you have any questions or concerns, please don't hesitate to call.     Sincerely,        Jeremi Thakur DPM

## 2019-04-15 NOTE — PROGRESS NOTES
 Insulin resistance 3/28/2017    NSTEMI (non-ST elevated myocardial infarction) ([facility] [address] 09/06/2018    Tobacco abuse 3/21/2018    Vitamin D deficiency 3/21/2018       Prior to Admission medications    Medication Sig Start Date End Date Taking? Authorizing Provider   ibuprofen (ADVIL;MOTRIN) 800 MG tablet Take 1 tablet by mouth 2 times daily as needed for Pain 4/15/19  Yes Jeremi Thakur DPM   HYDROcodone-acetaminophen (NORCO) 5-325 MG per tablet Take 1 tablet by mouth every 8 hours as needed for Pain (WARNING:  May cause drowsiness.  May impair ability to operate vehicles or machinery.  Do not use in combination with alcohol.) for up to 7 days. 4/12/19 4/19/19 Yes MARU Hudson CNP   atomoxetine (STRATTERA) 100 MG capsule Take 1 capsule by mouth daily 2/19/19  Yes Shaneka Heard,    oxybutynin (DITROPAN XL) 15 MG extended release tablet Take 1 tablet by mouth daily 2/19/19  Yes Shaneka Heard DO   meloxicam (MOBIC) 15 MG tablet  10/7/18  Yes Historical Provider, MD   valACYclovir (VALTREX) 500 MG tablet Take 500 mg by mouth daily   Yes Historical Provider, MD   aspirin 81 MG chewable tablet Take 1 tablet by mouth daily 9/9/18  Yes Anahi Meier MD   nitroGLYCERIN (NITROSTAT) 0.4 MG SL tablet Place 1 tablet under tongue upon chest pain, wait 5 minutes and may repeat up to 3 doses in 15 minutes. Do not crush or break.  If no relief after 1 dose, call 911. 9/8/18  Yes Anahi Meier MD   atorvastatin (LIPITOR) 80 MG tablet Take 1 tablet by mouth nightly 9/8/18  Yes Anahi Meier MD   lisinopril (PRINIVIL;ZESTRIL) 5 MG tablet Take 1 tablet by mouth daily 9/9/18  Yes Anahi Meier MD   ticagrelor (BRILINTA) 90 MG TABS tablet Take 1 tablet by mouth 2 times daily 9/8/18  Yes Anahi Meier MD   metoprolol tartrate (LOPRESSOR) 25 MG tablet Take 1 tablet by mouth 2 times daily  Patient taking differently: Take 25 mg by mouth 2 times daily  9/8/18  Yes Anahi Meier MD   fluticasone (FLONASE) 50 MCG/ACT nasal spray 1 spray by Each Nare route daily as needed for Allergies   Yes Historical Provider, MD   levothyroxine (SYNTHROID) 300 MCG tablet Take 300 mcg by mouth Six times weekly Does not take on Sundays   Yes Historical Provider, MD   Cholecalciferol (VITAMIN D) 2000 units TABS tablet Take 2,000 Units by mouth 2 times daily    Yes Historical Provider, MD   acyclovir (ZOVIRAX) 5 % ointment Apply topically every 4 hours as needed (outbreak)  17  Yes Historical Provider, MD   metFORMIN (GLUCOPHAGE-XR) 500 MG extended release tablet Take 500 mg by mouth daily (with breakfast)  3/16/17  Yes Historical Provider, MD   traMADol (ULTRAM) 50 MG tablet   Take 50 mg by mouth every 6 hours as needed  14  Yes Historical Provider, MD   loperamide (IMODIUM) 2 MG capsule Take 1 capsule by mouth 3 times daily  14  Yes Historical Provider, MD       Past Surgical History:   Procedure Laterality Date    ANKLE FRACTURE SURGERY Left     then hardware removed in    [address] [place]    anal pullthrough    CORONARY ANGIOPLASTY WITH STENT PLACEMENT  2018    STENT X2     KNEE ARTHROSCOPY Left , ,     meniscal tear, lateral release, scraped arthritis    SPINE SURGERY  untethered with laminectomy ,    [address]       Family History   Problem Relation Age of Onset    Other Mother         tremors     Arthritis Mother     Heart Disease Father     Diabetes Father     Cancer Father         prostate, skin and kidney     Arthritis Father     Other Sister     Other Brother         seziure     Other Sister         MS     Asthma Brother     Mental Retardation Neg Hx        Social History     Tobacco Use    Smoking status: Former Smoker     Types: Cigarettes     Last attempt to quit: 2018     Years since quittin.6    Smokeless tobacco: Never Used   Substance Use Topics    Alcohol use: Yes       Review of Systems    Review of Systems:   History obtained from chart review and the patient  General ROS: negative for - chills, fatigue, fever, night sweats or weight gain  Constitutional: Negative for chills, diaphoresis, fatigue, fever and unexpected weight change. Musculoskeletal: Positive for arthralgias, gait problem and joint swelling. Neurological ROS: negative for - behavioral changes, confusion, headaches or seizures. Negative for weakness and numbness. Dermatological ROS: negative for - mole changes, rash  Cardiovascular: Negative for leg swelling. Gastrointestinal: Negative for constipation, diarrhea, nausea and vomiting. Lower Extremity Physical Examination:   Vitals:   Vitals:    04/15/19 1436   Resp: 16     General: AAO x 3 in NAD. Dermatologic Exam:  Skin lesion/ulceration Absent . Skin No rashes or nodules noted. .       Musculoskeletal:     1st MPJ ROM decreased, Bilateral.  Muscle strength 5/5, Bilateral.  Pain present upon palpation of left ankle along achilles tendon, and ATFL and CFL. Medial longitudinal arch, Bilateral WNL. Ankle ROM limited and painful, left. Dorsally contracted digits absent digits 1-5 Bilateral.     Vascular: DP and PT pulses palpable 2/4, Bilateral.  CFT <3 seconds, Bilateral.  Hair growth present to the level of the digits, Bilateral.  Edema noted to left tarik. Varicosities absent, Bilateral. Erythema absent, Bilateral    Neurological: Sensation intact to light touch to level of digits, Bilateral.  Protective sensation intact 10/10 sites via 5.07/10g [place]jay Monofilament, Bilateral.  negative Tinel's, Bilateral.  negative Valleix sign, Bilateral.      Integument: Warm, dry, supple, Bilateral.  Open lesion absent, Bilateral.  Interdigital maceration absent to web spaces 1-4, Bilateral.  Nails are normal in length, thickness and color 1-5 bilateral.  Fissures absent, Bilateral.       Asessment: Patient is a 48 y.o. female with:    Diagnosis Orders   1.  Pain of left lower extremity  MRI ANKLE LEFT

## 2019-04-23 ENCOUNTER — HOSPITAL ENCOUNTER (OUTPATIENT)
Dept: MRI IMAGING | Age: 54
Discharge: HOME | End: 2019-04-25
Payer: COMMERCIAL

## 2019-04-23 DIAGNOSIS — R60.0 EDEMA OF LOWER EXTREMITY: ICD-10-CM

## 2019-04-23 DIAGNOSIS — M79.605 PAIN OF LEFT LOWER EXTREMITY: ICD-10-CM

## 2019-04-23 DIAGNOSIS — R26.2 DIFFICULTY WALKING: ICD-10-CM

## 2019-04-23 DIAGNOSIS — M19.172 POST-TRAUMATIC ARTHRITIS OF LEFT ANKLE: ICD-10-CM

## 2019-04-24 ENCOUNTER — OFFICE VISIT (OUTPATIENT)
Dept: PODIATRY | Age: 54
End: 2019-04-24
Payer: COMMERCIAL

## 2019-04-24 VITALS
SYSTOLIC BLOOD PRESSURE: 142 MMHG | WEIGHT: 198 LBS | RESPIRATION RATE: 16 BRPM | BODY MASS INDEX: 38.87 KG/M2 | DIASTOLIC BLOOD PRESSURE: 94 MMHG | HEIGHT: 60 IN

## 2019-04-24 DIAGNOSIS — R60.0 EDEMA OF LOWER EXTREMITY: ICD-10-CM

## 2019-04-24 DIAGNOSIS — M76.62 TENDONITIS, ACHILLES, LEFT: ICD-10-CM

## 2019-04-24 DIAGNOSIS — M79.605 PAIN OF LEFT LOWER EXTREMITY: Primary | ICD-10-CM

## 2019-04-24 DIAGNOSIS — M19.172 POST-TRAUMATIC ARTHRITIS OF LEFT ANKLE: ICD-10-CM

## 2019-04-24 DIAGNOSIS — R26.2 DIFFICULTY WALKING: ICD-10-CM

## 2019-04-24 PROCEDURE — G8598 ASA/ANTIPLAT THER USED: HCPCS | Performed by: PODIATRIST

## 2019-04-24 PROCEDURE — G8427 DOCREV CUR MEDS BY ELIG CLIN: HCPCS | Performed by: PODIATRIST

## 2019-04-24 PROCEDURE — 1036F TOBACCO NON-USER: CPT | Performed by: PODIATRIST

## 2019-04-24 PROCEDURE — 99213 OFFICE O/P EST LOW 20 MIN: CPT | Performed by: PODIATRIST

## 2019-04-24 PROCEDURE — G8417 CALC BMI ABV UP PARAM F/U: HCPCS | Performed by: PODIATRIST

## 2019-04-24 PROCEDURE — 3017F COLORECTAL CA SCREEN DOC REV: CPT | Performed by: PODIATRIST

## 2019-04-24 NOTE — PROGRESS NOTES
[facility]YSICIANS  [facility] [address]  [address] [facility] [address]  Dept: [phone]  Dept Fax: [phone]    RETURN PATIENT PROGRESS NOTE  Date of patient's visit: 4/24/2019  Patient's Name:  Dina Baldwin YOB: 1965            Patient Care Team:  Thuy Mullins DO as PCP - General (Family Medicine)  Kaci Golden MD (Physical Medicine and Rehab)  Carolina Yu MD as Consulting Physician (Endocrinology)  Eva Appiah MD (Obstetrics & Gynecology)  Abundio Laguna MD (Urology)  Everardo Grubbs MD as Consulting Physician (Cardiology)  Clarisa Arenas DPM as Physician (Podiatry)       Ismael Gonzalez 48 y.o. female that presents for follow-up of left ankle pain  Chief Complaint   Patient presents with    Ankle Pain     Pt's primary care physician is Thuy Mullins DO last seen 2/19/19  Symptoms began 3 week(s) ago and are unchanged. Patient relates pain is Present to left achilles tendon. Pain is rated 8 out of 10 and is described as intermittent. Treatments prior to today's visit include: seen at Er where xrays were taken and no fracture was seen and pt advised to wear Cam boot  Pt was referred to podiatry where she was examined and MRI was recommended. Currently denies F/C/N/V. Allergies   Allergen Reactions    Latex      itch    Beef-Derived Products      Patient does not eat green vegetables and no beef products. Patient does eat salads and other types of meat.    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 Authorizing Provider   meloxicam (MOBIC) 15 MG tablet TAKE ONE TABLET BY MOUTH DAILY 4/22/19   Kathleen Camak, DO   atomoxetine (STRATTERA) 100 MG capsule Take 1 capsule by mouth daily 2/19/19   Kathleen Castanedaer, DO   oxybutynin (DITROPAN XL) 15 MG extended release tablet Take 1 tablet by mouth daily 2/19/19   Kathleen Camak, DO   valACYclovir (VALTREX) 500 MG tablet Take 500 mg by mouth daily    Historical Provider, MD   aspirin 81 MG chewable tablet Take 1 tablet by mouth daily 9/9/18   Arjun Pathak MD   nitroGLYCERIN (NITROSTAT) 0.4 MG SL tablet Place 1 tablet under tongue upon chest pain, wait 5 minutes and may repeat up to 3 doses in 15 minutes. Do not crush or break.  If no relief after 1 dose, call 911. 9/8/18   Arjun Pathak MD   atorvastatin (LIPITOR) 80 MG tablet Take 1 tablet by mouth nightly 9/8/18   Arjun Pathak MD   lisinopril (PRINIVIL;ZESTRIL) 5 MG tablet Take 1 tablet by mouth daily 9/9/18   Arjun Pathak MD   ticagrelor (BRILINTA) 90 MG TABS tablet Take 1 tablet by mouth 2 times daily 9/8/18   Arjun Pathak MD   metoprolol tartrate (LOPRESSOR) 25 MG tablet Take 1 tablet by mouth 2 times daily  Patient taking differently: Take 25 mg by mouth 2 times daily  9/8/18   Arjun Pathak MD   fluticasone (FLONASE) 50 MCG/ACT nasal spray 1 spray by Each Nare route daily as needed for Allergies    Historical Provider, MD   levothyroxine (SYNTHROID) 300 MCG tablet Take 300 mcg by mouth Six times weekly Does not take on Sundays    Historical Provider, MD   Cholecalciferol (VITAMIN D) 2000 units TABS tablet Take 2,000 Units by mouth 2 times daily     Historical Provider, MD   acyclovir (ZOVIRAX) 5 % ointment Apply topically every 4 hours as needed (outbreak)  8/22/17 Historical Provider, MD   metFORMIN (GLUCOPHAGE-XR) 500 MG extended release tablet Take 500 mg by mouth daily (with breakfast)  3/16/17   Historical Provider, MD   traMADol (ULTRAM) 50 MG tablet   Take 50 mg by mouth every 6 hours as needed  8/14/14   Historical Provider, MD   loperamide (IMODIUM) 2 MG capsule Take 1 capsule by mouth 3 times daily  5/14/14   Historical Provider, MD       Review of Systems    Review of Systems:  History obtained from chart review and the patient  General ROS: negative for - chills, fatigue, fever, night sweats or weight gain  Constitutional: Negative for chills, diaphoresis, fatigue, fever and unexpected weight change. Musculoskeletal: Positive for arthralgias, gait problem and joint swelling. Neurological ROS: negative for - behavioral changes, confusion, headaches or seizures. Negative for weakness and numbness. Dermatological ROS: negative for - mole changes, rash  Cardiovascular: Negative for leg swelling. Gastrointestinal: Negative for constipation, diarrhea, nausea and vomiting. Lower Extremity Physical Examination:     Vitals:   Vitals:    04/24/19 1119   BP: (!) 142/94   Resp:      General: AAO x 3 in NAD. Dermatologic Exam:  Skin lesion/ulceration Absent . Skin No rashes or nodules noted. .       Musculoskeletal:     1st MPJ ROM decreased, Bilateral.  Muscle strength 5/5, Bilateral.  Pain present upon palpation of left posterior ankle along achilles tendon. Medial longitudinal arch, Bilateral WNL.   Ankle ROM limited,Bilateral.    Dorsally contracted digits absent digits 1-5 Bilateral.     Vascular: DP and PT pulses palpable 2/4, Bilateral.  CFT <3 seconds, Bilateral.  Hair growth present to the level of the digits, Bilateral.  Edema absent, Bilateral.  Varicosities absent, Bilateral. Erythema absent, Bilateral    Neurological: Sensation intact to light touch to level of digits, Bilateral.  Protective sensation intact 10/10 sites via 5.07/10g [place]-Ajay Monofilament, Bilateral.  negative Tinel's, Bilateral.  negative Valleix sign, Bilateral.      Integument: Warm, dry, supple, Bilateral.  Open lesion absent, Bilateral.  Interdigital maceration absent to web spaces 1-4, Bilateral.  Nails are normal in length, thickness and color 1-5 bilateral.  Fissures absent, Bilateral.       Asessment: Patient is a 48 y.o. female with:   1. Pain of left lower extremity    2. Edema of lower extremity    3. Difficulty walking    4. Post-traumatic arthritis of left ankle    5. Tendonitis, Achilles, left        Plan: Patient examined and evaluated. Current condition and treatment options discussed in detail. Advised pt to get physical therapy. Pt may d/c CAM boot as it is affecting knee pain. Verbal and written instructions given to patient. Contact office with any questions/problems/concerns. Orders Placed This Encounter   Procedures    Ambulatory referral to Physical Therapy     Referral Priority:   Routine     Referral Type:   Eval and Treat     Requested Specialty:   Physical Therapy     Number of Visits Requested:   1     No orders of the defined types were placed in this encounter. RTC in 6week(s).     4/24/2019      Electronically signed by Jamilah Saravia DPM on 4/24/2019 at 12:19 PM  4/24/2019

## 2019-04-29 ENCOUNTER — HOSPITAL ENCOUNTER (OUTPATIENT)
Dept: PHYSICAL THERAPY | Age: 54
Setting detail: THERAPIES SERIES
Discharge: HOME | End: 2019-04-29
Payer: COMMERCIAL

## 2019-04-29 PROCEDURE — 97162 PT EVAL MOD COMPLEX 30 MIN: CPT

## 2019-04-29 NOTE — CONSULTS
[x] [facility]) [facility] &  Therapy  [address]  [phone]  F: [phone]        Physical Therapy Lower Extremity Evaluation    Date:  2019  Patient: Lalito Godoy  : 1965  MRN: [id number]  Physician: Lulu Victoria   Insurance: Medical [place]  Medical Diagnosis: L Ankle Post-traumatic Arthritis M19.172, L Achilles Tendonitis M76.62, L LE pain M79.605, LE Edema R60.0, Difficulty walking R26.2     Rehab Codes: M25.572, M 25.672, M25.472, R26.2, R26.89  Onset date: 2019  Next 's appt. : 2019    Subjective:   CC: Pt reports going from sitting to stand heard pop in ankle, couldn't put any pressure on foot, w/swelling immediately. Pt went to ER, put in boot, now seeing podiatrist.  Pt has history of 2 surgeries-ORIF spiral fracture L ankle , then removed hardware 2011 years later. Pt currently wearing CAM boot. Cont to be swollen lat calf, and pain medial ankle. Pain increases w/standing, walking up to 8/10. Pt reports can hear click when moving ankle. Pain decreases w/ cold, medication, and soaking in tub w/epsom salts. Pt tried to wear dress shoes (loafers), unable to tolerate. When swollen gets tight, skin feels tight. Pt reports wearing boot aggravates L knee, and back has had multiple surgeries on these, will go without boot and wear ace wrap due to knee problems. Pt reports wore orthotics prior to this, used them all the time. Insurance has denied MRI.   HPI: (onset date) 19    PMHx: [] Unremarkable [] Diabetes [x] HTN  [] Pacemaker   [x] MI/Heart Problems-NSTEMI, stents in heart 2018 [] Cancer [x] Arthritis   [x] Other:tethered spinal cord-untethering w/laminectomy, B shoulder pain; L knee surgery-scope , ,  Vit D deficiency, insulin resistance              [x] Refer to full medical chart  In EPIC   Tests: [x] X-Ray: [] MRI: insurance denied  [] Other:   From 19 Xray Report:  Impression   Posttraumatic changes of ankle with secondary arthritic changes of tibiotalar   joint.       Generalized edema/swelling.          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  Drawer   []   Hip Flex   3+ 4 Post. Drawer   []   Ext     Lachmans   []   ER     Valgus Stress   []   IR     Varus Stress   []   ABD     Lennys   []   ADD     Apleys Comp.   []   Knee Flex     Apleys Dist.   []   Ext   3+p 4- Hip Scouring   []   Ankle DF -- 0-6° 3+p 4 GOs   []   PF 4-27pp* 64 3+p 4+ Piriformis   []   INV 3-35 0-49° 3+p 4+ Suzis   []   EVER -- 0-9° 2p 4 Talor Tilt   []        Pat-Fem Grind   []   p=painful  *painful sup foot and achilles insertion      OBSERVATION No Deficit Deficit Not Tested Comments   Posture       Forward Head [] [x] []    Rounded Shoulders [] [x] []    Slumped Sitting [] [x] []    Palpation [] [x] [] Tender medial, ant, lat ankle   Sensation [] [] []    Edema [] [] [] Lat malleoli L 27.1 cm, R 25.9   Neurological [] [] [x]    Patellar Mobility [] [] [x]    Patellar Orientation [] [] [x]    Gait [] [x] [] Analysis:         Comments: Pt very talkative, difficulty keeping Pt on track with Other:       Game Ready    []  Medication allergies reviewed for use of    Dexamethasone Sodium Phosphate 4mg/ml     with iontophoresis treatments. Pt is not allergic. Frequency:  2 x/week for 18 visits        Todays Treatment:  Modalities:   Precautions:  Exercises:  Exercise Reps/ Time Weight/ Level Comments                                 Other:    Specific Instructions for next treatment: begin gentle ankle ex, NuStep, begin game ready to decrease pain, edema      Evaluation Complexity:  History (Personal factors, comorbidities) [] 0 [] 1-2 [x] 3+   Exam (limitations, restrictions) [] 1-2 [] 3 [x] 4+   Clinical presentation (progression) [] Stable [x] Evolving  [] Unstable   Decision Making [] Low [x] Moderate [] High    [] Low Complexity [x] Moderate Complexity [] High Complexity       Treatment Charges: Mins Units   [x] Evaluation       []  Low       [x]  Moderate       []  High 46 1   []  Modalities     []  Ther Exercise     []  Manual Therapy     []  Ther Activities     []  Aquatics     []  Vasocompression     []  Other       TOTAL TREATMENT TIME: 46 min    Time in: 6404   Time SWF:1018    Electronically signed by: Sal Odonnell PT          Physician Signature:________________________________Date:__________________  By signing above or cosigning this note, I have reviewed this plan of care and certify a need for medically necessary rehabilitation services.      *PLEASE SIGN ABOVE AND FAX BACK ALL PAGES*

## 2019-05-03 ENCOUNTER — HOSPITAL ENCOUNTER (OUTPATIENT)
Dept: PHYSICAL THERAPY | Age: 54
Setting detail: THERAPIES SERIES
Discharge: HOME | End: 2019-05-03
Payer: COMMERCIAL

## 2019-05-03 PROCEDURE — 97016 VASOPNEUMATIC DEVICE THERAPY: CPT

## 2019-05-03 PROCEDURE — 97110 THERAPEUTIC EXERCISES: CPT

## 2019-05-03 NOTE — FLOWSHEET NOTE
[x] [facility] &  Therapy  [address]  [phone]  F: [phone]        Physical Therapy Daily Treatment Note    Date:  5/3/2019  Patient Name:  Iveth Garcia    :  1965  MRN: [id number]  Physician: Moises Mullins                                Insurance: Medical [place]  Medical Diagnosis: L Ankle Post-traumatic Arthritis M19.172, L Achilles Tendonitis M76.62, L LE pain M79.605, LE Edema R60.0, Difficulty walking R26.2                  Rehab Codes: M25.572, M 25.672, M25.472, R26.2, R26.89  Onset date: 2019                       Next Dr's appt. : 2019     Visit# / total visits:   Cancels/No Shows: 0/0    Subjective:    Pain:  [x] Yes  [] No Location: L ankle Pain Rating: (0-10 scale) 4/10  Pain altered Tx:  [x] No  [] Yes  Action:  Comments: Pt arrived 23 min late for appt-called by appt time to notify us would be 15 min late. Pt reports weather is making her sore all over. Pt reports gets really swollen after work. Pt reports has been trying to do some moving her ankle at home. Objective:  Modalities: Game Ready medium pressure, 34°, in supine at end of Rx  Precautions:  Exercises: L Ankle  Exercise Reps/ Time Weight/ Level Comments   NuStep 6 m L2    Seated      Ankle AROM  20x ea  DF/PF, inv/ever, cw, ccw-5/3 Pt reports eversion is hard, ccw has click    Ankle Alphabet  1x     Ball roll 2 min  Cream 1.1 lb ball   Toe curls  20x  5/3 pulling back of ankle   LAQ 20x  Trial weight next Rx-monitor for knee pain   Supine      Quad sets  10x 3sec    Calf stretch 3x 15sec w/belt   SAQ 15x     SLR 10x  Pt c/o tightness ant hip         Sidelying      Hip abd 15x                 Other: Initiated ex per above log, including NuStep, ankle AROM ex, knee ex and hip ex. Pt w/complaints of pain or other symptoms in various areas with exercises. Pt very talkative throughout Rx, frequent cues to stay on track with ex.   Initiated game ready at

## 2019-05-07 ENCOUNTER — HOSPITAL ENCOUNTER (OUTPATIENT)
Dept: PHYSICAL THERAPY | Age: 54
Setting detail: THERAPIES SERIES
Discharge: HOME | End: 2019-05-07
Payer: COMMERCIAL

## 2019-05-07 PROCEDURE — 97016 VASOPNEUMATIC DEVICE THERAPY: CPT

## 2019-05-07 PROCEDURE — 97110 THERAPEUTIC EXERCISES: CPT

## 2019-05-07 NOTE — FLOWSHEET NOTE
[x] [facility] &  Therapy  [address]  [phone]  F: [phone]        Physical Therapy Daily Treatment Note    Date:  2019  Patient Name:  Maribel Ledezma    :  1965  MRN: [id number]  Physician: Eileen Duke                                Insurance: Medical [place]  Medical Diagnosis: L Ankle Post-traumatic Arthritis M19.172, L Achilles Tendonitis M76.62, L LE pain M79.605, LE Edema R60.0, Difficulty walking R26.2                  Rehab Codes: M25.572, M 25.672, M25.472, R26.2, R26.89  Onset date: 2019                       Next 's appt. : 2019     Visit# / total visits: 3/18  Cancels/No Shows: 0/0    Subjective:    Pain:  [x] Yes  [] No Location: L ankle Pain Rating: (0-10 scale) 310  Pain altered Tx:  [x] No  [] Yes  Action:  Comments: Pt arrived 1 hour early for appt. She has noticed if she is on her feet longer amounts ankle pain is more in the leg than in the ankle, pain is in lower calf. Pain today more in ant superior ankle/lower calf, when first happened was post medial ankle (points to these areas). Pt reports was on her feet a lot this morning, doing things as just closed on her house yesterday.       Objective:  Modalities: Game Ready medium pressure, 34°, in supine at end of Rx  Precautions:  Exercises: L Ankle  Exercise Reps/ Time Weight/ Level Comments   NuStep 6 m L2    Seated      Ankle AROM  20x ea  DF/PF, inv/ever, cw, ccw, 5/7 Pt reports ccw has clicking    Ankle Alphabet  1x     Ball roll 2 min  Cream 1.1 lb ball   Toe curls  20x     LAQ 20x 1 lb Added weight , knee clicking   Rocker Board 15x L1 Added , side-side, ant/post               Supine      Quad sets  15x 3sec    Calf stretch 3x 15sec w/belt   SAQ 20x  Progressed reps    SLR 10x2  Progressed reps          Sidelying      Hip abd 15x           Prone       HS curls  15x  Added 5/7   Hip ext 10x  Added 5/7         Other: Cont ex per above log, initiated rocker board and prone ex to streghten hip and knee and improve tolerance to walking on uneven surfaces. Pt very talkative throughout Rx, cues to stay on track with ex. Cont game ready at end of Rx. Specific Instructions for next treatment: add washcloth scrunches, issue HEP, progress ankle ex per Pt tolerance      Treatment Charges: Mins Units   []  Modalities     [x]  Ther Exercise 36 2   []  Manual Therapy     []  Ther Activities     []  Aquatics     [x]  Vasocompression 15 1   []  Other     Total Treatment time 51 min        Assessment: [x] Progressing toward goals. [] No change. [] Other:    STG: (to be met in 10 treatments)  1. ? Pain:L ankle 4/10 average, 6/10 at worst  2. ? ROM: L ankle DF 4°, PF 0-40°, Inv 0-42°, Ever 4°  3. ? Strength: L hip 4-/5, L knee ext 4-/5, L ankle 4-/5  4. ? Function:LEFS 60% loss of LE function   5. ? Edema L ankle 0.8 cm   6. Independent with Home Exercise Programs  7. Pt amb w/normal gait pattern without difficulty     LTG: (to be met in 18 treatments)  1. ? Pain:L ankle 2/10 average, 4/10 at worst  2. ? ROM: L ankle DF 8°, PF 0-50°, Inv 0-48°, Ever 8°  3. ? Strength: L hip 4/5, L knee 4/5, L ankle 4/5  4. ? Function:LEFS 48% loss of LE function  5. Pt able to ascend/descend ladders at work without difficulty             Pt. Education:  [x] Yes  [] No  [] Reviewed Prior HEP/Ed  Method of Education: [x] Verbal  [x] Demo  [] Written  Comprehension of Education:  [x] Verbalizes understanding. [] Demonstrates understanding. [] Needs review. [] Demonstrates/verbalizes HEP/Ed previously given. Plan: [x] Continue per plan of care.    [] Other:      Time In:1306           Time Out: 1399    Electronically signed by:  Glynn Lew, PT

## 2019-05-14 ENCOUNTER — HOSPITAL ENCOUNTER (OUTPATIENT)
Dept: PHYSICAL THERAPY | Age: 54
Setting detail: THERAPIES SERIES
Discharge: HOME | End: 2019-05-14
Payer: COMMERCIAL

## 2019-05-14 PROCEDURE — 97016 VASOPNEUMATIC DEVICE THERAPY: CPT

## 2019-05-14 PROCEDURE — 97110 THERAPEUTIC EXERCISES: CPT

## 2019-05-14 NOTE — FLOWSHEET NOTE
[x] [facility] &  Therapy  [address]  [phone]  F: [phone]        Physical Therapy Daily Treatment Note    Date:  2019  Patient Name:  Maribel Ledezma    :  1965  MRN: [id number]  Physician: Eileen Duke                                Insurance: Medical [place]  Medical Diagnosis: L Ankle Post-traumatic Arthritis M19.172, L Achilles Tendonitis M76.62, L LE pain M79.605, LE Edema R60.0, Difficulty walking R26.2                  Rehab Codes: M25.572, M 25.672, M25.472, R26.2, R26.89  Onset date: 2019                       Next 's appt. : 2019     Visit# / total visits:   Cancels/No Shows: 1/0    Subjective:    Pain:  [x] Yes  [] No Location: L ankle Pain Rating: (0-10 scale) 3/10  Pain altered Tx:  [x] No  [] Yes  Action:  Comments: Pt reported to Rx 10 min late, reports she had to wait at [address] parking and tried to call office. Pt reports tweaking her back last week, cont to bother her, maybe caused from wearing her brace. Pt reports she's moving this week and this weekend.       Objective:  Modalities: Game Ready medium pressure, 34°, in supine at end of Rx  Precautions:  Exercises: L Ankle  Exercise Reps/ Time Weight/ Level Comments   NuStep 6 m L2    Seated      Ankle AROM  20x ea  DF/PF, inv/ever, cw, ccw   Ankle Alphabet  1x     Ball roll 2 min  Cream 1.1 lb ball   Toe curls  20x     LAQ 20x 1 lb    Rocker Board 20x L1 side-side, ant/post, progressed reps    Washcloth Scrunches 20x  Added    Washcloth pushing-Ever 15x  Added    Washcloth pushing-Inver 15x  Added          Supine      Quad sets  15x 3sec    Calf stretch 3x 15sec w/belt   SAQ 10x2 1 lb Progressed weight    SLR 10x2 1 lb Progressed weight , instructed in abdom tenisha to protect back         Sidelying      Hip abd L 15x           Prone       HS curls  15x 1 lb Progressed weight    Hip ext --  Held  due to back pain         Other: Emphasized to Pt importance of not overdoing it while moving, she should take frequent rest breaks. Cont ex per above log, initiated washcloth scrunching and washcloth pushes. Pt again very talkative throughout Rx, cues to stay on track with ex. Educated Pt in ankle AROM ex and calf stretch for HEP, issued handout. Cont game ready at end of Rx. Specific Instructions for next treatment: progress HEP, work up to standing ex, progress ankle ex per Pt tolerance      Treatment Charges: Mins Units   []  Modalities     [x]  Ther Exercise 41 3   []  Manual Therapy     []  Ther Activities     []  Aquatics     [x]  Vasocompression 15 1   []  Other     Total Treatment time 56 min        Assessment: [x] Progressing toward goals. [] No change. [] Other:    STG: (to be met in 10 treatments)  1. ? Pain:L ankle 4/10 average, 6/10 at worst  2. ? ROM: L ankle DF 4°, PF 0-40°, Inv 0-42°, Ever 4°  3. ? Strength: L hip 4-/5, L knee ext 4-/5, L ankle 4-/5  4. ? Function:LEFS 60% loss of LE function   5. ? Edema L ankle 0.8 cm   6. Independent with Home Exercise Programs  7. Pt amb w/normal gait pattern without difficulty     LTG: (to be met in 18 treatments)  1. ? Pain:L ankle 2/10 average, 4/10 at worst  2. ? ROM: L ankle DF 8°, PF 0-50°, Inv 0-48°, Ever 8°  3. ? Strength: L hip 4/5, L knee 4/5, L ankle 4/5  4. ? Function:LEFS 48% loss of LE function  5. Pt able to ascend/descend ladders at work without difficulty             Pt. Education:  [x] Yes  [] No  [] Reviewed Prior HEP/Ed  Method of Education: [x] Verbal  [x] Demo  [x] Written  Comprehension of Education:  [x] Verbalizes understanding-ankle AROM ex, calf stretch for HEP  [] Demonstrates understanding. [] Needs review. [] Demonstrates/verbalizes HEP/Ed previously given. Plan: [x] Continue per plan of care.    [] Other:      Time In:3226           Time Out: 6920    Electronically signed by:  Lamar Caballero, PT

## 2019-05-17 ENCOUNTER — HOSPITAL ENCOUNTER (OUTPATIENT)
Dept: PHYSICAL THERAPY | Age: 54
Setting detail: THERAPIES SERIES
Discharge: HOME | End: 2019-05-17
Payer: COMMERCIAL

## 2019-05-17 PROCEDURE — 97016 VASOPNEUMATIC DEVICE THERAPY: CPT

## 2019-05-17 PROCEDURE — 97110 THERAPEUTIC EXERCISES: CPT

## 2019-05-17 NOTE — FLOWSHEET NOTE
[x] [facility] &  Therapy  [address]  [phone]  F: [phone]        Physical Therapy Daily Treatment Note    Date:  2019  Patient Name:  Rocio Dennis    :  1965  MRN: [id number]  Physician: Trevor Agrawal                                Insurance: Medical [place]  Medical Diagnosis: L Ankle Post-traumatic Arthritis M19.172, L Achilles Tendonitis M76.62, L LE pain M79.605, LE Edema R60.0, Difficulty walking R26.2                  Rehab Codes: M25.572, M 25.672, M25.472, R26.2, R26.89  Onset date: 2019                       Next 's appt. : 2019     Visit# / total visits:   Cancels/No Shows: 1/0    Subjective:    Pain:  [x] Yes  [] No Location: L ankle Pain Rating: (0-10 scale) 4/10  Pain altered Tx:  [x] No  [] Yes  Action:  Comments: Pt reports she is moving, so doing a lot. Pain depends on what she is doing, walking isn't too bad, lifting things and twisting has pain up to 8-9/10, then she knows to take a break. She has an inti-inflammatory orderd for her arthritis, but she isn't supposed to take it w/her heart medicine, questioning if there is another anit-inflammatory she can take.       Objective:  Modalities: Game Ready medium pressure, 34°, in supine at end of Rx  Precautions:  Exercises: L Ankle  Exercise Reps/ Time Weight/ Level Comments   NuStep 6 m L2    Standing       Calf stretch 3x 15sec On slant board, Added    Hip abd 10x  Added    HS curls 10x  Added   10x  Added    Seated      Ankle AROM  20x ea  DF/PF, inv/ever, cw, ccw   Ankle Alphabet  1x     Ball roll 2 min  Cream 1.1 lb ball or tennis ball   Toe curls  20x     LAQ 20x 1 lb    Rocker Board 20x L1    Washcloth Scrunches 20x     Washcloth pushing-Ever 15x  5/17 Pt reports is difficult, awkward movement    Washcloth pushing-Inver 15x           Supine      Quad sets  15x 3sec    Calf stretch 3x 15sec w/belt   SAQ 10x2 1 lb    SLR 10x2 1 lb W/abdom tenisha to protect back         Sidelying      Hip abd L 15x           Prone       HS curls  15x 1 lb    Hip ext Hold  Hold due to back pain         Other: Instructed Pt to talk to her pharmacist or Dr Garzon which medications are safe to take w/her heart medicine. Again emphasized to Pt importance of not overdoing it while moving, she should take frequent rest breaks. Cont ex per above log, initiated standing ex to improve weight bearing activities. Pt reports still painful w/AROM, but feels is moving better. Pt talkative throughout Rx, mod cues to stay on track with Rx. Cont game ready at end of Rx. Specific Instructions for next treatment: progress HEP, work up to standing ex, progress ankle ex per Pt tolerance      Treatment Charges: Mins Units   []  Modalities     [x]  Ther Exercise 46 3   []  Manual Therapy     []  Ther Activities     []  Aquatics     [x]  Vasocompression 15 1   []  Other     Total Treatment time 61 min        Assessment: [x] Progressing toward goals. [] No change. [] Other:    STG: (to be met in 10 treatments)  1. ? Pain:L ankle 4/10 average, 6/10 at worst  2. ? ROM: L ankle DF 4°, PF 0-40°, Inv 0-42°, Ever 4°  3. ? Strength: L hip 4-/5, L knee ext 4-/5, L ankle 4-/5  4. ? Function:LEFS 60% loss of LE function   5. ? Edema L ankle 0.8 cm   6. Independent with Home Exercise Programs  7. Pt amb w/normal gait pattern without difficulty     LTG: (to be met in 18 treatments)  1. ? Pain:L ankle 2/10 average, 4/10 at worst  2. ? ROM: L ankle DF 8°, PF 0-50°, Inv 0-48°, Ever 8°  3. ? Strength: L hip 4/5, L knee 4/5, L ankle 4/5  4. ? Function:LEFS 48% loss of LE function  5. Pt able to ascend/descend ladders at work without difficulty             Pt.  Education:  [x] Yes  [] No  [] Reviewed Prior HEP/Ed  Method of Education: [x] Verbal  [x] Demo  [x] Written  Comprehension of Education:  [x] Verbalizes understanding-ankle AROM ex, calf stretch for HEP  [] Demonstrates understanding. [] Needs review. [] Demonstrates/verbalizes HEP/Ed previously given. Plan: [x] Continue per plan of care.    [] Other:      Time In:1030           Time Out:  1139    Electronically signed by:  Fernanda Davies PT

## 2019-05-28 ENCOUNTER — HOSPITAL ENCOUNTER (OUTPATIENT)
Dept: PHYSICAL THERAPY | Age: 54
Setting detail: THERAPIES SERIES
Discharge: HOME | End: 2019-05-28
Payer: COMMERCIAL

## 2019-05-28 NOTE — FLOWSHEET NOTE
[] [address]  [address]    [phone]  F: [phone]   [] [address]  [facility] [address]   [address]  P: [phone]  F: [phone]  [] [place]  [address]  P: [phone]  F: [phone]   [] [address]  [facility] [address]   [place]: [phone]  F: [phone]  [] [facility]  [address] [facility] [address]  [place]: [phone]   F: [phone]     Physical Therapy Cancel/No Show note    Date: 2019  Patient: Shadia London  : 1965  MRN: [id number]    Cancels/No Shows to date: 3/0    For today's appointment patient:    [x]  Cancelled    [] Rescheduled appointment    [] No-show     Reason given by patient:    []  Patient ill    []  Conflicting appointment    [] No transportation      [] Conflict with work    [] No reason given    [] Weather related    [x] Other:      Comments:  Patient stated she had a water problem at home that she needed to deal with     [x] Next appointment was confirmed    Electronically signed by: Ivon Gagnon PTA

## 2019-05-30 ENCOUNTER — HOSPITAL ENCOUNTER (OUTPATIENT)
Dept: PHYSICAL THERAPY | Age: 54
Setting detail: THERAPIES SERIES
Discharge: HOME | End: 2019-05-30
Payer: COMMERCIAL

## 2019-05-30 NOTE — FLOWSHEET NOTE
[] [facility] &  Therapy  [address]    [phone]  F: [phone]   [] [address] [facility] [address]   [address]  P: [phone]  F: [phone]  [] [address]  [address] [facility]  P: [phone]  F: [phone]   [] [address]  [facility] [address]   [place]: [phone]  F: [phone]  [] [place] [address] [facility] [address]  [place]: [phone]   F: [phone]     Physical Therapy Cancel/No Show note    Date: 2019  Patient: Saúl Bunn  : 1965  MRN: [id number]    Cancels/No Shows to date:     For today's appointment patient:    [x]  Cancelled    [] Rescheduled appointment    [] No-show     Reason given by patient:    []  Patient ill    []  Conflicting appointment    [] No transportation      [] Conflict with work    [] No reason given    [] Weather related    [x] Other:      Comments: Job interview        [] Next appointment was confirmed    Electronically signed by: Koko Busby, PT

## 2019-06-04 NOTE — FLOWSHEET NOTE
[x] [facility] &  Therapy  [address]  [phone]  F: [phone]           Physical Therapy Discharge Note    Date: 2019      Patient: Kinga Guillermo  : 1965  MRN: [id number]    Physician: Radha Dennis                                Insurance: Medical [place]  Medical Diagnosis: L Ankle Post-traumatic Arthritis M19.172, L Achilles Tendonitis M76.62, L LE pain M79.605, LE Edema R60.0, Difficulty walking P46.0                  Rehab Codes: M25.572, M 25.672, M25.472, R26.2, R26.89  Onset date: 2019                       Next Dr's appt. : 2019     Visit# / total visits:     Total visits attended:5  Cancels/No shows: 4/0  Date of initial visit: 2019                Date of final visit: 2019      Subjective:  Refer to initial evaluation. Spoke w/Pt by phone . Pt reports change in her work schedule making it difficult to schedule appts. Pt feels her ankle getting plenty of activity between work and working on her house. Pt requests hold PT next few weeks, she will call when able to schedule. No further contact with Pt since that time, will discharge per attendance policy. Objective:  Refer to initial evaluation. Assessment:  Refer to initial evaluation. Treatment to Date:  [x] Therapeutic Exercise    [x] Modalities:  [] Therapeutic Activity    [] Ultrasound  [] Electrical Stimulation  [] Gait Training     [] Massage       [] Lumbar/Cervical Traction  [] Neuromuscular Re-education [x] Cold/hotpack [] Iontophoresis: 4 mg/mL  [x] Instruction in Home Exercise Program                     Dexamethasone Sodium  [] Manual Therapy             Phosphate 40-80 mAmin  [] Aquatic Therapy                   [x] Vasocompression/    [] Other:             Game Ready    Discharge Status:     [] Pt to continue exercise/home instructions independently.     [x] Therapy interrupted due to: Pt choice    [] Pt has 2 or more no shows/cancels, is discontinued per our policy. [] Other:         Electronically signed by Lamar Caballero PT on 6/26/2019 at 7:45 AM      If you have any questions or concerns, please don't hesitate to call.   Thank you for your referral.

## 2019-06-11 RX ORDER — IBUPROFEN 800 MG/1
TABLET ORAL
Qty: 60 TABLET | Refills: 0 | Status: SHIPPED | OUTPATIENT
Start: 2019-06-11 | End: 2019-09-05

## 2019-06-26 ENCOUNTER — OFFICE VISIT (OUTPATIENT)
Dept: PODIATRY | Age: 54
End: 2019-06-26
Payer: COMMERCIAL

## 2019-06-26 VITALS — HEIGHT: 62 IN | BODY MASS INDEX: 34.78 KG/M2 | RESPIRATION RATE: 16 BRPM | WEIGHT: 189 LBS

## 2019-06-26 DIAGNOSIS — M79.605 PAIN OF LEFT LOWER EXTREMITY: Primary | ICD-10-CM

## 2019-06-26 DIAGNOSIS — R26.2 DIFFICULTY WALKING: ICD-10-CM

## 2019-06-26 DIAGNOSIS — L60.0 INGROWN NAIL: ICD-10-CM

## 2019-06-26 DIAGNOSIS — B35.1 DERMATOPHYTOSIS OF NAIL: ICD-10-CM

## 2019-06-26 DIAGNOSIS — M19.172 POST-TRAUMATIC ARTHRITIS OF LEFT ANKLE: ICD-10-CM

## 2019-06-26 PROCEDURE — 1036F TOBACCO NON-USER: CPT | Performed by: PODIATRIST

## 2019-06-26 PROCEDURE — G8598 ASA/ANTIPLAT THER USED: HCPCS | Performed by: PODIATRIST

## 2019-06-26 PROCEDURE — G8417 CALC BMI ABV UP PARAM F/U: HCPCS | Performed by: PODIATRIST

## 2019-06-26 PROCEDURE — G8428 CUR MEDS NOT DOCUMENT: HCPCS | Performed by: PODIATRIST

## 2019-06-26 PROCEDURE — 3017F COLORECTAL CA SCREEN DOC REV: CPT | Performed by: PODIATRIST

## 2019-06-26 PROCEDURE — 99213 OFFICE O/P EST LOW 20 MIN: CPT | Performed by: PODIATRIST

## 2019-06-26 PROCEDURE — 11721 DEBRIDE NAIL 6 OR MORE: CPT | Performed by: PODIATRIST

## 2019-06-26 NOTE — PROGRESS NOTES
[facility]  [facility] [address]  [address] [facility] [address]   Dept: [phone]  Dept Fax: [phone]    RETURN PATIENT PROGRESS NOTE  Date of patient's visit: 6/26/2019  Patient's Name:  John Sanchez YOB: 1965            Patient Care Team:  Varsha Velazquez DO as PCP - General (Family Medicine)  Varsha Velazquez DO as PCP - [facility] Empaneled Provider  Mei Kessler MD (Physical Medicine and Rehab)  Jennifer Baldwin MD as Consulting Physician (Endocrinology)  Malik Briggs MD (Obstetrics & Gynecology)  Clif Santizo MD (Urology)  Adonis Meigs, MD as Consulting Physician (Cardiology)  Susie Severance, DPM as Physician (Podiatry)       Kiley Gonzalez 48 y.o. female that presents for follow-up of ankle and toe pain  Chief Complaint   Patient presents with    Ankle Injury     7 years ago    Toe Pain      2nd right     Pt's primary care physician is Varsha Velazquez DO last seen 2/19/19  Symptoms began 7 year(s) ago and are unchanged . Patient relates pain is Present. Pain is rated 7 out of 10 and is described as constant, intermittent. Treatments prior to today's visit include: surgery to ankle after original injury  7 yearsago. Most recently she had a mri of ankle  Currently denies F/C/N/V. She has new complaint of ingrown nails that she cannot reach by herself. Relates to very painful toes. She has tried OTC medication without much relief. Allergies   Allergen Reactions    Latex      itch    Beef-Derived Products      Patient does not eat green vegetables and no beef products. Patient does eat salads and other types of meat.     Clonidine Derivatives Other (See Comments)     Urinary issues    Demerol Hcl [Meperidine]      Palpations (heart)       Past Medical History:   Diagnosis Date    Arthritis 9/13/2018    Bowel and bladder incontinence     pt is on a bowel maintance program     Cloacal anomaly

## 2019-07-03 ENCOUNTER — OFFICE VISIT (OUTPATIENT)
Dept: PODIATRY | Age: 54
End: 2019-07-03

## 2019-07-03 DIAGNOSIS — M79.605 PAIN OF LEFT LOWER EXTREMITY: ICD-10-CM

## 2019-07-03 DIAGNOSIS — R26.2 DIFFICULTY WALKING: Primary | ICD-10-CM

## 2019-07-03 PROCEDURE — 99999 PR OFFICE/OUTPT VISIT,PROCEDURE ONLY: CPT | Performed by: PODIATRIST

## 2019-09-12 ENCOUNTER — APPOINTMENT (OUTPATIENT)
Dept: GENERAL RADIOLOGY | Age: 54
End: 2019-09-12
Payer: COMMERCIAL

## 2019-09-12 ENCOUNTER — HOSPITAL ENCOUNTER (EMERGENCY)
Age: 54
Discharge: HOME | End: 2019-09-12
Attending: EMERGENCY MEDICINE
Payer: COMMERCIAL

## 2019-09-12 VITALS
OXYGEN SATURATION: 95 % | SYSTOLIC BLOOD PRESSURE: 114 MMHG | HEART RATE: 64 BPM | DIASTOLIC BLOOD PRESSURE: 77 MMHG | RESPIRATION RATE: 19 BRPM | TEMPERATURE: 98.3 F | HEIGHT: 61 IN | BODY MASS INDEX: 35.3 KG/M2 | WEIGHT: 187 LBS

## 2019-09-12 DIAGNOSIS — R79.89 LOW TSH LEVEL: ICD-10-CM

## 2019-09-12 DIAGNOSIS — R07.9 CHEST PAIN, UNSPECIFIED TYPE: Primary | ICD-10-CM

## 2019-09-12 LAB
ABSOLUTE EOS #: 0.07 K/UL (ref 0–0.44)
ABSOLUTE IMMATURE GRANULOCYTE: 0.25 K/UL (ref 0–0.3)
ABSOLUTE LYMPH #: 1.82 K/UL (ref 1.1–3.7)
ABSOLUTE MONO #: 0.54 K/UL (ref 0.1–1.2)
ANION GAP SERPL CALCULATED.3IONS-SCNC: 8 MMOL/L (ref 9–17)
BASOPHILS # BLD: 0 % (ref 0–2)
BASOPHILS ABSOLUTE: 0.04 K/UL (ref 0–0.2)
BUN BLDV-MCNC: 22 MG/DL (ref 6–20)
BUN/CREAT BLD: 27 (ref 9–20)
CALCIUM SERPL-MCNC: 9.4 MG/DL (ref 8.6–10.4)
CHLORIDE BLD-SCNC: 102 MMOL/L (ref 98–107)
CO2: 25 MMOL/L (ref 20–31)
CREAT SERPL-MCNC: 0.81 MG/DL (ref 0.5–0.9)
DIFFERENTIAL TYPE: ABNORMAL
EOSINOPHILS RELATIVE PERCENT: 1 % (ref 1–4)
GFR AFRICAN AMERICAN: >60 ML/MIN
GFR NON-AFRICAN AMERICAN: >60 ML/MIN
GFR SERPL CREATININE-BSD FRML MDRD: ABNORMAL ML/MIN/{1.73_M2}
GFR SERPL CREATININE-BSD FRML MDRD: ABNORMAL ML/MIN/{1.73_M2}
GLUCOSE BLD-MCNC: 154 MG/DL (ref 70–99)
HCT VFR BLD CALC: 44 % (ref 36.3–47.1)
HEMOGLOBIN: 14.7 G/DL (ref 11.9–15.1)
IMMATURE GRANULOCYTES: 2 %
LYMPHOCYTES # BLD: 14 % (ref 24–43)
MCH RBC QN AUTO: 32.2 PG (ref 25.2–33.5)
MCHC RBC AUTO-ENTMCNC: 33.4 G/DL (ref 28.4–34.8)
MCV RBC AUTO: 96.5 FL (ref 82.6–102.9)
MONOCYTES # BLD: 4 % (ref 3–12)
NRBC AUTOMATED: 0 PER 100 WBC
PDW BLD-RTO: 14.8 % (ref 11.8–14.4)
PLATELET # BLD: 267 K/UL (ref 138–453)
PLATELET ESTIMATE: ABNORMAL
PMV BLD AUTO: 9.9 FL (ref 8.1–13.5)
POTASSIUM SERPL-SCNC: 4 MMOL/L (ref 3.7–5.3)
RBC # BLD: 4.56 M/UL (ref 3.95–5.11)
RBC # BLD: ABNORMAL 10*6/UL
SEG NEUTROPHILS: 79 % (ref 36–65)
SEGMENTED NEUTROPHILS ABSOLUTE COUNT: 10.51 K/UL (ref 1.5–8.1)
SODIUM BLD-SCNC: 135 MMOL/L (ref 135–144)
THYROXINE, FREE: 2.23 NG/DL (ref 0.93–1.7)
TROPONIN INTERP: NORMAL
TROPONIN INTERP: NORMAL
TROPONIN T: NORMAL NG/ML
TROPONIN T: NORMAL NG/ML
TROPONIN, HIGH SENSITIVITY: <6 NG/L (ref 0–14)
TROPONIN, HIGH SENSITIVITY: <6 NG/L (ref 0–14)
TSH SERPL DL<=0.05 MIU/L-ACNC: 0.18 MIU/L (ref 0.3–5)
WBC # BLD: 13.2 K/UL (ref 3.5–11.3)
WBC # BLD: ABNORMAL 10*3/UL

## 2019-09-12 PROCEDURE — 71046 X-RAY EXAM CHEST 2 VIEWS: CPT

## 2019-09-12 PROCEDURE — 84484 ASSAY OF TROPONIN QUANT: CPT

## 2019-09-12 PROCEDURE — 73100 X-RAY EXAM OF WRIST: CPT

## 2019-09-12 PROCEDURE — 80048 BASIC METABOLIC PNL TOTAL CA: CPT

## 2019-09-12 PROCEDURE — 84443 ASSAY THYROID STIM HORMONE: CPT

## 2019-09-12 PROCEDURE — 85025 COMPLETE CBC W/AUTO DIFF WBC: CPT

## 2019-09-12 PROCEDURE — 93005 ELECTROCARDIOGRAM TRACING: CPT

## 2019-09-12 PROCEDURE — 84439 ASSAY OF FREE THYROXINE: CPT

## 2019-09-12 PROCEDURE — 99285 EMERGENCY DEPT VISIT HI MDM: CPT

## 2019-09-12 RX ORDER — IMIPRAMINE PAMOATE 75 MG
75 CAPSULE ORAL NIGHTLY
COMMUNITY
End: 2021-05-17

## 2019-09-12 NOTE — ED PROVIDER NOTES
EMERGENCY DEPARTMENT ENCOUNTER    Pt Name: Airam Grayson  MRN: [id number]  [place] 1965  Date of evaluation: 9/12/19  CHIEF COMPLAINT       Chief Complaint   Patient presents with    Chest Pain    Excessive Sweating     HISTORY OF PRESENT ILLNESS   72-year-old female presents emergency department because she is \"feeling off\" she tells me she did have an episode of chest pain on Sunday night. She has not had any chest pain since then. She states that something just feels abnormal she feels funny. Her doctor did recently start her on steroids for exposure to poison ivy. She was also recently changed from her Synthroid to a lower dose. She does not have any nausea or chest pain. She denies any difficulty breathing. She states earlier today she felt very sweaty. Patient did have an STEMI and stent placed in the LAD approximately 1 year ago. She follows up with Rob Rosenbaum with cardiology. She has not had any issues since her stent was placed. She was concerned that this may be related to her cardiac disease. REVIEW OF SYSTEMS     Review of Systems   All other systems reviewed and are negative.     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12    Essential hypertension 9/26/2016    History of heart artery stent 9/13/2018    Insulin resistance 3/28/2017    NSTEMI (non-ST elevated myocardial infarction) ([facility] [address] 09/06/2018    Tobacco abuse 3/21/2018    Vitamin D deficiency 3/21/2018     SURGICAL HISTORY       Past Surgical History:   Procedure Laterality Date    ANKLE FRACTURE SURGERY Left 2007    then hardware removed in 2011   [address] [place]    anal pullthrough    CORONARY ANGIOPLASTY WITH STENT PLACEMENT  09/06/2018    STENT X2     KNEE ARTHROSCOPY Left 1994, 2001, 2007 meniscal tear, lateral release, scraped arthritis    SPINE SURGERY  untethered with laminectomy 1994, 2003   [address] [facility]       Discharge Medication List as of 9/12/2019  8:52 PM      CONTINUE these medications which have NOT CHANGED    Details   metoprolol tartrate (LOPRESSOR) 25 MG tablet Take 12.5 mg by mouth 2 times daily Takes 1/2 tab (=12.5mg) BIDHistorical Med      imipramine (TOFRANIL-PM) 75 MG capsule Take 75 mg by mouth nightlyHistorical Med      predniSONE (DELTASONE) 20 MG tablet 1 po tid x 3 days, then 1 bid x 3 days, then 1 qd x 3 days. , Disp-18 tablet, R-0Normal      fexofenadine (ALLEGRA) 180 MG tablet Take 1 tablet by mouth daily as needed (itching), Disp-30 tablet, R-0Normal      levothyroxine (SYNTHROID) 200 MCG tablet Take 200 mcg by mouth every morning (before breakfast) Historical Med      atomoxetine (STRATTERA) 80 MG capsule Take 1 capsule by mouth daily, Disp-90 capsule, R-0Normal      aspirin 81 MG chewable tablet Take 1 tablet by mouth daily, Disp-90 tablet, R-3Normal      Elastic Bandages & Supports (WRIST SPLINT/COCK-UP/LEFT M) MISC Disp-1 each, R-0, PrintWear on left wrist nightly for carpal tunnel. meloxicam (MOBIC) 15 MG tablet TAKE ONE TABLET BY MOUTH DAILY, Disp-90 tablet, R-1Normal      oxybutynin (DITROPAN XL) 15 MG extended release tablet Take 1 tablet by mouth daily, Disp-90 tablet, R-1Normal      valACYclovir (VALTREX) 500 MG tablet Take 500 mg by mouth every evening Historical Med      nitroGLYCERIN (NITROSTAT) 0.4 MG SL tablet Place 1 tablet under tongue upon chest pain, wait 5 minutes and may repeat up to 3 doses in 15 minutes. Do not crush or break.  If no relief after 1 dose, call 911., Disp-25 tablet, R-0Print      atorvastatin (LIPITOR) 80 MG tablet Take 1 tablet by mouth nightly, Disp-30 tablet, R-3Normal      lisinopril (PRINIVIL;ZESTRIL) 5 MG tablet Take 1 tablet by mouth daily, Disp-30 tablet, R-3Normal ticagrelor (BRILINTA) 90 MG TABS tablet Take 1 tablet by mouth 2 times daily, Disp-60 tablet, R-2Normal      Cholecalciferol (VITAMIN D) 2000 units TABS tablet Take 2,000 Units by mouth 2 times daily Historical Med      metFORMIN (GLUCOPHAGE-XR) 500 MG extended release tablet Take 500 mg by mouth daily (with breakfast) Historical Med      loperamide (IMODIUM) 2 MG capsule Take 1 capsule by mouth 2 times daily for bowel management, not diarrheaHistorical Med           ALLERGIES     is allergic to latex; beef-derived products; clonidine derivatives; and demerol hcl [meperidine]. FAMILY HISTORY     She indicated that her mother is alive. She indicated that her father is alive. She indicated that both of her sisters are alive. She indicated that two of her three brothers are alive. She indicated that the status of her neg hx is unknown. SOCIAL HISTORY       Social History     Tobacco Use    Smoking status: Current Every Day Smoker     Types: Cigarettes    Smokeless tobacco: Never Used   Substance Use Topics    Alcohol use: Yes    Drug use: No     PHYSICAL EXAM     INITIAL VITALS: /77   Pulse 64   Temp 98.3 °F (36.8 °C) (Oral)   Resp 19   Ht 5' 1\" (1.549 m)   Wt 187 lb (84.8 kg)   SpO2 95%   BMI 35.33 kg/m²    Physical Exam   Constitutional: She is oriented to person, place, and time. She appears well-developed and well-nourished. No distress. HENT:   Head: Normocephalic. Eyes: Pupils are equal, round, and reactive to light. Cardiovascular: Normal rate, regular rhythm and normal heart sounds. Pulmonary/Chest: Effort normal and breath sounds normal. No respiratory distress. Abdominal: Soft. Bowel sounds are normal. There is no tenderness. Musculoskeletal: Normal range of motion. Neurological: She is alert and oriented to person, place, and time. Skin: Skin is warm and dry. Psychiatric: She has a normal mood and affect.        MEDICAL DECISION MAKIN-year-old female

## 2019-09-13 LAB
EKG ATRIAL RATE: 71 BPM
EKG P AXIS: 70 DEGREES
EKG P-R INTERVAL: 142 MS
EKG Q-T INTERVAL: 354 MS
EKG QRS DURATION: 96 MS
EKG QTC CALCULATION (BAZETT): 384 MS
EKG R AXIS: 60 DEGREES
EKG T AXIS: 56 DEGREES
EKG VENTRICULAR RATE: 71 BPM

## 2019-09-16 ENCOUNTER — OFFICE VISIT (OUTPATIENT)
Dept: PODIATRY | Age: 54
End: 2019-09-16
Payer: COMMERCIAL

## 2019-09-16 VITALS — RESPIRATION RATE: 16 BRPM | BODY MASS INDEX: 31.92 KG/M2 | HEIGHT: 64 IN | WEIGHT: 187 LBS

## 2019-09-16 DIAGNOSIS — M79.605 PAIN IN BOTH LOWER EXTREMITIES: ICD-10-CM

## 2019-09-16 DIAGNOSIS — M79.604 PAIN IN BOTH LOWER EXTREMITIES: ICD-10-CM

## 2019-09-16 DIAGNOSIS — B35.1 DERMATOPHYTOSIS OF NAIL: ICD-10-CM

## 2019-09-16 DIAGNOSIS — M20.41 HAMMERTOE OF RIGHT FOOT: ICD-10-CM

## 2019-09-16 DIAGNOSIS — R26.2 DIFFICULTY WALKING: Primary | ICD-10-CM

## 2019-09-16 DIAGNOSIS — L60.0 INGROWN NAIL: ICD-10-CM

## 2019-09-16 PROCEDURE — G8428 CUR MEDS NOT DOCUMENT: HCPCS | Performed by: PODIATRIST

## 2019-09-16 PROCEDURE — 99213 OFFICE O/P EST LOW 20 MIN: CPT | Performed by: PODIATRIST

## 2019-09-16 PROCEDURE — G8417 CALC BMI ABV UP PARAM F/U: HCPCS | Performed by: PODIATRIST

## 2019-09-16 PROCEDURE — 3017F COLORECTAL CA SCREEN DOC REV: CPT | Performed by: PODIATRIST

## 2019-09-16 PROCEDURE — G8598 ASA/ANTIPLAT THER USED: HCPCS | Performed by: PODIATRIST

## 2019-09-16 PROCEDURE — 4004F PT TOBACCO SCREEN RCVD TLK: CPT | Performed by: PODIATRIST

## 2019-09-16 PROCEDURE — 11721 DEBRIDE NAIL 6 OR MORE: CPT | Performed by: PODIATRIST

## 2019-09-16 NOTE — PROGRESS NOTES
NSTEMI (non-ST elevated myocardial infarction) ([facility] [address] 09/06/2018    Tobacco abuse 3/21/2018    Vitamin D deficiency 3/21/2018       Prior to Admission medications    Medication Sig Start Date End Date Taking? Authorizing Provider   metoprolol tartrate (LOPRESSOR) 25 MG tablet Take 12.5 mg by mouth 2 times daily Takes 1/2 tab (=12.5mg) BID    Historical Provider, MD   imipramine (TOFRANIL-PM) 75 MG capsule Take 75 mg by mouth nightly    Historical Provider, MD   predniSONE (DELTASONE) 20 MG tablet 1 po tid x 3 days, then 1 bid x 3 days, then 1 qd x 3 days. 9/9/19   Jacky Sargent DO   fexofenadine (ALLEGRA) 180 MG tablet Take 1 tablet by mouth daily as needed (itching) 9/5/19 10/5/19  Jacky Sargent DO   levothyroxine (SYNTHROID) 200 MCG tablet Take 200 mcg by mouth every morning (before breakfast)  7/3/19   Historical Provider, MD   atomoxetine (STRATTERA) 80 MG capsule Take 1 capsule by mouth daily 8/28/19   Jacky Sargent DO   aspirin 81 MG chewable tablet Take 1 tablet by mouth daily 8/28/19   Jacky Sargent, DO   Elastic Bandages & Supports (WRIST SPLINT/COCK-UP/LEFT M) MISC Wear on left wrist nightly for carpal tunnel. 7/3/19   Jacky Sargent DO   meloxicam (MOBIC) 15 MG tablet TAKE ONE TABLET BY MOUTH DAILY 4/22/19   Jacky Sargent DO   oxybutynin (DITROPAN XL) 15 MG extended release tablet Take 1 tablet by mouth daily 2/19/19   Jacky Sargent DO   valACYclovir (VALTREX) 500 MG tablet Take 500 mg by mouth every evening     Historical Provider, MD   nitroGLYCERIN (NITROSTAT) 0.4 MG SL tablet Place 1 tablet under tongue upon chest pain, wait 5 minutes and may repeat up to 3 doses in 15 minutes. Do not crush or break.  If no relief after 1 dose, call 911. 9/8/18   Carl lOmedo MD   atorvastatin (LIPITOR) 80 MG tablet Take 1 tablet by mouth nightly 9/8/18   Carl Olmedo MD   lisinopril (PRINIVIL;ZESTRIL) 5 MG tablet Take 1 tablet by mouth daily 9/9/18   Carl Olmedo MD   ticagrelor

## 2019-10-29 ENCOUNTER — HOSPITAL ENCOUNTER (OUTPATIENT)
Dept: PREADMISSION TESTING | Age: 54
Discharge: HOME | End: 2019-11-02
Payer: COMMERCIAL

## 2019-10-29 VITALS
WEIGHT: 194.89 LBS | HEART RATE: 64 BPM | SYSTOLIC BLOOD PRESSURE: 131 MMHG | DIASTOLIC BLOOD PRESSURE: 87 MMHG | RESPIRATION RATE: 16 BRPM | OXYGEN SATURATION: 97 % | HEIGHT: 61 IN | BODY MASS INDEX: 36.8 KG/M2

## 2019-10-29 LAB
ABSOLUTE EOS #: 0.17 K/UL (ref 0–0.44)
ABSOLUTE IMMATURE GRANULOCYTE: 0.04 K/UL (ref 0–0.3)
ABSOLUTE LYMPH #: 3.14 K/UL (ref 1.1–3.7)
ABSOLUTE MONO #: 0.52 K/UL (ref 0.1–1.2)
ANION GAP SERPL CALCULATED.3IONS-SCNC: 12 MMOL/L (ref 9–17)
BASOPHILS # BLD: 1 % (ref 0–2)
BASOPHILS ABSOLUTE: 0.05 K/UL (ref 0–0.2)
BUN BLDV-MCNC: 15 MG/DL (ref 6–20)
BUN/CREAT BLD: 21 (ref 9–20)
CALCIUM SERPL-MCNC: 9.6 MG/DL (ref 8.6–10.4)
CHLORIDE BLD-SCNC: 102 MMOL/L (ref 98–107)
CO2: 23 MMOL/L (ref 20–31)
CREAT SERPL-MCNC: 0.71 MG/DL (ref 0.5–0.9)
DIFFERENTIAL TYPE: ABNORMAL
EOSINOPHILS RELATIVE PERCENT: 2 % (ref 1–4)
GFR AFRICAN AMERICAN: >60 ML/MIN
GFR NON-AFRICAN AMERICAN: >60 ML/MIN
GFR SERPL CREATININE-BSD FRML MDRD: ABNORMAL ML/MIN/{1.73_M2}
GFR SERPL CREATININE-BSD FRML MDRD: ABNORMAL ML/MIN/{1.73_M2}
GLUCOSE BLD-MCNC: 120 MG/DL (ref 70–99)
HCT VFR BLD CALC: 41.4 % (ref 36.3–47.1)
HEMOGLOBIN: 13.6 G/DL (ref 11.9–15.1)
IMMATURE GRANULOCYTES: 1 %
LYMPHOCYTES # BLD: 38 % (ref 24–43)
MCH RBC QN AUTO: 32.3 PG (ref 25.2–33.5)
MCHC RBC AUTO-ENTMCNC: 32.9 G/DL (ref 28.4–34.8)
MCV RBC AUTO: 98.3 FL (ref 82.6–102.9)
MONOCYTES # BLD: 6 % (ref 3–12)
NRBC AUTOMATED: 0 PER 100 WBC
PDW BLD-RTO: 14.4 % (ref 11.8–14.4)
PLATELET # BLD: 223 K/UL (ref 138–453)
PLATELET ESTIMATE: ABNORMAL
PMV BLD AUTO: 10.3 FL (ref 8.1–13.5)
POTASSIUM SERPL-SCNC: 4.2 MMOL/L (ref 3.7–5.3)
RBC # BLD: 4.21 M/UL (ref 3.95–5.11)
RBC # BLD: ABNORMAL 10*6/UL
SEG NEUTROPHILS: 52 % (ref 36–65)
SEGMENTED NEUTROPHILS ABSOLUTE COUNT: 4.38 K/UL (ref 1.5–8.1)
SODIUM BLD-SCNC: 137 MMOL/L (ref 135–144)
WBC # BLD: 8.3 K/UL (ref 3.5–11.3)
WBC # BLD: ABNORMAL 10*3/UL

## 2019-10-29 PROCEDURE — 36415 COLL VENOUS BLD VENIPUNCTURE: CPT

## 2019-10-29 PROCEDURE — 80048 BASIC METABOLIC PNL TOTAL CA: CPT

## 2019-10-29 PROCEDURE — 85025 COMPLETE CBC W/AUTO DIFF WBC: CPT

## 2019-10-29 RX ORDER — LEVOTHYROXINE SODIUM 0.15 MG/1
150 TABLET ORAL DAILY
COMMUNITY
End: 2020-06-09

## 2019-10-29 ASSESSMENT — PAIN - FUNCTIONAL ASSESSMENT: PAIN_FUNCTIONAL_ASSESSMENT: PREVENTS OR INTERFERES SOME ACTIVE ACTIVITIES AND ADLS

## 2019-10-29 ASSESSMENT — PAIN DESCRIPTION - FREQUENCY: FREQUENCY: INTERMITTENT

## 2019-10-29 ASSESSMENT — PAIN DESCRIPTION - DESCRIPTORS: DESCRIPTORS: ACHING;THROBBING

## 2019-10-29 ASSESSMENT — PAIN DESCRIPTION - ORIENTATION: ORIENTATION: RIGHT

## 2019-10-29 ASSESSMENT — PAIN DESCRIPTION - LOCATION: LOCATION: FOOT

## 2019-10-29 ASSESSMENT — PAIN SCALES - GENERAL: PAINLEVEL_OUTOF10: 4

## 2019-10-29 ASSESSMENT — PAIN DESCRIPTION - PROGRESSION: CLINICAL_PROGRESSION: GRADUALLY WORSENING

## 2019-10-29 ASSESSMENT — PAIN DESCRIPTION - ONSET: ONSET: ON-GOING

## 2019-10-29 ASSESSMENT — PAIN DESCRIPTION - PAIN TYPE: TYPE: CHRONIC PAIN

## 2019-11-07 ENCOUNTER — ANESTHESIA EVENT (OUTPATIENT)
Dept: OPERATING ROOM | Age: 54
End: 2019-11-07
Payer: COMMERCIAL

## 2019-11-08 ENCOUNTER — ANESTHESIA (OUTPATIENT)
Dept: OPERATING ROOM | Age: 54
End: 2019-11-08
Payer: COMMERCIAL

## 2019-11-08 ENCOUNTER — HOSPITAL ENCOUNTER (OUTPATIENT)
Age: 54
Setting detail: OUTPATIENT SURGERY
Discharge: HOME | End: 2019-11-08
Attending: PODIATRIST | Admitting: PODIATRIST
Payer: COMMERCIAL

## 2019-11-08 VITALS
HEIGHT: 61 IN | BODY MASS INDEX: 36.8 KG/M2 | SYSTOLIC BLOOD PRESSURE: 77 MMHG | DIASTOLIC BLOOD PRESSURE: 54 MMHG | TEMPERATURE: 97.9 F | WEIGHT: 194.89 LBS | HEART RATE: 70 BPM | RESPIRATION RATE: 20 BRPM | OXYGEN SATURATION: 93 %

## 2019-11-08 VITALS — TEMPERATURE: 98.4 F | SYSTOLIC BLOOD PRESSURE: 85 MMHG | DIASTOLIC BLOOD PRESSURE: 53 MMHG | OXYGEN SATURATION: 92 %

## 2019-11-08 DIAGNOSIS — G89.18 POST-OPERATIVE PAIN: Primary | ICD-10-CM

## 2019-11-08 LAB — GLUCOSE BLD-MCNC: 108 MG/DL (ref 65–105)

## 2019-11-08 PROCEDURE — 7100000000 HC PACU RECOVERY - FIRST 15 MIN: Performed by: PODIATRIST

## 2019-11-08 PROCEDURE — 2720000010 HC SURG SUPPLY STERILE: Performed by: PODIATRIST

## 2019-11-08 PROCEDURE — 2580000003 HC RX 258: Performed by: NURSE ANESTHETIST, CERTIFIED REGISTERED

## 2019-11-08 PROCEDURE — 3700000000 HC ANESTHESIA ATTENDED CARE: Performed by: PODIATRIST

## 2019-11-08 PROCEDURE — 2709999900 HC NON-CHARGEABLE SUPPLY: Performed by: PODIATRIST

## 2019-11-08 PROCEDURE — 2580000003 HC RX 258: Performed by: ANESTHESIOLOGY

## 2019-11-08 PROCEDURE — 2500000003 HC RX 250 WO HCPCS: Performed by: NURSE ANESTHETIST, CERTIFIED REGISTERED

## 2019-11-08 PROCEDURE — 82947 ASSAY GLUCOSE BLOOD QUANT: CPT

## 2019-11-08 PROCEDURE — 28308 INCISION OF METATARSAL: CPT | Performed by: PODIATRIST

## 2019-11-08 PROCEDURE — 3700000001 HC ADD 15 MINUTES (ANESTHESIA): Performed by: PODIATRIST

## 2019-11-08 PROCEDURE — 2500000003 HC RX 250 WO HCPCS: Performed by: PODIATRIST

## 2019-11-08 PROCEDURE — C1713 ANCHOR/SCREW BN/BN,TIS/BN: HCPCS | Performed by: PODIATRIST

## 2019-11-08 PROCEDURE — 3600000013 HC SURGERY LEVEL 3 ADDTL 15MIN: Performed by: PODIATRIST

## 2019-11-08 PROCEDURE — 6360000002 HC RX W HCPCS: Performed by: NURSE ANESTHETIST, CERTIFIED REGISTERED

## 2019-11-08 PROCEDURE — 3600000003 HC SURGERY LEVEL 3 BASE: Performed by: PODIATRIST

## 2019-11-08 PROCEDURE — 7100000001 HC PACU RECOVERY - ADDTL 15 MIN: Performed by: PODIATRIST

## 2019-11-08 PROCEDURE — 7100000011 HC PHASE II RECOVERY - ADDTL 15 MIN: Performed by: PODIATRIST

## 2019-11-08 PROCEDURE — 7100000010 HC PHASE II RECOVERY - FIRST 15 MIN: Performed by: PODIATRIST

## 2019-11-08 PROCEDURE — 28285 REPAIR OF HAMMERTOE: CPT | Performed by: PODIATRIST

## 2019-11-08 DEVICE — TWIST-OFF SCREW
Type: IMPLANTABLE DEVICE | Site: SECOND TOE | Status: FUNCTIONAL
Brand: FIXOS

## 2019-11-08 RX ORDER — ONDANSETRON 2 MG/ML
INJECTION INTRAMUSCULAR; INTRAVENOUS PRN
Status: DISCONTINUED | OUTPATIENT
Start: 2019-11-08 | End: 2019-11-08 | Stop reason: SDUPTHER

## 2019-11-08 RX ORDER — DEXAMETHASONE SODIUM PHOSPHATE 10 MG/ML
4 INJECTION INTRAMUSCULAR; INTRAVENOUS
Status: DISCONTINUED | OUTPATIENT
Start: 2019-11-08 | End: 2019-11-08 | Stop reason: HOSPADM

## 2019-11-08 RX ORDER — SODIUM CHLORIDE 9 MG/ML
INJECTION, SOLUTION INTRAVENOUS CONTINUOUS
Status: DISCONTINUED | OUTPATIENT
Start: 2019-11-09 | End: 2019-11-08 | Stop reason: HOSPADM

## 2019-11-08 RX ORDER — OXYCODONE HYDROCHLORIDE AND ACETAMINOPHEN 5; 325 MG/1; MG/1
1 TABLET ORAL EVERY 6 HOURS PRN
Qty: 28 TABLET | Refills: 0 | Status: SHIPPED | OUTPATIENT
Start: 2019-11-08 | End: 2019-11-15

## 2019-11-08 RX ORDER — HYDRALAZINE HYDROCHLORIDE 20 MG/ML
5 INJECTION INTRAMUSCULAR; INTRAVENOUS EVERY 10 MIN PRN
Status: DISCONTINUED | OUTPATIENT
Start: 2019-11-08 | End: 2019-11-08 | Stop reason: HOSPADM

## 2019-11-08 RX ORDER — FENTANYL CITRATE 50 UG/ML
25 INJECTION, SOLUTION INTRAMUSCULAR; INTRAVENOUS EVERY 5 MIN PRN
Status: DISCONTINUED | OUTPATIENT
Start: 2019-11-08 | End: 2019-11-08 | Stop reason: HOSPADM

## 2019-11-08 RX ORDER — FENTANYL CITRATE 50 UG/ML
INJECTION, SOLUTION INTRAMUSCULAR; INTRAVENOUS PRN
Status: DISCONTINUED | OUTPATIENT
Start: 2019-11-08 | End: 2019-11-08 | Stop reason: SDUPTHER

## 2019-11-08 RX ORDER — CEFAZOLIN SODIUM 1 G/3ML
INJECTION, POWDER, FOR SOLUTION INTRAMUSCULAR; INTRAVENOUS PRN
Status: DISCONTINUED | OUTPATIENT
Start: 2019-11-08 | End: 2019-11-08 | Stop reason: SDUPTHER

## 2019-11-08 RX ORDER — OXYCODONE HYDROCHLORIDE AND ACETAMINOPHEN 5; 325 MG/1; MG/1
2 TABLET ORAL PRN
Status: DISCONTINUED | OUTPATIENT
Start: 2019-11-08 | End: 2019-11-08 | Stop reason: HOSPADM

## 2019-11-08 RX ORDER — DIPHENHYDRAMINE HYDROCHLORIDE 50 MG/ML
12.5 INJECTION INTRAMUSCULAR; INTRAVENOUS
Status: DISCONTINUED | OUTPATIENT
Start: 2019-11-08 | End: 2019-11-08 | Stop reason: HOSPADM

## 2019-11-08 RX ORDER — SODIUM CHLORIDE, SODIUM LACTATE, POTASSIUM CHLORIDE, CALCIUM CHLORIDE 600; 310; 30; 20 MG/100ML; MG/100ML; MG/100ML; MG/100ML
INJECTION, SOLUTION INTRAVENOUS CONTINUOUS PRN
Status: DISCONTINUED | OUTPATIENT
Start: 2019-11-08 | End: 2019-11-08 | Stop reason: SDUPTHER

## 2019-11-08 RX ORDER — SODIUM CHLORIDE 0.9 % (FLUSH) 0.9 %
10 SYRINGE (ML) INJECTION PRN
Status: DISCONTINUED | OUTPATIENT
Start: 2019-11-08 | End: 2019-11-08 | Stop reason: HOSPADM

## 2019-11-08 RX ORDER — HYDROMORPHONE HCL 110MG/55ML
0.5 PATIENT CONTROLLED ANALGESIA SYRINGE INTRAVENOUS EVERY 5 MIN PRN
Status: DISCONTINUED | OUTPATIENT
Start: 2019-11-08 | End: 2019-11-08 | Stop reason: HOSPADM

## 2019-11-08 RX ORDER — MIDAZOLAM HYDROCHLORIDE 1 MG/ML
INJECTION INTRAMUSCULAR; INTRAVENOUS PRN
Status: DISCONTINUED | OUTPATIENT
Start: 2019-11-08 | End: 2019-11-08 | Stop reason: SDUPTHER

## 2019-11-08 RX ORDER — SODIUM CHLORIDE 0.9 % (FLUSH) 0.9 %
10 SYRINGE (ML) INJECTION EVERY 12 HOURS SCHEDULED
Status: DISCONTINUED | OUTPATIENT
Start: 2019-11-08 | End: 2019-11-08 | Stop reason: HOSPADM

## 2019-11-08 RX ORDER — OXYCODONE HYDROCHLORIDE AND ACETAMINOPHEN 5; 325 MG/1; MG/1
1 TABLET ORAL PRN
Status: DISCONTINUED | OUTPATIENT
Start: 2019-11-08 | End: 2019-11-08 | Stop reason: HOSPADM

## 2019-11-08 RX ORDER — SODIUM CHLORIDE, SODIUM LACTATE, POTASSIUM CHLORIDE, CALCIUM CHLORIDE 600; 310; 30; 20 MG/100ML; MG/100ML; MG/100ML; MG/100ML
INJECTION, SOLUTION INTRAVENOUS CONTINUOUS
Status: DISCONTINUED | OUTPATIENT
Start: 2019-11-09 | End: 2019-11-08 | Stop reason: HOSPADM

## 2019-11-08 RX ORDER — PROPOFOL 10 MG/ML
INJECTION, EMULSION INTRAVENOUS CONTINUOUS PRN
Status: DISCONTINUED | OUTPATIENT
Start: 2019-11-08 | End: 2019-11-08 | Stop reason: SDUPTHER

## 2019-11-08 RX ORDER — LIDOCAINE HYDROCHLORIDE 10 MG/ML
1 INJECTION, SOLUTION EPIDURAL; INFILTRATION; INTRACAUDAL; PERINEURAL
Status: DISCONTINUED | OUTPATIENT
Start: 2019-11-09 | End: 2019-11-08 | Stop reason: HOSPADM

## 2019-11-08 RX ORDER — LABETALOL HYDROCHLORIDE 5 MG/ML
5 INJECTION, SOLUTION INTRAVENOUS EVERY 10 MIN PRN
Status: DISCONTINUED | OUTPATIENT
Start: 2019-11-08 | End: 2019-11-08 | Stop reason: HOSPADM

## 2019-11-08 RX ORDER — ONDANSETRON 2 MG/ML
4 INJECTION INTRAMUSCULAR; INTRAVENOUS
Status: DISCONTINUED | OUTPATIENT
Start: 2019-11-08 | End: 2019-11-08 | Stop reason: HOSPADM

## 2019-11-08 RX ORDER — LIDOCAINE HYDROCHLORIDE 10 MG/ML
INJECTION, SOLUTION EPIDURAL; INFILTRATION; INTRACAUDAL; PERINEURAL PRN
Status: DISCONTINUED | OUTPATIENT
Start: 2019-11-08 | End: 2019-11-08 | Stop reason: ALTCHOICE

## 2019-11-08 RX ORDER — GLYCOPYRROLATE 1 MG/5 ML
SYRINGE (ML) INTRAVENOUS PRN
Status: DISCONTINUED | OUTPATIENT
Start: 2019-11-08 | End: 2019-11-08 | Stop reason: SDUPTHER

## 2019-11-08 RX ADMIN — PROPOFOL 50 MCG/KG/MIN: 10 INJECTION, EMULSION INTRAVENOUS at 10:38

## 2019-11-08 RX ADMIN — Medication 25 MCG: at 10:52

## 2019-11-08 RX ADMIN — ONDANSETRON 4 MG: 2 INJECTION, SOLUTION INTRAMUSCULAR; INTRAVENOUS at 11:26

## 2019-11-08 RX ADMIN — Medication 25 MCG: at 11:33

## 2019-11-08 RX ADMIN — SODIUM CHLORIDE, POTASSIUM CHLORIDE, SODIUM LACTATE AND CALCIUM CHLORIDE: 600; 310; 30; 20 INJECTION, SOLUTION INTRAVENOUS at 10:35

## 2019-11-08 RX ADMIN — PHENYLEPHRINE HYDROCHLORIDE 100 MCG: 10 INJECTION INTRAVENOUS at 11:05

## 2019-11-08 RX ADMIN — PHENYLEPHRINE HYDROCHLORIDE 50 MCG: 10 INJECTION INTRAVENOUS at 11:19

## 2019-11-08 RX ADMIN — Medication 25 MCG: at 11:42

## 2019-11-08 RX ADMIN — SODIUM CHLORIDE, POTASSIUM CHLORIDE, SODIUM LACTATE AND CALCIUM CHLORIDE: 600; 310; 30; 20 INJECTION, SOLUTION INTRAVENOUS at 11:18

## 2019-11-08 RX ADMIN — PHENYLEPHRINE HYDROCHLORIDE 100 MCG: 10 INJECTION INTRAVENOUS at 11:33

## 2019-11-08 RX ADMIN — SODIUM CHLORIDE, POTASSIUM CHLORIDE, SODIUM LACTATE AND CALCIUM CHLORIDE: 600; 310; 30; 20 INJECTION, SOLUTION INTRAVENOUS at 10:26

## 2019-11-08 RX ADMIN — PHENYLEPHRINE HYDROCHLORIDE 50 MCG: 10 INJECTION INTRAVENOUS at 11:13

## 2019-11-08 RX ADMIN — Medication 25 MCG: at 10:38

## 2019-11-08 RX ADMIN — MIDAZOLAM 2 MG: 1 INJECTION INTRAMUSCULAR; INTRAVENOUS at 10:35

## 2019-11-08 RX ADMIN — Medication 0.3 MG: at 11:00

## 2019-11-08 RX ADMIN — CEFAZOLIN 2000 MG: 1 INJECTION, POWDER, FOR SOLUTION INTRAMUSCULAR; INTRAVENOUS at 10:45

## 2019-11-08 ASSESSMENT — PULMONARY FUNCTION TESTS
PIF_VALUE: 1
PIF_VALUE: 0
PIF_VALUE: 1
PIF_VALUE: 0
PIF_VALUE: 1
PIF_VALUE: 0
PIF_VALUE: 1
PIF_VALUE: 0
PIF_VALUE: 1
PIF_VALUE: 0
PIF_VALUE: 1
PIF_VALUE: 0
PIF_VALUE: 1
PIF_VALUE: 0
PIF_VALUE: 1
PIF_VALUE: 0
PIF_VALUE: 1
PIF_VALUE: 0
PIF_VALUE: 0
PIF_VALUE: 1
PIF_VALUE: 0

## 2019-11-08 ASSESSMENT — PAIN SCALES - GENERAL
PAINLEVEL_OUTOF10: 0

## 2019-11-08 ASSESSMENT — PAIN - FUNCTIONAL ASSESSMENT: PAIN_FUNCTIONAL_ASSESSMENT: 0-10

## 2019-11-13 ENCOUNTER — OFFICE VISIT (OUTPATIENT)
Dept: PODIATRY | Age: 54
End: 2019-11-13

## 2019-11-13 VITALS — HEIGHT: 61 IN | RESPIRATION RATE: 16 BRPM | BODY MASS INDEX: 36.63 KG/M2 | WEIGHT: 194 LBS

## 2019-11-13 DIAGNOSIS — Z98.890 POST-OPERATIVE STATE: Primary | ICD-10-CM

## 2019-11-13 PROCEDURE — 99024 POSTOP FOLLOW-UP VISIT: CPT | Performed by: PODIATRIST

## 2019-11-20 ENCOUNTER — OFFICE VISIT (OUTPATIENT)
Dept: PODIATRY | Age: 54
End: 2019-11-20

## 2019-11-20 VITALS — WEIGHT: 194 LBS | HEIGHT: 61 IN | RESPIRATION RATE: 16 BRPM | BODY MASS INDEX: 36.63 KG/M2

## 2019-11-20 DIAGNOSIS — Z98.890 POST-OPERATIVE STATE: Primary | ICD-10-CM

## 2019-11-20 PROCEDURE — 99024 POSTOP FOLLOW-UP VISIT: CPT | Performed by: PODIATRIST

## 2019-12-18 ENCOUNTER — OFFICE VISIT (OUTPATIENT)
Dept: PODIATRY | Age: 54
End: 2019-12-18

## 2019-12-18 VITALS — RESPIRATION RATE: 16 BRPM | BODY MASS INDEX: 37.38 KG/M2 | HEIGHT: 61 IN | WEIGHT: 198 LBS

## 2019-12-18 DIAGNOSIS — R26.2 DIFFICULTY WALKING: ICD-10-CM

## 2019-12-18 DIAGNOSIS — Z98.890 POST-OPERATIVE STATE: Primary | ICD-10-CM

## 2019-12-18 PROCEDURE — 99024 POSTOP FOLLOW-UP VISIT: CPT | Performed by: PODIATRIST

## 2020-01-27 ENCOUNTER — OFFICE VISIT (OUTPATIENT)
Dept: PODIATRY | Age: 55
End: 2020-01-27

## 2020-01-27 VITALS — HEIGHT: 61 IN | RESPIRATION RATE: 16 BRPM | WEIGHT: 198 LBS | BODY MASS INDEX: 37.38 KG/M2

## 2020-01-27 PROCEDURE — 99024 POSTOP FOLLOW-UP VISIT: CPT | Performed by: PODIATRIST

## 2020-02-24 ENCOUNTER — OFFICE VISIT (OUTPATIENT)
Dept: PODIATRY | Age: 55
End: 2020-02-24
Payer: COMMERCIAL

## 2020-02-24 VITALS — BODY MASS INDEX: 38.14 KG/M2 | WEIGHT: 202 LBS | RESPIRATION RATE: 16 BRPM | HEIGHT: 61 IN

## 2020-02-24 PROCEDURE — G8482 FLU IMMUNIZE ORDER/ADMIN: HCPCS | Performed by: PODIATRIST

## 2020-02-24 PROCEDURE — G8417 CALC BMI ABV UP PARAM F/U: HCPCS | Performed by: PODIATRIST

## 2020-02-24 PROCEDURE — 1036F TOBACCO NON-USER: CPT | Performed by: PODIATRIST

## 2020-02-24 PROCEDURE — G8427 DOCREV CUR MEDS BY ELIG CLIN: HCPCS | Performed by: PODIATRIST

## 2020-02-24 PROCEDURE — 99213 OFFICE O/P EST LOW 20 MIN: CPT | Performed by: PODIATRIST

## 2020-02-24 PROCEDURE — 3017F COLORECTAL CA SCREEN DOC REV: CPT | Performed by: PODIATRIST

## 2020-02-24 NOTE — PROGRESS NOTES
[facility] PHYSICIANS  [facility]  [address] [place] [address]  Dept: [phone]  Dept Fax: [phone]    RETURN PATIENT PROGRESS NOTE  Date of patient's visit: 2/24/2020  Patient's Name:  Aleks Scott YOB: 1965            Patient Care Team:  Juan Martines DO as PCP - General (Family Medicine)  Juan Martines DO as PCP - [facility]  Nathanael Whitten MD (Physical Medicine and Rehab)  Conrad Luis MD as Consulting Physician (Endocrinology)  Casey Segal MD (Obstetrics & Gynecology)  Keri Tinoco MD (Urology)  Swetha Ochoa MD as Consulting Physician (Cardiology)  Osorio Lei DPM as Physician (Podiatry)  Mireya Fortune MD as Surgeon (Orthopedic Surgery)       Feliberto Current Cioroch 48 y.o. female that presents for follow-up of 2nd right toe pain at previous surgery site. Also possible suture still in toe  Chief Complaint   Patient presents with    Toe Pain     right 2nd     Pt's primary care physician is Juan Martines DO last seen 2/19/20  Symptoms began 5 month(s) ago and are unchanged . Patient relates pain is Present. Pain is rated 5 out of 10 and is described as intermittent. Treatments prior to today's visit include: previous  surgery to correct . Currently denies F/C/N/V. Allergies   Allergen Reactions    Latex      itch    Beef-Derived Products      Patient does not eat green vegetables and no beef products. Patient does eat salads and other types of meat.     Clonidine Derivatives Other (See Comments)     Urinary issues    Demerol Hcl [Meperidine]      Palpations (heart)       Past Medical History:   Diagnosis Date    ADHD     Arthritis 9/13/2018    Bowel and bladder incontinence     pt is on a bowel maintance program     CAD (coronary artery disease) 09/07/2018    x 2 stents  LAD    Cardiac murmur     Cloacal anomaly born with it    had colostomy shortly after

## 2020-03-26 PROBLEM — M75.40 IMPINGEMENT SYNDROME OF SHOULDER REGION: Status: ACTIVE | Noted: 2019-08-21

## 2020-06-09 ENCOUNTER — OFFICE VISIT (OUTPATIENT)
Dept: PODIATRY | Age: 55
End: 2020-06-09
Payer: COMMERCIAL

## 2020-06-09 VITALS — BODY MASS INDEX: 35.87 KG/M2 | WEIGHT: 190 LBS | RESPIRATION RATE: 16 BRPM | HEIGHT: 61 IN | TEMPERATURE: 98.7 F

## 2020-06-09 PROCEDURE — 99213 OFFICE O/P EST LOW 20 MIN: CPT | Performed by: PODIATRIST

## 2020-06-09 PROCEDURE — G8417 CALC BMI ABV UP PARAM F/U: HCPCS | Performed by: PODIATRIST

## 2020-06-09 PROCEDURE — G8427 DOCREV CUR MEDS BY ELIG CLIN: HCPCS | Performed by: PODIATRIST

## 2020-06-09 PROCEDURE — 3017F COLORECTAL CA SCREEN DOC REV: CPT | Performed by: PODIATRIST

## 2020-06-09 PROCEDURE — 1036F TOBACCO NON-USER: CPT | Performed by: PODIATRIST

## 2020-06-09 RX ORDER — TRAMADOL HYDROCHLORIDE 50 MG/1
50 TABLET ORAL EVERY 4 HOURS PRN
Qty: 42 TABLET | Refills: 0 | Status: SHIPPED | OUTPATIENT
Start: 2020-06-09 | End: 2020-06-16

## 2020-06-09 RX ORDER — LEVOTHYROXINE SODIUM 175 UG/1
250 TABLET ORAL
COMMUNITY
Start: 2020-06-01

## 2020-06-09 NOTE — PROGRESS NOTES
[facility] PHYSICIANS  [facility]  [address] [place] [address]  Dept: [phone]  Dept Fax: [phone]    RETURN PATIENT PROGRESS NOTE  Date of patient's visit: 6/9/2020  Patient's Name:  Brant Ghotra YOB: 1965            Patient Care Team:  Vicki Avendaño DO as PCP - General (Family Medicine)  Vicki Avendaño DO as PCP - [facility] Empaneled Provider  Valerie Samuel MD (Physical Medicine and Rehab)  Dennis Mabry MD as Consulting Physician (Endocrinology)  Saadia Saeed MD (Obstetrics & Gynecology)  Denny Monaco MD (Urology)  Frederick Mancini MD as Consulting Physician (Cardiology)  Shahida Kat DPM as Physician (Podiatry)  Alexa Sandifer, MD as Surgeon (Orthopedic Surgery)  Valente Reyes DPM as Physician (Podiatry)       Mando Gonzalez 48 y.o. female that presents for follow-up of   Chief Complaint   Patient presents with    Foot Swelling    Toe Pain     Pt's primary care physician is Vicki Avendaño DO last seen 05/27/2020  Symptoms began 6 month(s) ago and are unchanged . Patient relates pain is Present. Pain is rated 8 out of 10 and is described as constant, severe. Treatments prior to today's visit include: previous hammertoe surgery. Pt states she still has metatarsal pain. Currently denies F/C/N/V. Allergies   Allergen Reactions    Latex      itch    Beef-Derived Products      Patient does not eat green vegetables and no beef products. Patient does eat salads and other types of meat.    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

## 2020-06-16 ENCOUNTER — TELEPHONE (OUTPATIENT)
Dept: PODIATRY | Age: 55
End: 2020-06-16

## 2020-06-16 NOTE — TELEPHONE ENCOUNTER
Patient called and stated she is having pain with the bottom and top of her right foot. She states she has been soaking her foot in epsom salt and water as well as icing her foot. She states she does have an ankle brace and would like to know if she should begin wearing it.

## 2021-10-01 ENCOUNTER — HOSPITAL ENCOUNTER (OUTPATIENT)
Dept: GENERAL RADIOLOGY | Age: 56
Discharge: HOME | End: 2021-10-03
Payer: COMMERCIAL

## 2021-10-01 ENCOUNTER — HOSPITAL ENCOUNTER (OUTPATIENT)
Age: 56
Discharge: HOME | End: 2021-10-03
Payer: COMMERCIAL

## 2021-10-01 DIAGNOSIS — Z86.69 HISTORY OF TETHERED SPINAL CORD: ICD-10-CM

## 2021-10-01 DIAGNOSIS — M54.16 LUMBAR RADICULAR PAIN: ICD-10-CM

## 2021-10-01 PROCEDURE — 72100 X-RAY EXAM L-S SPINE 2/3 VWS: CPT

## 2021-11-03 ENCOUNTER — HOSPITAL ENCOUNTER (OUTPATIENT)
Dept: CT IMAGING | Age: 56
Discharge: HOME | End: 2021-11-05
Payer: COMMERCIAL

## 2021-11-03 DIAGNOSIS — M47.896 OTHER SPONDYLOSIS, LUMBAR REGION: ICD-10-CM

## 2021-11-03 PROCEDURE — 72131 CT LUMBAR SPINE W/O DYE: CPT

## 2021-11-13 ENCOUNTER — HOSPITAL ENCOUNTER (OUTPATIENT)
Dept: GENERAL RADIOLOGY | Age: 56
Discharge: HOME | End: 2021-11-15
Payer: COMMERCIAL

## 2021-11-13 ENCOUNTER — HOSPITAL ENCOUNTER (OUTPATIENT)
Age: 56
Discharge: HOME | End: 2021-11-13
Payer: COMMERCIAL

## 2021-11-13 ENCOUNTER — HOSPITAL ENCOUNTER (OUTPATIENT)
Age: 56
Discharge: HOME | End: 2021-11-15
Payer: COMMERCIAL

## 2021-11-13 DIAGNOSIS — R73.09 ELEVATED GLUCOSE: ICD-10-CM

## 2021-11-13 DIAGNOSIS — E03.9 HYPOTHYROIDISM, UNSPECIFIED TYPE: ICD-10-CM

## 2021-11-13 DIAGNOSIS — Z01.818 PREOP EXAMINATION: ICD-10-CM

## 2021-11-13 DIAGNOSIS — E78.5 HYPERLIPIDEMIA, UNSPECIFIED HYPERLIPIDEMIA TYPE: ICD-10-CM

## 2021-11-13 LAB
ABSOLUTE EOS #: 0.12 K/UL (ref 0–0.44)
ABSOLUTE IMMATURE GRANULOCYTE: <0.03 K/UL (ref 0–0.3)
ABSOLUTE LYMPH #: 2.29 K/UL (ref 1.1–3.7)
ABSOLUTE MONO #: 0.44 K/UL (ref 0.1–1.2)
ALT SERPL-CCNC: 18 U/L (ref 5–33)
ANION GAP SERPL CALCULATED.3IONS-SCNC: 9 MMOL/L (ref 9–17)
AST SERPL-CCNC: 21 U/L
BASOPHILS # BLD: 1 % (ref 0–2)
BASOPHILS ABSOLUTE: 0.04 K/UL (ref 0–0.2)
BUN BLDV-MCNC: 14 MG/DL (ref 6–20)
BUN/CREAT BLD: ABNORMAL (ref 9–20)
CALCIUM SERPL-MCNC: 9.7 MG/DL (ref 8.6–10.4)
CHLORIDE BLD-SCNC: 107 MMOL/L (ref 98–107)
CHOLESTEROL, FASTING: 117 MG/DL
CHOLESTEROL, FASTING: 117 MG/DL
CHOLESTEROL/HDL RATIO: 2.1
CHOLESTEROL/HDL RATIO: 2.1
CO2: 24 MMOL/L (ref 20–31)
CREAT SERPL-MCNC: 0.68 MG/DL (ref 0.5–0.9)
DIFFERENTIAL TYPE: NORMAL
EOSINOPHILS RELATIVE PERCENT: 2 % (ref 1–4)
GFR AFRICAN AMERICAN: >60 ML/MIN
GFR NON-AFRICAN AMERICAN: >60 ML/MIN
GFR SERPL CREATININE-BSD FRML MDRD: ABNORMAL ML/MIN/{1.73_M2}
GFR SERPL CREATININE-BSD FRML MDRD: ABNORMAL ML/MIN/{1.73_M2}
GLUCOSE BLD-MCNC: 120 MG/DL (ref 70–99)
HCT VFR BLD CALC: 43.9 % (ref 36.3–47.1)
HDLC SERPL-MCNC: 55 MG/DL
HDLC SERPL-MCNC: 55 MG/DL
HEMOGLOBIN: 14.2 G/DL (ref 11.9–15.1)
IMMATURE GRANULOCYTES: 0 %
INR BLD: 1
LDL CHOLESTEROL: 43 MG/DL (ref 0–130)
LDL CHOLESTEROL: 43 MG/DL (ref 0–130)
LYMPHOCYTES # BLD: 38 % (ref 24–43)
MCH RBC QN AUTO: 32.7 PG (ref 25.2–33.5)
MCHC RBC AUTO-ENTMCNC: 32.3 G/DL (ref 28.4–34.8)
MCV RBC AUTO: 101.2 FL (ref 82.6–102.9)
MONOCYTES # BLD: 7 % (ref 3–12)
NRBC AUTOMATED: 0 PER 100 WBC
PARTIAL THROMBOPLASTIN TIME: 36.9 SEC (ref 23.9–33.8)
PDW BLD-RTO: 14.3 % (ref 11.8–14.4)
PLATELET # BLD: 211 K/UL (ref 138–453)
PLATELET ESTIMATE: NORMAL
PMV BLD AUTO: 10.6 FL (ref 8.1–13.5)
POTASSIUM SERPL-SCNC: 5.3 MMOL/L (ref 3.7–5.3)
PROTHROMBIN TIME: 12.7 SEC (ref 11.5–14.2)
RBC # BLD: 4.34 M/UL (ref 3.95–5.11)
RBC # BLD: NORMAL 10*6/UL
SEG NEUTROPHILS: 52 % (ref 36–65)
SEGMENTED NEUTROPHILS ABSOLUTE COUNT: 3.11 K/UL (ref 1.5–8.1)
SODIUM BLD-SCNC: 140 MMOL/L (ref 135–144)
THYROXINE, FREE: 1.52 NG/DL (ref 0.93–1.7)
TRIGLYCERIDE, FASTING: 95 MG/DL
TRIGLYCERIDE, FASTING: 95 MG/DL
TSH SERPL DL<=0.05 MIU/L-ACNC: 8.46 MIU/L (ref 0.3–5)
VLDLC SERPL CALC-MCNC: NORMAL MG/DL (ref 1–30)
VLDLC SERPL CALC-MCNC: NORMAL MG/DL (ref 1–30)
WBC # BLD: 6 K/UL (ref 3.5–11.3)
WBC # BLD: NORMAL 10*3/UL

## 2021-11-13 PROCEDURE — 85730 THROMBOPLASTIN TIME PARTIAL: CPT

## 2021-11-13 PROCEDURE — 85610 PROTHROMBIN TIME: CPT

## 2021-11-13 PROCEDURE — 84439 ASSAY OF FREE THYROXINE: CPT

## 2021-11-13 PROCEDURE — 71046 X-RAY EXAM CHEST 2 VIEWS: CPT

## 2021-11-13 PROCEDURE — 36415 COLL VENOUS BLD VENIPUNCTURE: CPT

## 2021-11-13 PROCEDURE — 80048 BASIC METABOLIC PNL TOTAL CA: CPT

## 2021-11-13 PROCEDURE — 80061 LIPID PANEL: CPT

## 2021-11-13 PROCEDURE — 84450 TRANSFERASE (AST) (SGOT): CPT

## 2021-11-13 PROCEDURE — 84460 ALANINE AMINO (ALT) (SGPT): CPT

## 2021-11-13 PROCEDURE — 84443 ASSAY THYROID STIM HORMONE: CPT

## 2021-11-13 PROCEDURE — 85025 COMPLETE CBC W/AUTO DIFF WBC: CPT

## 2021-11-13 PROCEDURE — 83036 HEMOGLOBIN GLYCOSYLATED A1C: CPT

## 2021-11-14 LAB
ESTIMATED AVERAGE GLUCOSE: 143 MG/DL
HBA1C MFR BLD: 6.6 % (ref 4–6)

## 2022-05-21 ENCOUNTER — HOSPITAL ENCOUNTER (OUTPATIENT)
Dept: MRI IMAGING | Age: 57
Discharge: HOME | End: 2022-05-23
Payer: COMMERCIAL

## 2022-05-21 DIAGNOSIS — M43.16 SPONDYLOLISTHESIS, LUMBAR REGION: ICD-10-CM

## 2022-05-21 LAB
GFR NON-AFRICAN AMERICAN: >60 ML/MIN
GFR SERPL CREATININE-BSD FRML MDRD: >60 ML/MIN
GFR SERPL CREATININE-BSD FRML MDRD: NORMAL ML/MIN/{1.73_M2}
POC CREATININE: 0.84 MG/DL (ref 0.51–1.19)

## 2022-05-21 PROCEDURE — A9579 GAD-BASE MR CONTRAST NOS,1ML: HCPCS | Performed by: NEUROLOGICAL SURGERY

## 2022-05-21 PROCEDURE — 72158 MRI LUMBAR SPINE W/O & W/DYE: CPT

## 2022-05-21 PROCEDURE — 82565 ASSAY OF CREATININE: CPT

## 2022-05-21 PROCEDURE — 6360000004 HC RX CONTRAST MEDICATION: Performed by: NEUROLOGICAL SURGERY

## 2022-05-21 RX ADMIN — GADOTERIDOL 20 ML: 279.3 INJECTION, SOLUTION INTRAVENOUS at 11:07

## 2023-02-18 ENCOUNTER — HOSPITAL ENCOUNTER (INPATIENT)
Age: 58
LOS: 3 days | Discharge: HOME | DRG: 247 | End: 2023-02-21
Attending: EMERGENCY MEDICINE | Admitting: FAMILY MEDICINE
Payer: COMMERCIAL

## 2023-02-18 ENCOUNTER — APPOINTMENT (OUTPATIENT)
Dept: GENERAL RADIOLOGY | Age: 58
DRG: 247 | End: 2023-02-18
Payer: COMMERCIAL

## 2023-02-18 DIAGNOSIS — R07.89 ATYPICAL CHEST PAIN: Primary | ICD-10-CM

## 2023-02-18 LAB
ABSOLUTE EOS #: 0.08 K/UL (ref 0–0.44)
ABSOLUTE IMMATURE GRANULOCYTE: 0.02 K/UL (ref 0–0.3)
ABSOLUTE LYMPH #: 2.37 K/UL (ref 1.1–3.7)
ABSOLUTE MONO #: 0.41 K/UL (ref 0.1–1.2)
ANION GAP SERPL CALCULATED.3IONS-SCNC: 11 MMOL/L (ref 9–17)
BASOPHILS # BLD: 0 % (ref 0–2)
BASOPHILS ABSOLUTE: <0.03 K/UL (ref 0–0.2)
BUN SERPL-MCNC: 13 MG/DL (ref 6–20)
BUN/CREAT BLD: 18 (ref 9–20)
CALCIUM SERPL-MCNC: 9.3 MG/DL (ref 8.6–10.4)
CHLORIDE SERPL-SCNC: 105 MMOL/L (ref 98–107)
CO2 SERPL-SCNC: 23 MMOL/L (ref 20–31)
CREAT SERPL-MCNC: 0.74 MG/DL (ref 0.5–0.9)
EOSINOPHILS RELATIVE PERCENT: 1 % (ref 1–4)
GFR SERPL CREATININE-BSD FRML MDRD: >60 ML/MIN/1.73M2
GLUCOSE SERPL-MCNC: 105 MG/DL (ref 70–99)
HCT VFR BLD AUTO: 43.5 % (ref 36.3–47.1)
HGB BLD-MCNC: 14.2 G/DL (ref 11.9–15.1)
IMMATURE GRANULOCYTES: 0 %
LYMPHOCYTES # BLD: 31 % (ref 24–43)
MCH RBC QN AUTO: 32.3 PG (ref 25.2–33.5)
MCHC RBC AUTO-ENTMCNC: 32.6 G/DL (ref 28.4–34.8)
MCV RBC AUTO: 98.9 FL (ref 82.6–102.9)
MONOCYTES # BLD: 5 % (ref 3–12)
NRBC AUTOMATED: 0 PER 100 WBC
PDW BLD-RTO: 13 % (ref 11.8–14.4)
PLATELET # BLD AUTO: 175 K/UL (ref 138–453)
PMV BLD AUTO: 9.9 FL (ref 8.1–13.5)
POTASSIUM SERPL-SCNC: 4.3 MMOL/L (ref 3.7–5.3)
RBC # BLD: 4.4 M/UL (ref 3.95–5.11)
SEG NEUTROPHILS: 63 % (ref 36–65)
SEGMENTED NEUTROPHILS ABSOLUTE COUNT: 4.71 K/UL (ref 1.5–8.1)
SODIUM SERPL-SCNC: 139 MMOL/L (ref 135–144)
TROPONIN I SERPL DL<=0.01 NG/ML-MCNC: 9 NG/L (ref 0–14)
WBC # BLD AUTO: 7.6 K/UL (ref 3.5–11.3)

## 2023-02-18 PROCEDURE — 84484 ASSAY OF TROPONIN QUANT: CPT

## 2023-02-18 PROCEDURE — 71045 X-RAY EXAM CHEST 1 VIEW: CPT

## 2023-02-18 PROCEDURE — 80048 BASIC METABOLIC PNL TOTAL CA: CPT

## 2023-02-18 PROCEDURE — 2580000003 HC RX 258: Performed by: NURSE PRACTITIONER

## 2023-02-18 PROCEDURE — 6370000000 HC RX 637 (ALT 250 FOR IP): Performed by: EMERGENCY MEDICINE

## 2023-02-18 PROCEDURE — 6360000002 HC RX W HCPCS: Performed by: NURSE PRACTITIONER

## 2023-02-18 PROCEDURE — 99285 EMERGENCY DEPT VISIT HI MDM: CPT

## 2023-02-18 PROCEDURE — 36415 COLL VENOUS BLD VENIPUNCTURE: CPT

## 2023-02-18 PROCEDURE — 93005 ELECTROCARDIOGRAM TRACING: CPT | Performed by: EMERGENCY MEDICINE

## 2023-02-18 PROCEDURE — 85025 COMPLETE CBC W/AUTO DIFF WBC: CPT

## 2023-02-18 PROCEDURE — 1200000000 HC SEMI PRIVATE

## 2023-02-18 RX ORDER — MIRABEGRON 50 MG/1
TABLET, FILM COATED, EXTENDED RELEASE ORAL
COMMUNITY
Start: 2022-12-26

## 2023-02-18 RX ORDER — SODIUM CHLORIDE 0.9 % (FLUSH) 0.9 %
10 SYRINGE (ML) INJECTION PRN
Status: DISCONTINUED | OUTPATIENT
Start: 2023-02-18 | End: 2023-02-21 | Stop reason: HOSPADM

## 2023-02-18 RX ORDER — POLYETHYLENE GLYCOL 3350 17 G/17G
17 POWDER, FOR SOLUTION ORAL DAILY PRN
Status: DISCONTINUED | OUTPATIENT
Start: 2023-02-18 | End: 2023-02-21 | Stop reason: HOSPADM

## 2023-02-18 RX ORDER — POTASSIUM CHLORIDE 7.45 MG/ML
10 INJECTION INTRAVENOUS PRN
Status: DISCONTINUED | OUTPATIENT
Start: 2023-02-18 | End: 2023-02-21 | Stop reason: HOSPADM

## 2023-02-18 RX ORDER — ASPIRIN 81 MG/1
324 TABLET, CHEWABLE ORAL ONCE
Status: COMPLETED | OUTPATIENT
Start: 2023-02-18 | End: 2023-02-18

## 2023-02-18 RX ORDER — ONDANSETRON 4 MG/1
4 TABLET, ORALLY DISINTEGRATING ORAL EVERY 8 HOURS PRN
Status: DISCONTINUED | OUTPATIENT
Start: 2023-02-18 | End: 2023-02-21 | Stop reason: HOSPADM

## 2023-02-18 RX ORDER — MAGNESIUM SULFATE 1 G/100ML
1000 INJECTION INTRAVENOUS PRN
Status: DISCONTINUED | OUTPATIENT
Start: 2023-02-18 | End: 2023-02-21 | Stop reason: HOSPADM

## 2023-02-18 RX ORDER — ONDANSETRON 2 MG/ML
4 INJECTION INTRAMUSCULAR; INTRAVENOUS EVERY 6 HOURS PRN
Status: DISCONTINUED | OUTPATIENT
Start: 2023-02-18 | End: 2023-02-21 | Stop reason: HOSPADM

## 2023-02-18 RX ORDER — ENOXAPARIN SODIUM 100 MG/ML
40 INJECTION SUBCUTANEOUS DAILY
Status: DISCONTINUED | OUTPATIENT
Start: 2023-02-18 | End: 2023-02-21 | Stop reason: HOSPADM

## 2023-02-18 RX ORDER — POTASSIUM CHLORIDE 20 MEQ/1
40 TABLET, EXTENDED RELEASE ORAL PRN
Status: DISCONTINUED | OUTPATIENT
Start: 2023-02-18 | End: 2023-02-21 | Stop reason: HOSPADM

## 2023-02-18 RX ORDER — SODIUM CHLORIDE 0.9 % (FLUSH) 0.9 %
5-40 SYRINGE (ML) INJECTION EVERY 12 HOURS SCHEDULED
Status: DISCONTINUED | OUTPATIENT
Start: 2023-02-18 | End: 2023-02-21 | Stop reason: HOSPADM

## 2023-02-18 RX ORDER — NITROGLYCERIN 0.4 MG/1
0.4 TABLET SUBLINGUAL ONCE
Status: COMPLETED | OUTPATIENT
Start: 2023-02-18 | End: 2023-02-18

## 2023-02-18 RX ORDER — SODIUM CHLORIDE 9 MG/ML
INJECTION, SOLUTION INTRAVENOUS PRN
Status: DISCONTINUED | OUTPATIENT
Start: 2023-02-18 | End: 2023-02-20 | Stop reason: SDUPTHER

## 2023-02-18 RX ORDER — ACETAMINOPHEN 325 MG/1
650 TABLET ORAL EVERY 6 HOURS PRN
Status: DISCONTINUED | OUTPATIENT
Start: 2023-02-18 | End: 2023-02-20 | Stop reason: DRUGHIGH

## 2023-02-18 RX ORDER — ACETAMINOPHEN 650 MG/1
650 SUPPOSITORY RECTAL EVERY 6 HOURS PRN
Status: DISCONTINUED | OUTPATIENT
Start: 2023-02-18 | End: 2023-02-20 | Stop reason: DRUGHIGH

## 2023-02-18 RX ORDER — MORPHINE SULFATE 2 MG/ML
2 INJECTION, SOLUTION INTRAMUSCULAR; INTRAVENOUS EVERY 4 HOURS PRN
Status: DISCONTINUED | OUTPATIENT
Start: 2023-02-18 | End: 2023-02-21 | Stop reason: HOSPADM

## 2023-02-18 RX ADMIN — Medication 0.4 MG: at 18:34

## 2023-02-18 RX ADMIN — ENOXAPARIN SODIUM 40 MG: 100 INJECTION SUBCUTANEOUS at 22:43

## 2023-02-18 RX ADMIN — SODIUM CHLORIDE, PRESERVATIVE FREE 10 ML: 5 INJECTION INTRAVENOUS at 22:40

## 2023-02-18 RX ADMIN — ASPIRIN 81 MG CHEWABLE TABLET 324 MG: 81 TABLET CHEWABLE at 19:40

## 2023-02-18 ASSESSMENT — LIFESTYLE VARIABLES: HOW OFTEN DO YOU HAVE A DRINK CONTAINING ALCOHOL: NEVER

## 2023-02-18 ASSESSMENT — ENCOUNTER SYMPTOMS: CHEST TIGHTNESS: 1

## 2023-02-18 ASSESSMENT — PAIN - FUNCTIONAL ASSESSMENT: PAIN_FUNCTIONAL_ASSESSMENT: 0-10

## 2023-02-18 ASSESSMENT — PAIN SCALES - GENERAL: PAINLEVEL_OUTOF10: 5

## 2023-02-18 NOTE — ED PROVIDER NOTES
EMERGENCY DEPARTMENT ENCOUNTER    Pt Name: Connor Ricks  MRN: [id number]  [place] 1965  Date of evaluation: 2/18/23  CHIEF COMPLAINT       Chief Complaint   Patient presents with    Chest Pain    Arm Pain     L side     HISTORY OF PRESENT ILLNESS   73-year-old female presents emergency room for intermittent episodes of chest discomfort over the last week. She has felt some fatigue. Today started having increased discomfort with tingling in the left hand and reported that when she was at the grocery store she became quite diaphoretic. Patient does have history of coronary artery disease with 2 stents placed in 2018. Patient reports her cardiologist is Dr. Alia Saenz. REVIEW OF SYSTEMS     Review of Systems   Constitutional:  Positive for diaphoresis and fatigue. Respiratory:  Positive for chest tightness. Neurological:  Positive for headaches. 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 History of urinary retention after anesthesia.      Hx of varicose veins     Hyperlipidemia     Insulin resistance 3/28/2017    Neurogenic bladder     NSTEMI (non-ST elevated myocardial infarction) ([facility] [address] 09/06/2018    mild    Retention of urine     Thyroid disease     Tobacco abuse 3/21/2018    Vitamin D deficiency 3/21/2018     Past Problem List  Patient Active Problem List   Diagnosis Code    Cloacal anomaly Q43.7    Tethered spinal cord ([facility] [address] Q06.8    Hypothyroidism E03.9    Muscle spasm M62.838    Cold sore B00.1 ADHD (attention deficit hyperactivity disorder) F90.9    Essential hypertension I10    Insulin resistance E88.81    Vitamin D deficiency E55.9    Tobacco abuse Z72.0    Chest pain R07.9    NSTEMI (non-ST elevated myocardial infarction) ([facility]) I21.4    Morbid obesity (HCC) E66.01    History of heart artery stent Z95.5    Arthritis M19.90    Post-operative pain G89.18    Contracture of joint of right foot M24.574    Pain in left foot M79.672    Hammertoe of second toe of right foot M20.41    Pain in right foot M79.671    Impingement syndrome of shoulder region M75.40     SURGICAL HISTORY       Past Surgical History:   Procedure Laterality Date    ANKLE FRACTURE SURGERY Left 2007    then hardware removed in 2011    ANUS SURGERY  1972    anal pullthrough    ARTHROPLASTY Right 11/08/2019    ARTHROPLASTY RIGHT 2ND DIGIT WITH WEIL OSTEOTOMY RIGHT performed by Ritchie Coleman DPM at [address] [place] [address]  09/06/2018    STENT X2  -  [facility] ([facility]) 3T Safe Immed. KNEE ARTHROSCOPY Left 1994, 2001, 2007    meniscal tear, lateral release, scraped arthritis    OVARY REMOVAL Left     mass on ovary    SPINE SURGERY  untethered with laminectomy 1994, 2003    VAGINA RECONSTRUCTION SURGERY  1986     CURRENT MEDICATIONS       Previous Medications    ASPIRIN 81 MG CHEWABLE TABLET    Take 1 tablet by mouth daily    ATOMOXETINE (STRATTERA) 100 MG CAPSULE    Take 1 capsule by mouth daily    BACLOFEN (LIORESAL) 10 MG TABLET    Take 10 mg by mouth as needed    ELASTIC BANDAGES & SUPPORTS (WRIST SPLINT/COCK-UP/LEFT M) MISC    Wear on left wrist nightly for carpal tunnel. GABAPENTIN (NEURONTIN) 300 MG CAPSULE        HYDROCODONE-ACETAMINOPHEN (NORCO) 5-325 MG PER TABLET    Take 1 tablet by mouth every 6 hours as needed for Pain.     LEVOTHYROXINE (SYNTHROID) 175 MCG TABLET    225 mcg    LISINOPRIL (PRINIVIL;ZESTRIL) 5 MG TABLET    Take 1 tablet by mouth daily    MELOXICAM (MOBIC) 15 MG TABLET    Take 1 tablet by mouth daily    METHOCARBAMOL (ROBAXIN) 500 MG TABLET    Take 500 mg by mouth in the morning and 500 mg at noon and 500 mg in the evening and 500 mg before bedtime. METOPROLOL TARTRATE (LOPRESSOR) 25 MG TABLET    Take 12.5 mg by mouth daily Takes 1/2 tab (=12.5mg) BID     MYRBETRIQ 50 MG TB24        NICOTINE (NICODERM CQ) 14 MG/24HR    Place 1 patch onto the skin every 24 hours for 14 days    NICOTINE (NICODERM CQ) 21 MG/24HR    Place 1 patch onto the skin every 24 hours    NICOTINE (NICODERM CQ) 7 MG/24HR    Place 1 patch onto the skin every 24 hours for 14 days    NITROGLYCERIN (NITROSTAT) 0.4 MG SL TABLET    Place 1 tablet under tongue upon chest pain, wait 5 minutes and may repeat up to 3 doses in 15 minutes. Do not crush or break. If no relief after 1 dose, call 911. TOLTERODINE (DETROL LA) 4 MG EXTENDED RELEASE CAPSULE        VALACYCLOVIR (VALTREX) 500 MG TABLET    Take 1 tablet by mouth every evening     ALLERGIES     is allergic to latex, cyclobenzaprine, beef-derived products, clonazepam, clonidine derivatives, and demerol hcl [meperidine]. FAMILY HISTORY     She indicated that her mother is alive. She indicated that her father is . She indicated that both of her sisters are alive. She indicated that two of her three brothers are alive. She indicated that the status of her maternal grandmother is unknown. She indicated that the status of her paternal grandmother is unknown. She indicated that the status of her neg hx is unknown.      SOCIAL HISTORY       Social History     Tobacco Use    Smoking status: Former     Packs/day: 0.50     Types: Cigarettes     Quit date: 2020     Years since quitting: 3.0    Smokeless tobacco: Never   Vaping Use    Vaping Use: Former   Substance Use Topics    Alcohol use: Yes     Comment: rare    Drug use: No     PHYSICAL EXAM     INITIAL VITALS: BP (!) 172/144   Pulse 81   Temp 97.7 °F (36.5 °C) (Oral)   Resp 16   Ht 5' 1\" (1.549 m)   Wt 215 lb (97.5 kg)   SpO2 94%   BMI 40.62 kg/m²    Physical Exam  Constitutional:       General: She is not in acute distress. Appearance: She is well-developed. HENT:      Head: Normocephalic. Eyes:      Pupils: Pupils are equal, round, and reactive to light. Cardiovascular:      Rate and Rhythm: Normal rate and regular rhythm. Heart sounds: Normal heart sounds. Pulmonary:      Effort: Pulmonary effort is normal. No respiratory distress. Breath sounds: Normal breath sounds. Abdominal:      General: Bowel sounds are normal.      Palpations: Abdomen is soft. Tenderness: There is no abdominal tenderness. Musculoskeletal:         General: Normal range of motion. Skin:     General: Skin is warm and dry. Neurological:      Mental Status: She is alert and oriented to person, place, and time. MEDICAL DECISION MAKING / ED COURSE:   Summary of Patient Presentation:        1)  Number and Complexity of Problems  Problem List This Visit: Chest pressure, lightheadedness, left hand tingling    Differential Diagnosis: Unstable angina, acute coronary syndrome    Diagnoses Considered but Do Not Suspect: Myocarditis, pericarditis, pneumonia, atypical chest pain    Pertinent Comorbid Conditions:  CAD    2)  Data Reviewed  My EKG interpretation:    Sinus rhythm ventricular rate 73  No significant ST or T wave changes    QTc 425    Decision Rules/Scores utilized: Heart score    HEART SCORE: 4  1  0  1  2  0      Imaging that is independently reviewed and interpreted by me as: No acute cardiopulmonary process    See more data below for the lab and radiology tests and orders. 3)  Treatment and Disposition    Patient repeat assessment: Patient did not have any acute changes here in the ED. Disposition discussion with patient/family: Patient is moderate risk given her age and comorbidities. With this discomfort episode of diaphoresis earlier today plan is for admission for cardiac evaluation. Patient is comfortable with and agreeable to plan. Case discussed with [facility] admitting plan is for admission. \"ED Course\" Notes From Epic Narrator:         CRITICAL CARE:       PROCEDURES:    Procedures      DATA FOR LAB AND RADIOLOGY TESTS ORDERED BELOW ARE REVIEWED BY THE ED CLINICIAN:    RADIOLOGY: All x-rays, CT, MRI, and formal ultrasound images (except ED bedside ultrasound) are read by the radiologist, see reports below, unless otherwise noted in MDM or here. Reports below are reviewed by myself. XR CHEST PORTABLE   Final Result   No acute abnormality. LABS: Lab orders shown below, the results are reviewed by myself, and all abnormals are listed below. Labs Reviewed   BASIC METABOLIC PANEL - Abnormal; Notable for the following components:       Result Value    Glucose 105 (*)     All other components within normal limits   CBC WITH AUTO DIFFERENTIAL   TROPONIN       Vitals Reviewed:    Vitals:    02/18/23 1752 02/18/23 1834 02/18/23 1836   BP: (!) 148/72 (!) 148/72 (!) 172/144   Pulse: 81 81    Resp: 16     Temp: 97.7 °F (36.5 °C)     TempSrc: Oral     SpO2: 96%  94%   Weight: 215 lb (97.5 kg)     Height: 5' 1\" (1.549 m)       MEDICATIONS GIVEN TO PATIENT THIS ENCOUNTER:  Orders Placed This Encounter   Medications    nitroGLYCERIN (NITROSTAT) SL tablet 0.4 mg    aspirin chewable tablet 324 mg     DISCHARGE PRESCRIPTIONS:  New Prescriptions    No medications on file     PHYSICIAN CONSULTS ORDERED THIS ENCOUNTER:  IP CONSULT TO INTERNAL MEDICINE  IP CONSULT TO CARDIOLOGY  FINAL IMPRESSION      1. Atypical chest pain          DISPOSITION/PLAN   DISPOSITION Decision To Admit 02/18/2023 07:18:16 PM      OUTPATIENT FOLLOW UP THE PATIENT:  No follow-up provider specified.     MD Amairani Cherry MD  02/18/23 2541

## 2023-02-19 LAB
ABSOLUTE EOS #: 0.14 K/UL (ref 0–0.44)
ABSOLUTE IMMATURE GRANULOCYTE: 0.02 K/UL (ref 0–0.3)
ABSOLUTE LYMPH #: 3.46 K/UL (ref 1.1–3.7)
ABSOLUTE MONO #: 0.42 K/UL (ref 0.1–1.2)
ANION GAP SERPL CALCULATED.3IONS-SCNC: 11 MMOL/L (ref 9–17)
BASOPHILS # BLD: 1 % (ref 0–2)
BASOPHILS ABSOLUTE: 0.04 K/UL (ref 0–0.2)
BUN SERPL-MCNC: 12 MG/DL (ref 6–20)
BUN/CREAT BLD: 18 (ref 9–20)
CALCIUM SERPL-MCNC: 9.4 MG/DL (ref 8.6–10.4)
CHLORIDE SERPL-SCNC: 105 MMOL/L (ref 98–107)
CO2 SERPL-SCNC: 23 MMOL/L (ref 20–31)
CREAT SERPL-MCNC: 0.68 MG/DL (ref 0.5–0.9)
EOSINOPHILS RELATIVE PERCENT: 2 % (ref 1–4)
GFR SERPL CREATININE-BSD FRML MDRD: >60 ML/MIN/1.73M2
GLUCOSE SERPL-MCNC: 104 MG/DL (ref 70–99)
HCT VFR BLD AUTO: 45.1 % (ref 36.3–47.1)
HGB BLD-MCNC: 14.8 G/DL (ref 11.9–15.1)
IMMATURE GRANULOCYTES: 0 %
INR PPP: 1
LYMPHOCYTES # BLD: 48 % (ref 24–43)
MCH RBC QN AUTO: 32.3 PG (ref 25.2–33.5)
MCHC RBC AUTO-ENTMCNC: 32.8 G/DL (ref 28.4–34.8)
MCV RBC AUTO: 98.5 FL (ref 82.6–102.9)
MONOCYTES # BLD: 6 % (ref 3–12)
NRBC AUTOMATED: 0 PER 100 WBC
PDW BLD-RTO: 13 % (ref 11.8–14.4)
PLATELET # BLD AUTO: 186 K/UL (ref 138–453)
PMV BLD AUTO: 10.4 FL (ref 8.1–13.5)
POTASSIUM SERPL-SCNC: 3.8 MMOL/L (ref 3.7–5.3)
PROTHROMBIN TIME: 13 SEC (ref 11.5–14.2)
RBC # BLD: 4.58 M/UL (ref 3.95–5.11)
SEG NEUTROPHILS: 43 % (ref 36–65)
SEGMENTED NEUTROPHILS ABSOLUTE COUNT: 3.03 K/UL (ref 1.5–8.1)
SODIUM SERPL-SCNC: 139 MMOL/L (ref 135–144)
TROPONIN I SERPL DL<=0.01 NG/ML-MCNC: 11 NG/L (ref 0–14)
TROPONIN I SERPL DL<=0.01 NG/ML-MCNC: 8 NG/L (ref 0–14)
TROPONIN I SERPL DL<=0.01 NG/ML-MCNC: 9 NG/L (ref 0–14)
WBC # BLD AUTO: 7.1 K/UL (ref 3.5–11.3)

## 2023-02-19 PROCEDURE — 6370000000 HC RX 637 (ALT 250 FOR IP): Performed by: NURSE PRACTITIONER

## 2023-02-19 PROCEDURE — 80048 BASIC METABOLIC PNL TOTAL CA: CPT

## 2023-02-19 PROCEDURE — 6370000000 HC RX 637 (ALT 250 FOR IP): Performed by: FAMILY MEDICINE

## 2023-02-19 PROCEDURE — 6360000002 HC RX W HCPCS: Performed by: NURSE PRACTITIONER

## 2023-02-19 PROCEDURE — 85025 COMPLETE CBC W/AUTO DIFF WBC: CPT

## 2023-02-19 PROCEDURE — 1200000000 HC SEMI PRIVATE

## 2023-02-19 PROCEDURE — 84484 ASSAY OF TROPONIN QUANT: CPT

## 2023-02-19 PROCEDURE — 85610 PROTHROMBIN TIME: CPT

## 2023-02-19 PROCEDURE — 2580000003 HC RX 258: Performed by: NURSE PRACTITIONER

## 2023-02-19 PROCEDURE — 36415 COLL VENOUS BLD VENIPUNCTURE: CPT

## 2023-02-19 RX ORDER — GABAPENTIN 300 MG/1
300 CAPSULE ORAL 2 TIMES DAILY
Status: DISCONTINUED | OUTPATIENT
Start: 2023-02-19 | End: 2023-02-21 | Stop reason: HOSPADM

## 2023-02-19 RX ORDER — NICOTINE 21 MG/24HR
1 PATCH, TRANSDERMAL 24 HOURS TRANSDERMAL DAILY
Status: DISCONTINUED | OUTPATIENT
Start: 2023-02-19 | End: 2023-02-21 | Stop reason: HOSPADM

## 2023-02-19 RX ORDER — ASPIRIN 81 MG/1
81 TABLET, CHEWABLE ORAL DAILY
Status: DISCONTINUED | OUTPATIENT
Start: 2023-02-19 | End: 2023-02-20 | Stop reason: CLARIF

## 2023-02-19 RX ORDER — LISINOPRIL 10 MG/1
10 TABLET ORAL 2 TIMES DAILY
Status: DISCONTINUED | OUTPATIENT
Start: 2023-02-19 | End: 2023-02-21 | Stop reason: HOSPADM

## 2023-02-19 RX ORDER — NITROGLYCERIN 0.4 MG/1
0.4 TABLET SUBLINGUAL EVERY 5 MIN PRN
Status: DISCONTINUED | OUTPATIENT
Start: 2023-02-19 | End: 2023-02-21 | Stop reason: HOSPADM

## 2023-02-19 RX ORDER — LISINOPRIL 10 MG/1
10 TABLET ORAL DAILY
Status: DISCONTINUED | OUTPATIENT
Start: 2023-02-19 | End: 2023-02-19

## 2023-02-19 RX ADMIN — SODIUM CHLORIDE, PRESERVATIVE FREE 10 ML: 5 INJECTION INTRAVENOUS at 21:04

## 2023-02-19 RX ADMIN — ASPIRIN 81 MG CHEWABLE TABLET 81 MG: 81 TABLET CHEWABLE at 18:13

## 2023-02-19 RX ADMIN — ACETAMINOPHEN 650 MG: 325 TABLET ORAL at 18:18

## 2023-02-19 RX ADMIN — LISINOPRIL 10 MG: 10 TABLET ORAL at 15:57

## 2023-02-19 RX ADMIN — SODIUM CHLORIDE, PRESERVATIVE FREE 10 ML: 5 INJECTION INTRAVENOUS at 09:00

## 2023-02-19 RX ADMIN — GABAPENTIN 300 MG: 300 CAPSULE ORAL at 21:04

## 2023-02-19 RX ADMIN — Medication 12.5 MG: at 15:57

## 2023-02-19 RX ADMIN — ACETAMINOPHEN 650 MG: 325 TABLET ORAL at 10:46

## 2023-02-19 RX ADMIN — LISINOPRIL 10 MG: 10 TABLET ORAL at 21:04

## 2023-02-19 RX ADMIN — ENOXAPARIN SODIUM 40 MG: 100 INJECTION SUBCUTANEOUS at 18:13

## 2023-02-19 RX ADMIN — Medication 12.5 MG: at 21:04

## 2023-02-19 ASSESSMENT — PAIN DESCRIPTION - LOCATION: LOCATION: HEAD

## 2023-02-19 ASSESSMENT — PAIN SCALES - GENERAL: PAINLEVEL_OUTOF10: 3

## 2023-02-19 NOTE — CARE COORDINATION
Case Management Assessment  Initial Evaluation    Date/Time of Evaluation: 2/19/2023 3:38 PM  Assessment Completed by: Mei Anderson    If patient is discharged prior to next notation, then this note serves as note for discharge by case management. Patient Name: Jocelyn Hahn                   YOB: 1965  Diagnosis: Chest pain [R07.9]  Atypical chest pain [R07.89]                   Date / Time: 2/18/2023  5:58 PM    Patient Admission Status: Inpatient   Readmission Risk (Low < 19, Mod (19-27), High > 27): Readmission Risk Score: 6.4    Current PCP: Kimmie Woodard MD  PCP verified by CM? Yes    Chart Reviewed: Yes      History Provided by: Patient  Patient Orientation: Alert and Oriented    Patient Cognition: Alert    Hospitalization in the last 30 days (Readmission):  No    If yes, Readmission Assessment in CM Navigator will be completed. Advance Directives:      Code Status: Full Code   Patient's Primary Decision Maker is: Legal Next of Kin      Discharge Planning:    Patient lives with: Alone (stays with her mom at her mom's house due to her alzheimers 3 days a week. Siblings share the other days) Type of Home: House  Primary Care Giver: Self  Patient Support Systems include: Family Members   Current Financial resources: Medicare  Current community resources: None  Current services prior to admission: None            Current DME:              Type of Home Care services:  None    ADLS  Prior functional level: Independent in ADLs/IADLs  Current functional level: Independent in ADLs/IADLs    PT AM-PAC:   /24  OT AM-PAC:   /24    Family can provide assistance at DC: Yes (sister can pick her up\)  Would you like Case Management to discuss the discharge plan with any other family members/significant others, and if so, who?  No  Plans to Return to Present Housing: Yes  Other Identified Issues/Barriers to RETURNING to current housing: yes  Potential Assistance needed at discharge: N/A Potential DME:    Patient expects to discharge to: [facility] for transportation at discharge:      Financial    Payor: Yeimi Norris 150 / Plan: Phill Armijo PPO / Product Type: *No Product type* /     Does insurance require precert for SNF: Yes    Potential assistance Purchasing Medications: No  Meds-to-Beds request:        [facility] [id number] - [address], [address] [place] [address]  [address] [place] [address]  Phone: [phone] Fax: [phone] [address] [facility] [phone] - f [phone]  [address] [address]  Phone: [phone] Fax: [phone]      Notes:    Factors facilitating achievement of predicted outcomes: Motivated, Cooperative, Pleasant, and Good insight into deficits    Barriers to discharge: none    Additional Case Management Notes:   Met with pt to review plan of care. She uses no DME. Independent. Cares for her mom 3 days a week and siblings share the rest of the week as her mom has alzheimers and can't be left alone. Heart cath for 2-20. Hx CAD with 2 stents. The Plan for Transition of Care is related to the following treatment goals of Chest pain [R07.9]  Atypical chest pain [B04.26]    IF APPLICABLE: The Patient and/or patient representative Jagdish Vergara and her family were provided with a choice of provider and agrees with the discharge plan. Freedom of choice list with basic dialogue that supports the patient's individualized plan of care/goals and shares the quality data associated with the providers was provided to:     Patient Representative Name:       The Patient and/or Patient Representative Agree with the Discharge Plan?       [facility]  Case Management Department  Ph:  Fax:

## 2023-02-19 NOTE — PROGRESS NOTES
Pt admitted to room 2005 from ER  Oriented to room and call light/tv controls. Bed in lowest position, wheels locked, 2/4 side rails up  Call light in reach, room free of clutter, adequate lighting provided.

## 2023-02-19 NOTE — ED NOTES
ED to inpatient nurses report     Chief Complaint   Patient presents with    Chest Pain    Arm Pain     L side      Present to ED from home  LOC: alert and orientated to name, place, date  Vital signs   Vitals:    02/18/23 1752 02/18/23 1834 02/18/23 1836   BP: (!) 148/72 (!) 148/72 (!) 172/144   Pulse: 81 81    Resp: 16     Temp: 97.7 °F (36.5 °C)     TempSrc: Oral     SpO2: 96%  94%   Weight: 215 lb (97.5 kg)     Height: 5' 1\" (1.549 m)        Oxygen Baseline room air    Current needs required room air   SEPSIS: no  [no] Lactate X 2 ordered (Yes or No)  [no] Antibiotics given (Yes or No)  [no] IV Fluids ordered (Yes or No)             [yes] IV completed (Yes or No)  [yes] Hourly Vital Signs (Validated)    LDAs:   Peripheral IV 02/18/23 Right Forearm (Active)   Site Assessment Clean, dry & intact 02/18/23 1832   Line Status Blood return noted;Brisk blood return;Flushed;Specimen collected;Normal saline locked 02/18/23 1832   Dressing Status New dressing applied;Clean, dry & intact 02/18/23 1832     Mobility: Independent  Fall Risk: no  Pending ED orders: none  Code Status: full  Consults: IP CONSULT TO INTERNAL MEDICINE  IP CONSULT TO CARDIOLOGY  [x]  Hospitalist  Completed  [] yes [] no Who:   []  Medicine  Completed  [] yes [] No Who:   []  Cardiology  Completed  [] yes [] No Who:   []  GI   Completed  [] yes [] No Who:   []  Neurology  Completed  [] yes [] No Who:   []  Nephrology Completed  [] yes [] No Who:    []  Vascular  Completed  [] yes [] No Who:   []  Ortho  Completed  [] yes [] No Who:     []  Surgery  Completed  [] yes [] No Who:    []  Urology  Completed  [] yes [] No Who:    []  CT Surgery Completed  [] yes [] No Who:   []  Podiatry  Completed  [] yes [] No Who:    []  Other    Completed  [] yes [] No Who:  Interventions: SL nitro; aspirin   Important Events:  Pt reports intermittent chest pain over the past week; reports some increased fatigue.  Pt is reporting pain in her left chest, pt reports right behind her heart by her shoulder blade. Pt reports while at the grocery store, she had some tingling in her left arm and became diaphoretic.         Electronically signed by Brent Clark RN on 2/18/2023 at 7:46 PM     Brent Clark RN  02/18/23 Beni 36, RN  02/18/23 Beni 36, RN  02/18/23 Beni 36, RN  02/18/23 1952

## 2023-02-19 NOTE — H&P
History & Physical  [facility]    Adult Hospitalist      Name: Radhames Cadena  MRN: [id number]     Acct: [de-identified]  Room: 2005/2005-01    Admit Date: 2/18/2023  5:58 PM  PCP: Francesca Montes De Oca MD    Primary Problem  Principal Problem:    Chest pain  Resolved Problems:    * No resolved hospital problems. *        Assesment:     Chest pain, unspecified  Left arm pain  Coronary artery disease, native vessel  Essential hypertension  Hypothyroidism  Prediabetes  Attention deficit hyperactivity disorder  Neuropathy  Chronic pain syndrome/low back pain  Morbid obesity with BMI 41        Plan:     Admit to [facility] with telemetry  Monitor vitals closely  Keep SPO2 above 90%  I's and O's  IV fluids  Pain control  Antiemetics as needed  Serial cardiac enzymes  EKG  Cardiology consulted in ED  Resume essential home medication  Add parameters  CBC, BMP  Incentive spirometry  Plan cardiac cath in a.m. per cardiology  DVT and GI prophylaxis. Chief Complaint:     Chief Complaint   Patient presents with    Chest Pain    Arm Pain     L side         History of Present Illness:      Radhames Cadena is a 56 y.o.  female who presents with Chest Pain and Arm Pain (L side)    Patient admitted through the emergency room where she presented with chest pain. Patient says symptoms have been going on for 5 days now where she felt chest discomfort over the posterior. Patient says she did not feel to be herself. She had been fatigued excessively and today started having paresthesias in her left hand. Patient says she tried to move around and became diaphoretic and nauseous associated with that while her posterior chest pain worsened. Patient says she got very concerned because these are similar symptoms that she had in the past when she had a cardiac issue    Patient had 2 stents placed in 2018. Denies headache, photophobia or diplopia. Denies neck pain. Denies vomiting, abdominal pain, diarrhea or constipation. Denies dysuria or joint swelling    I have personally reviewed the past medical history, past surgical history, medications, social history, and family history, and summarized in the note. Review of Systems:     All 10 point system is reviewed and negative otherwise mentioned in HPI. 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 History of urinary retention after anesthesia. Hx of varicose veins     Hyperlipidemia     Insulin resistance 3/28/2017    Neurogenic bladder     NSTEMI (non-ST elevated myocardial infarction) ([facility] [address] 09/06/2018    mild    Retention of urine     Thyroid disease     Tobacco abuse 3/21/2018    Vitamin D deficiency 3/21/2018        Past Surgical History:     Past Surgical History:   Procedure Laterality Date    ANKLE FRACTURE SURGERY Left 2007    then hardware removed in 2011    ANUS SURGERY  1972    anal pullthrough    ARTHROPLASTY Right 11/08/2019    ARTHROPLASTY RIGHT 2ND DIGIT WITH WEIL OSTEOTOMY RIGHT performed by Dunia Ortega DPM at [address] [place] [address]  09/06/2018    STENT X2  -  Xience Alpine ([facility]) 3T Safe Immed.     KNEE ARTHROSCOPY Left 1994, 2001, 2007    meniscal tear, lateral release, scraped arthritis    OVARY REMOVAL Left     mass on ovary    SPINE SURGERY  untethered with laminectomy 1994, 2003    [facility]        Medications Prior to Admission:       Prior to Admission medications    Medication Sig Start Date End Date Taking? Authorizing Provider   MYRBETRIQ 50 MG TB24  12/26/22   Historical Provider, MD   tolterodine (DETROL LA) 4 MG extended release capsule  6/15/22   Historical Provider, MD   methocarbamol (ROBAXIN) 500 MG tablet Take 500 mg by mouth in the morning and 500 mg at noon and 500 mg in the evening and 500 mg before bedtime. Patient not taking: Reported on 2/18/2023    Historical Provider, MD   baclofen (LIORESAL) 10 MG tablet Take 10 mg by mouth as needed    Historical Provider, MD   HYDROcodone-acetaminophen (NORCO) 5-325 MG per tablet Take 1 tablet by mouth every 6 hours as needed for Pain. Patient not taking: Reported on 2/18/2023    Historical Provider, MD   meloxicam (MOBIC) 15 MG tablet Take 1 tablet by mouth daily 12/13/21   Jaki Oar, DO   atomoxetine (STRATTERA) 100 MG capsule Take 1 capsule by mouth daily 11/10/21   Jaki Oar, DO   nicotine (NICODERM CQ) 21 MG/24HR Place 1 patch onto the skin every 24 hours 11/10/21 12/22/21  Jaki Oar, DO   nicotine (NICODERM CQ) 14 MG/24HR Place 1 patch onto the skin every 24 hours for 14 days 12/22/21 1/5/22  Jaki Oar, DO   nicotine (NICODERM CQ) 7 MG/24HR Place 1 patch onto the skin every 24 hours for 14 days 1/5/22 1/19/22  Jaki Oar, DO   gabapentin (NEURONTIN) 300 MG capsule  2/7/21   Historical Provider, MD   valACYclovir (VALTREX) 500 MG tablet Take 1 tablet by mouth every evening 5/17/21   Jaki Oar, DO   aspirin 81 MG chewable tablet Take 1 tablet by mouth daily 10/22/20   Jaki Oar, DO   levothyroxine (SYNTHROID) 175 MCG tablet 225 mcg 6/1/20   Historical Provider, MD   metoprolol tartrate (LOPRESSOR) 25 MG tablet Take 12.5 mg by mouth daily Takes 1/2 tab (=12.5mg) BID     Historical Provider, MD   Elastic Bandages & Supports (WRIST SPLINT/COCK-UP/LEFT M) MISC Wear on left wrist nightly for carpal tunnel.  7/3/19   Jaki Oar, DO   nitroGLYCERIN (NITROSTAT) 0.4 MG SL tablet Place 1 tablet under tongue upon chest pain, wait 5 minutes and may repeat up to 3 doses in 15 minutes. Do not crush or break. If no relief after 1 dose, call 911. 18   Iza Narvaez MD   lisinopril (PRINIVIL;ZESTRIL) 5 MG tablet Take 1 tablet by mouth daily  Patient taking differently: Take 10 mg by mouth daily 18   Iza Narvaez MD        Allergies:       Latex, Cyclobenzaprine, Beef-derived products, Clonazepam, Clonidine derivatives, and Demerol hcl [meperidine]    Social History:     Tobacco:    reports that she quit smoking about 3 years ago. Her smoking use included cigarettes. She smoked an average of .5 packs per day. She has never used smokeless tobacco.  Alcohol:      reports current alcohol use. Drug Use:  reports current drug use. Drug: Marijuana Lianneaustin Che).     Family History:     Family History   Problem Relation Age of Onset    Other Mother         tremors     Arthritis Mother     Heart Disease Father     Diabetes Father     Cancer Father         prostate, skin and kidney     Arthritis Father     Kidney Disease Father     Other Sister     Other Brother         seziure     Other Sister         MS     Asthma Brother     Diabetes Maternal Grandmother     Diabetes Paternal Grandmother     Mental Retardation Neg Hx          Physical Exam:     Vitals:  BP (!) 153/76   Pulse 72   Temp 97.9 °F (36.6 °C) (Oral)   Resp 17   Ht 5' 1\" (1.549 m)   Wt 217 lb 2.5 oz (98.5 kg)   SpO2 95%   BMI 41.03 kg/m²   Temp (24hrs), Av.8 °F (36.6 °C), Min:97.5 °F (36.4 °C), Max:98.2 °F (36.8 °C)          General appearance - alert, well appearing, and in no acute distress  Mental status - oriented to person, place, and time with normal affect  Head - normocephalic and atraumatic  Eyes - pupils equal and reactive, extraocular eye movements intact, conjunctiva clear  Ears - hearing appears to be intact  Nose - no drainage noted  Mouth - mucous membranes moist  Neck - supple, no carotid bruits, thyroid not palpable  Chest - clear to auscultation, normal effort  Heart - normal rate, regular rhythm, no murmur  Abdomen - soft, obese, nontender, nondistended, bowel sounds present all four quadrants, no masses, hepatomegaly or splenomegaly  Neurological - normal speech, no focal findings or movement disorder noted, cranial nerves II through XII grossly intact  Extremities - peripheral pulses palpable, no pedal edema or calf pain with palpation  Skin - no gross lesions, rashes, or induration noted        Data:     Labs:    Hematology:  Recent Labs     02/18/23 1835 02/19/23  0607   WBC 7.6 7.1   RBC 4.40 4.58   HGB 14.2 14.8   HCT 43.5 45.1   MCV 98.9 98.5   MCH 32.3 32.3   MCHC 32.6 32.8   RDW 13.0 13.0    186   MPV 9.9 10.4   INR  --  1.0     Chemistry:  Recent Labs     02/18/23 1835 02/19/23  0607 02/19/23  1521    139  --    K 4.3 3.8  --     105  --    CO2 23 23  --    GLUCOSE 105* 104*  --    BUN 13 12  --    CREATININE 0.74 0.68  --    ANIONGAP 11 11  --    LABGLOM >60 >60  --    CALCIUM 9.3 9.4  --    TROPHS 9 11 9     No results for input(s): PROT, LABALBU, LABA1C, W0YDMVL, M9KXQBA, FT4, TSH, AST, ALT, LDH, GGT, ALKPHOS, LABGGT, BILITOT, BILIDIR, AMMONIA, AMYLASE, LIPASE, LACTATE, CHOL, HDL, LDLCHOLESTEROL, CHOLHDLRATIO, TRIG, VLDL, [id number], PHENYTOIN, PHENYF, URICACID, POCGLU in the last 72 hours. Lab Results   Component Value Date    INR 1.0 02/19/2023    INR 1.0 11/13/2021    INR 0.9 09/30/2015    PROTIME 13.0 02/19/2023    PROTIME 12.7 11/13/2021    PROTIME 9.6 (L) 09/30/2015       No results found for: SPECIAL  No results found for: CULTURE    No results found for: POCPH, PHART, PH, POCPCO2, QXZ8DXJ, PCO2, POCPO2, PO2ART, PO2, POCHCO3, ZQT6MWK, HCO3, NBEA, PBEA, BEART, BE, THGBART, THB, OLL7UBG, RFSP8JUX, I7JVJSPI, O2SAT, FIO2    Radiology:    XR CHEST PORTABLE    Result Date: 2/18/2023  No acute abnormality.          All radiological studies reviewed                Code Status:  Full Code    Electronically signed by Kolby Delarosa MD on 2/19/2023 at 3:48 PM     Copy sent to Dr. Dalton Noyola MD    This note was created with the assistance of a speech-recognition program.  Although the intention is to generate a document that actually reflects the content of the visit, no guarantees can be provided that every mistake has been identified and corrected by editing. Note was updated later by me after  physical examination and  completion of the assessment.

## 2023-02-20 LAB
ABSOLUTE EOS #: 0.12 K/UL (ref 0–0.44)
ABSOLUTE IMMATURE GRANULOCYTE: 0.02 K/UL (ref 0–0.3)
ABSOLUTE LYMPH #: 3.01 K/UL (ref 1.1–3.7)
ABSOLUTE MONO #: 0.45 K/UL (ref 0.1–1.2)
ANION GAP SERPL CALCULATED.3IONS-SCNC: 7 MMOL/L (ref 9–17)
BASOPHILS # BLD: 1 % (ref 0–2)
BASOPHILS ABSOLUTE: 0.03 K/UL (ref 0–0.2)
BUN SERPL-MCNC: 13 MG/DL (ref 6–20)
BUN/CREAT BLD: 19 (ref 9–20)
CALCIUM SERPL-MCNC: 8.8 MG/DL (ref 8.6–10.4)
CHLORIDE SERPL-SCNC: 109 MMOL/L (ref 98–107)
CO2 SERPL-SCNC: 23 MMOL/L (ref 20–31)
CREAT SERPL-MCNC: 0.69 MG/DL (ref 0.5–0.9)
EKG ATRIAL RATE: 57 BPM
EKG ATRIAL RATE: 69 BPM
EKG P AXIS: 70 DEGREES
EKG P AXIS: 76 DEGREES
EKG P-R INTERVAL: 164 MS
EKG P-R INTERVAL: 176 MS
EKG Q-T INTERVAL: 382 MS
EKG Q-T INTERVAL: 396 MS
EKG QRS DURATION: 86 MS
EKG QRS DURATION: 90 MS
EKG QTC CALCULATION (BAZETT): 385 MS
EKG QTC CALCULATION (BAZETT): 409 MS
EKG R AXIS: 39 DEGREES
EKG R AXIS: 48 DEGREES
EKG T AXIS: 56 DEGREES
EKG T AXIS: 70 DEGREES
EKG VENTRICULAR RATE: 57 BPM
EKG VENTRICULAR RATE: 69 BPM
EOSINOPHILS RELATIVE PERCENT: 2 % (ref 1–4)
GFR SERPL CREATININE-BSD FRML MDRD: >60 ML/MIN/1.73M2
GLUCOSE SERPL-MCNC: 123 MG/DL (ref 70–99)
HCT VFR BLD AUTO: 43.7 % (ref 36.3–47.1)
HGB BLD-MCNC: 13.8 G/DL (ref 11.9–15.1)
IMMATURE GRANULOCYTES: 0 %
LV EF: 60 %
LVEF MODALITY: NORMAL
LYMPHOCYTES # BLD: 54 % (ref 24–43)
MCH RBC QN AUTO: 31.9 PG (ref 25.2–33.5)
MCHC RBC AUTO-ENTMCNC: 31.6 G/DL (ref 28.4–34.8)
MCV RBC AUTO: 100.9 FL (ref 82.6–102.9)
MONOCYTES # BLD: 8 % (ref 3–12)
NRBC AUTOMATED: 0 PER 100 WBC
PDW BLD-RTO: 13.1 % (ref 11.8–14.4)
PLATELET # BLD AUTO: 163 K/UL (ref 138–453)
PMV BLD AUTO: 10.3 FL (ref 8.1–13.5)
POTASSIUM SERPL-SCNC: 4.3 MMOL/L (ref 3.7–5.3)
RBC # BLD: 4.33 M/UL (ref 3.95–5.11)
SEG NEUTROPHILS: 35 % (ref 36–65)
SEGMENTED NEUTROPHILS ABSOLUTE COUNT: 1.91 K/UL (ref 1.5–8.1)
SODIUM SERPL-SCNC: 139 MMOL/L (ref 135–144)
WBC # BLD AUTO: 5.5 K/UL (ref 3.5–11.3)

## 2023-02-20 PROCEDURE — 2500000003 HC RX 250 WO HCPCS

## 2023-02-20 PROCEDURE — 6360000002 HC RX W HCPCS: Performed by: NURSE PRACTITIONER

## 2023-02-20 PROCEDURE — 6370000000 HC RX 637 (ALT 250 FOR IP): Performed by: FAMILY MEDICINE

## 2023-02-20 PROCEDURE — 93005 ELECTROCARDIOGRAM TRACING: CPT | Performed by: INTERNAL MEDICINE

## 2023-02-20 PROCEDURE — 2580000003 HC RX 258

## 2023-02-20 PROCEDURE — 4A023N7 MEASUREMENT OF CARDIAC SAMPLING AND PRESSURE, LEFT HEART, PERCUTANEOUS APPROACH: ICD-10-PCS | Performed by: INTERNAL MEDICINE

## 2023-02-20 PROCEDURE — 6370000000 HC RX 637 (ALT 250 FOR IP): Performed by: INTERNAL MEDICINE

## 2023-02-20 PROCEDURE — C1894 INTRO/SHEATH, NON-LASER: HCPCS

## 2023-02-20 PROCEDURE — C1874 STENT, COATED/COV W/DEL SYS: HCPCS

## 2023-02-20 PROCEDURE — 36415 COLL VENOUS BLD VENIPUNCTURE: CPT

## 2023-02-20 PROCEDURE — 2580000003 HC RX 258: Performed by: NURSE PRACTITIONER

## 2023-02-20 PROCEDURE — B2111ZZ FLUOROSCOPY OF MULTIPLE CORONARY ARTERIES USING LOW OSMOLAR CONTRAST: ICD-10-PCS | Performed by: INTERNAL MEDICINE

## 2023-02-20 PROCEDURE — 80048 BASIC METABOLIC PNL TOTAL CA: CPT

## 2023-02-20 PROCEDURE — 1200000000 HC SEMI PRIVATE

## 2023-02-20 PROCEDURE — 92928 PRQ TCAT PLMT NTRAC ST 1 LES: CPT

## 2023-02-20 PROCEDURE — 93458 L HRT ARTERY/VENTRICLE ANGIO: CPT

## 2023-02-20 PROCEDURE — C1769 GUIDE WIRE: HCPCS

## 2023-02-20 PROCEDURE — 6360000002 HC RX W HCPCS

## 2023-02-20 PROCEDURE — C1725 CATH, TRANSLUMIN NON-LASER: HCPCS

## 2023-02-20 PROCEDURE — 027034Z DILATION OF CORONARY ARTERY, ONE ARTERY WITH DRUG-ELUTING INTRALUMINAL DEVICE, PERCUTANEOUS APPROACH: ICD-10-PCS | Performed by: INTERNAL MEDICINE

## 2023-02-20 PROCEDURE — 6370000000 HC RX 637 (ALT 250 FOR IP)

## 2023-02-20 PROCEDURE — 6370000000 HC RX 637 (ALT 250 FOR IP): Performed by: NURSE PRACTITIONER

## 2023-02-20 PROCEDURE — 85025 COMPLETE CBC W/AUTO DIFF WBC: CPT

## 2023-02-20 PROCEDURE — 2709999900 HC NON-CHARGEABLE SUPPLY

## 2023-02-20 PROCEDURE — 6360000004 HC RX CONTRAST MEDICATION

## 2023-02-20 PROCEDURE — C1887 CATHETER, GUIDING: HCPCS

## 2023-02-20 PROCEDURE — 2580000003 HC RX 258: Performed by: INTERNAL MEDICINE

## 2023-02-20 PROCEDURE — B2151ZZ FLUOROSCOPY OF LEFT HEART USING LOW OSMOLAR CONTRAST: ICD-10-PCS | Performed by: INTERNAL MEDICINE

## 2023-02-20 RX ORDER — ONDANSETRON 2 MG/ML
4 INJECTION INTRAMUSCULAR; INTRAVENOUS EVERY 6 HOURS PRN
Status: DISCONTINUED | OUTPATIENT
Start: 2023-02-20 | End: 2023-02-20 | Stop reason: SDUPTHER

## 2023-02-20 RX ORDER — SODIUM CHLORIDE 0.9 % (FLUSH) 0.9 %
5-40 SYRINGE (ML) INJECTION EVERY 12 HOURS SCHEDULED
Status: DISCONTINUED | OUTPATIENT
Start: 2023-02-20 | End: 2023-02-21 | Stop reason: HOSPADM

## 2023-02-20 RX ORDER — ASPIRIN 81 MG/1
81 TABLET ORAL DAILY
Status: DISCONTINUED | OUTPATIENT
Start: 2023-02-21 | End: 2023-02-21 | Stop reason: HOSPADM

## 2023-02-20 RX ORDER — ROSUVASTATIN CALCIUM 40 MG/1
40 TABLET, COATED ORAL NIGHTLY
Status: DISCONTINUED | OUTPATIENT
Start: 2023-02-20 | End: 2023-02-21 | Stop reason: HOSPADM

## 2023-02-20 RX ORDER — SODIUM CHLORIDE 0.9 % (FLUSH) 0.9 %
5-40 SYRINGE (ML) INJECTION PRN
Status: DISCONTINUED | OUTPATIENT
Start: 2023-02-20 | End: 2023-02-21 | Stop reason: HOSPADM

## 2023-02-20 RX ORDER — ACETAMINOPHEN 325 MG/1
650 TABLET ORAL EVERY 4 HOURS PRN
Status: DISCONTINUED | OUTPATIENT
Start: 2023-02-20 | End: 2023-02-21 | Stop reason: HOSPADM

## 2023-02-20 RX ORDER — SODIUM CHLORIDE 9 MG/ML
INJECTION, SOLUTION INTRAVENOUS CONTINUOUS
Status: DISCONTINUED | OUTPATIENT
Start: 2023-02-20 | End: 2023-02-21

## 2023-02-20 RX ORDER — SODIUM CHLORIDE 9 MG/ML
INJECTION, SOLUTION INTRAVENOUS PRN
Status: DISCONTINUED | OUTPATIENT
Start: 2023-02-20 | End: 2023-02-21 | Stop reason: HOSPADM

## 2023-02-20 RX ADMIN — ACETAMINOPHEN 650 MG: 325 TABLET ORAL at 19:49

## 2023-02-20 RX ADMIN — LISINOPRIL 10 MG: 10 TABLET ORAL at 10:09

## 2023-02-20 RX ADMIN — SODIUM CHLORIDE, PRESERVATIVE FREE 10 ML: 5 INJECTION INTRAVENOUS at 10:15

## 2023-02-20 RX ADMIN — LEVOTHYROXINE SODIUM 225 MCG: 0.2 TABLET ORAL at 05:19

## 2023-02-20 RX ADMIN — MORPHINE SULFATE 2 MG: 2 INJECTION, SOLUTION INTRAMUSCULAR; INTRAVENOUS at 21:16

## 2023-02-20 RX ADMIN — ROSUVASTATIN 40 MG: 40 TABLET, FILM COATED ORAL at 21:16

## 2023-02-20 RX ADMIN — GABAPENTIN 300 MG: 300 CAPSULE ORAL at 10:09

## 2023-02-20 RX ADMIN — GABAPENTIN 300 MG: 300 CAPSULE ORAL at 21:16

## 2023-02-20 RX ADMIN — ASPIRIN 81 MG CHEWABLE TABLET 81 MG: 81 TABLET CHEWABLE at 10:09

## 2023-02-20 RX ADMIN — LISINOPRIL 10 MG: 10 TABLET ORAL at 21:16

## 2023-02-20 RX ADMIN — SODIUM CHLORIDE: 9 INJECTION, SOLUTION INTRAVENOUS at 17:42

## 2023-02-20 RX ADMIN — Medication 12.5 MG: at 21:16

## 2023-02-20 RX ADMIN — TICAGRELOR 90 MG: 90 TABLET ORAL at 21:15

## 2023-02-20 ASSESSMENT — PAIN SCALES - GENERAL
PAINLEVEL_OUTOF10: 10
PAINLEVEL_OUTOF10: 10

## 2023-02-20 ASSESSMENT — PAIN DESCRIPTION - LOCATION
LOCATION: HEAD
LOCATION: HEAD

## 2023-02-20 NOTE — PROGRESS NOTES
[facility]  Inpatient Cardiology Consult             Date:   2/19/23  Patient name: Dwaine Quinonez  Date of admission:  2/18/2023  5:58 PM  MRN:   [id number]  YOB: 1965      Reason for Admission:  Unstable angina    CHIEF COMPLAINT:  Chest pain     History Obtained From:  Patient and medical record    HISTORY OF PRESENT ILLNESS:    The patient is a 56 y.o woman with h/o HTN, HLP and CAD, s/p NSTEMI with PCI to the LAD in 2018, admitted for recent chest pain. Over the past week she has had periods of substernal and left-sided chest pressure and burning sensation, sometimes radiating to her left arm. She had some diaphoresis with an episode yesterday in the grocery store. In the ER her EKG showed no acute changes, and serial troponins are 9 and 11 ng/L. She has had no chest discomfort since admission. Past Medical History:   has a past medical history of ADHD, Arthritis, Bowel and bladder incontinence, CAD (coronary artery disease), Cardiac murmur, Cloacal anomaly, Essential hypertension, H/O cold sores, History of heart artery stent, History of kidney stones, History of urinary self-catheterization, Hx of varicose veins, Hyperlipidemia, Insulin resistance, Neurogenic bladder, NSTEMI (non-ST elevated myocardial infarction) ([facility] [address] Retention of urine, Thyroid disease, Tobacco abuse, and Vitamin D deficiency. Past Surgical History:   has a past surgical history that includes Ankle fracture surgery (Left, 2007); Knee arthroscopy (Left, 1994, 2001, 2007); Anus surgery (1972); Vagina reconstruction surgery (1986); Spine surgery (untethered with laminectomy 1994, 2003); Coronary angioplasty with stent (09/06/2018); Ovary removal (Left); and arthroplasty (Right, 11/08/2019). Home Medications:    Prior to Admission medications    Medication Sig Start Date End Date Taking?  Authorizing Provider   MYRBETRIQ 50 MG TB24  12/26/22   Historical Provider, MD   tolterodine ([place]ucas Goldsmith) 4 MG extended release capsule  6/15/22   Historical Provider, MD   methocarbamol (ROBAXIN) 500 MG tablet Take 500 mg by mouth in the morning and 500 mg at noon and 500 mg in the evening and 500 mg before bedtime. Patient not taking: Reported on 2/18/2023    Historical Provider, MD   baclofen (LIORESAL) 10 MG tablet Take 10 mg by mouth as needed    Historical Provider, MD   HYDROcodone-acetaminophen (NORCO) 5-325 MG per tablet Take 1 tablet by mouth every 6 hours as needed for Pain. Patient not taking: Reported on 2/18/2023    Historical Provider, MD   meloxicam (MOBIC) 15 MG tablet Take 1 tablet by mouth daily 12/13/21   Rhianna Reasons, DO   atomoxetine (STRATTERA) 100 MG capsule Take 1 capsule by mouth daily 11/10/21   Rhianna Reasons, DO   nicotine (NICODERM CQ) 21 MG/24HR Place 1 patch onto the skin every 24 hours 11/10/21 12/22/21  Rhianna Reasons, DO   nicotine (NICODERM CQ) 14 MG/24HR Place 1 patch onto the skin every 24 hours for 14 days 12/22/21 1/5/22  Rhianna Reasons, DO   nicotine (NICODERM CQ) 7 MG/24HR Place 1 patch onto the skin every 24 hours for 14 days 1/5/22 1/19/22  Rhianna Reasons, DO   gabapentin (NEURONTIN) 300 MG capsule  2/7/21   Historical Provider, MD   valACYclovir (VALTREX) 500 MG tablet Take 1 tablet by mouth every evening 5/17/21   Rhianna Reasons, DO   aspirin 81 MG chewable tablet Take 1 tablet by mouth daily 10/22/20   Rhianna Reasons, DO   levothyroxine (SYNTHROID) 175 MCG tablet 225 mcg 6/1/20   Historical Provider, MD   metoprolol tartrate (LOPRESSOR) 25 MG tablet Take 12.5 mg by mouth 2 times daily Takes 1/2 tab (=12.5mg) BID    Historical Provider, MD   Elastic Bandages & Supports (WRIST SPLINT/COCK-UP/LEFT M) MISC Wear on left wrist nightly for carpal tunnel. 7/3/19   Rhianna Reasons, DO   nitroGLYCERIN (NITROSTAT) 0.4 MG SL tablet Place 1 tablet under tongue upon chest pain, wait 5 minutes and may repeat up to 3 doses in 15 minutes. Do not crush or break.  If no relief after 1 dose, call 911. 9/8/18   Eliane Garcia MD   lisinopril (PRINIVIL;ZESTRIL) 5 MG tablet Take 1 tablet by mouth daily  Patient taking differently: Take 10 mg by mouth 2 times daily 9/9/18   Eliane Garcia MD       Allergies:  Latex, Cyclobenzaprine, Beef-derived products, Clonazepam, Clonidine derivatives, and Demerol hcl [meperidine]    Social History:   reports that she quit smoking about 3 years ago. Her smoking use included cigarettes. She smoked an average of .5 packs per day. She has never used smokeless tobacco. She reports current alcohol use. She reports current drug use. Drug: Marijuana (Weed).     Family History:   Positive for early CAD    REVIEW OF SYSTEMS:    Constitutional: there has been no unanticipated weight loss. There's been No change in energy level, No change in activity level.     Eyes: No visual changes or diplopia. No scleral icterus.  ENT: No Headaches, hearing loss or vertigo. No mouth sores or sore throat.  Cardiovascular: No problem  Respiratory: No previous reported problems  Gastrointestinal: No abdominal pain, appetite loss, blood in stools. No change in bowel or bladder habits.  Genitourinary: No dysuria, trouble voiding, or hematuria.  Musculoskeletal:  No gait disturbance, No weakness or joint complaints.  Integumentary: No rash or pruritis.  Neurological: No headache, diplopia, change in muscle strength, numbness or tingling. No change in gait, balance, coordination, mood, affect, memory, mentation, behavior.  Psychiatric: No anxiety, or depression.  Endocrine: No temperature intolerance. No excessive thirst, fluid intake, or urination. No tremor.  Hematologic/Lymphatic: No abnormal bruising or bleeding, blood clots or swollen lymph nodes.  Allergic/Immunologic: No nasal congestion or hives.    PHYSICAL EXAM:    Physical Examination:    BP (!) 94/46   Pulse 51   Temp 98.1 °F (36.7 °C) (Oral)   Resp 16   Ht 5' 1\" (1.549 m)   Wt 217 lb 2.5 oz (98.5 kg)   SpO2 94%   BMI 41.03 kg/m²    Constitutional and General Appearance: alert, cooperative, no distress and appears stated age  [de-identified]: PERRL, no cervical lymphadenopathy. No masses palpable. Normal oral mucosa  Respiratory:  Normal excursion and expansion without use of accessory muscles  Resp Auscultation: Good respiratory effort. No for increased work of breathing. On auscultation: clear to auscultation bilaterally  Cardiovascular: The apical impulse is not displaced  Heart tones are crisp and normal. regular S1 and S2. Murmurs:  None  Jugular venous pulsation Normal  The carotid upstroke is normal in amplitude and contour without delay or bruit  Peripheral pulses are symmetrical and full   Abdomen:  No masses or tenderness  Bowel sounds present  Extremities:   No Cyanosis or Clubbing   Lower extremity edema: Yes   Skin: Warm and dry  Neurological:  Alert and oriented. Moves all extremities well  No abnormalities of mood, affect, memory, mentation, or behavior are noted    DATA:    Diagnostics:      EKG:   Sinus rhythm; q waves in leads II, III, aVF; non-specific T wave abnormality    LEXISCAN STRESS TEST ( 9/16/22)    Moderate inferior ischemia  Normal segmental LV wall motion: LVEF 71%    Labs:     CBC:   Recent Labs     02/18/23  1835 02/19/23  0607   WBC 7.6 7.1   HGB 14.2 14.8   HCT 43.5 45.1    186     BMP:   Recent Labs     02/18/23  1835 02/19/23  0607    139   K 4.3 3.8   CO2 23 23   BUN 13 12   CREATININE 0.74 0.68   LABGLOM >60 >60   GLUCOSE 105* 104*     BNP: No results for input(s): BNP in the last 72 hours. PT/INR:   Recent Labs     02/19/23  0607   PROTIME 13.0   INR 1.0     APTT:No results for input(s): APTT in the last 72 hours. CARDIAC ENZYMES:No results for input(s): CKTOTAL, CKMB, CKMBINDEX, TROPONINI in the last 72 hours.   FASTING LIPID PANEL:  Lab Results   Component Value Date/Time    HDL 55 11/13/2021 08:12 AM    LDLCALC 134 08/04/2014 04:49 PM    TRIG 180 09/07/2018 04:10 AM     LIVER PROFILE:No results for input(s): AST, ALT, LABALBU in the last 72 hours. IMPRESSION:    Unstable angina  CAD with h/o PCI to the LAD in 2018  Essential HTN  HLP  Hypothyroidism    Patient Active Problem List   Diagnosis    Cloacal anomaly    Tethered spinal cord (HCC)    Hypothyroidism    Muscle spasm    Cold sore    ADHD (attention deficit hyperactivity disorder)    Essential hypertension    Insulin resistance    Vitamin D deficiency    Tobacco abuse    Chest pain    NSTEMI (non-ST elevated myocardial infarction) ([facility] [address]    Morbid obesity ([facility] [address]    History of heart artery stent    Arthritis    Post-operative pain    Contracture of joint of right foot    Pain in left foot    Hammertoe of second toe of right foot    Pain in right foot    Impingement syndrome of shoulder region       RECOMMENDATIONS:  Continue ASA, metoprolol and lisinopril  Cardiac catheterization with possible PCI tomorrow. Discussed with patient and nursing.     Electronically signed by Carolina Sanchez MD on 2/20/2023 at 5:21 AM.  [facility] cardiology Consultant

## 2023-02-20 NOTE — PROGRESS NOTES
[facility]  Inpatient Cardiology Consult             Date:   2/19/23  Patient name: Donavan Hung  Date of admission:  2/18/2023  5:58 PM  MRN:   [id number]  YOB: 1965      Reason for Admission:  Unstable angina    CHIEF COMPLAINT:  Chest pain     History Obtained From:  Patient and medical record    HISTORY OF PRESENT ILLNESS:    The patient is a 56 y.o woman with h/o HTN, HLP and CAD, s/p NSTEMI with PCI to the LAD in 2018, admitted for recent chest pain. Over the past week she has had periods of substernal and left-sided chest pressure and burning sensation, sometimes radiating to her left arm. She had some diaphoresis with an episode yesterday in the grocery store. In the ER her EKG showed no acute changes, and serial troponins are 9 and 11 ng/L. She has had no chest discomfort since admission. Pt denies any CP or sob today. Pt very anxious and crying in room. She is concerned her heart is more damaged due to waiting. Past Medical History:   has a past medical history of ADHD, Arthritis, Bowel and bladder incontinence, CAD (coronary artery disease), Cardiac murmur, Cloacal anomaly, Essential hypertension, H/O cold sores, History of heart artery stent, History of kidney stones, History of urinary self-catheterization, Hx of varicose veins, Hyperlipidemia, Insulin resistance, Neurogenic bladder, NSTEMI (non-ST elevated myocardial infarction) ([facility] [address] Retention of urine, Thyroid disease, Tobacco abuse, and Vitamin D deficiency. Past Surgical History:   has a past surgical history that includes Ankle fracture surgery (Left, 2007); Knee arthroscopy (Left, 1994, 2001, 2007); Anus surgery (1972); Vagina reconstruction surgery (1986); Spine surgery (untethered with laminectomy 1994, 2003); Coronary angioplasty with stent (09/06/2018); Ovary removal (Left); and arthroplasty (Right, 11/08/2019).      Home Medications:    Prior to Admission medications    Medication Sig Start Date End Date Taking? Authorizing Provider   MYRBETRIQ 50 MG TB24  12/26/22   Historical Provider, MD   tolterodine (DETROL LA) 4 MG extended release capsule  6/15/22   Historical Provider, MD   methocarbamol (ROBAXIN) 500 MG tablet Take 500 mg by mouth in the morning and 500 mg at noon and 500 mg in the evening and 500 mg before bedtime. Patient not taking: Reported on 2/18/2023    Historical Provider, MD   baclofen (LIORESAL) 10 MG tablet Take 10 mg by mouth as needed    Historical Provider, MD   HYDROcodone-acetaminophen (NORCO) 5-325 MG per tablet Take 1 tablet by mouth every 6 hours as needed for Pain. Patient not taking: Reported on 2/18/2023    Historical Provider, MD   meloxicam (MOBIC) 15 MG tablet Take 1 tablet by mouth daily 12/13/21   Lavella Brisk, DO   atomoxetine (STRATTERA) 100 MG capsule Take 1 capsule by mouth daily 11/10/21   Purnimaella Brisk, DO   nicotine (NICODERM CQ) 21 MG/24HR Place 1 patch onto the skin every 24 hours 11/10/21 12/22/21  Lavella Brisk, DO   nicotine (NICODERM CQ) 14 MG/24HR Place 1 patch onto the skin every 24 hours for 14 days 12/22/21 1/5/22  Lavella Brisk, DO   nicotine (NICODERM CQ) 7 MG/24HR Place 1 patch onto the skin every 24 hours for 14 days 1/5/22 1/19/22  Lavella Brisk, DO   gabapentin (NEURONTIN) 300 MG capsule  2/7/21   Historical Provider, MD   valACYclovir (VALTREX) 500 MG tablet Take 1 tablet by mouth every evening 5/17/21   Lavella Brisk, DO   aspirin 81 MG chewable tablet Take 1 tablet by mouth daily 10/22/20   Purnimaella Brisk, DO   levothyroxine (SYNTHROID) 175 MCG tablet 225 mcg 6/1/20   Historical Provider, MD   metoprolol tartrate (LOPRESSOR) 25 MG tablet Take 12.5 mg by mouth 2 times daily Takes 1/2 tab (=12.5mg) BID    Historical Provider, MD   Elastic Bandages & Supports (WRIST SPLINT/COCK-UP/LEFT M) MISC Wear on left wrist nightly for carpal tunnel.  7/3/19   Lavella Brisk, DO   nitroGLYCERIN (NITROSTAT) 0.4 MG SL tablet Place 1 tablet under tongue upon chest pain, wait 5 minutes and may repeat up to 3 doses in 15 minutes. Do not crush or break. If no relief after 1 dose, call 911. 9/8/18   Endy Ace MD   lisinopril (PRINIVIL;ZESTRIL) 5 MG tablet Take 1 tablet by mouth daily  Patient taking differently: Take 10 mg by mouth 2 times daily 9/9/18   Endy Aec MD       Allergies:  Latex, Cyclobenzaprine, Beef-derived products, Clonazepam, Clonidine derivatives, and Demerol hcl [meperidine]    Social History:   reports that she quit smoking about 3 years ago. Her smoking use included cigarettes. She smoked an average of .5 packs per day. She has never used smokeless tobacco. She reports current alcohol use. She reports current drug use. Drug: Marijuana Joellen Daniel). Family History:   Positive for early CAD    REVIEW OF SYSTEMS:    Constitutional: there has been no unanticipated weight loss. There's been No change in energy level, No change in activity level. Eyes: No visual changes or diplopia. No scleral icterus. ENT: No Headaches, hearing loss or vertigo. No mouth sores or sore throat. Cardiovascular: No problem  Respiratory: No previous reported problems  Gastrointestinal: No abdominal pain, appetite loss, blood in stools. No change in bowel or bladder habits. Genitourinary: No dysuria, trouble voiding, or hematuria. Musculoskeletal:  No gait disturbance, No weakness or joint complaints. Integumentary: No rash or pruritis. Neurological: No headache, diplopia, change in muscle strength, numbness or tingling. No change in gait, balance, coordination, mood, affect, memory, mentation, behavior. Psychiatric: No anxiety, or depression. Endocrine: No temperature intolerance. No excessive thirst, fluid intake, or urination. No tremor. Hematologic/Lymphatic: No abnormal bruising or bleeding, blood clots or swollen lymph nodes. Allergic/Immunologic: No nasal congestion or hives.     PHYSICAL EXAM:    Physical Examination: /64   Pulse 62   Temp 97.9 °F (36.6 °C) (Oral)   Resp 16   Ht 5' 1\" (1.549 m)   Wt 216 lb 0.8 oz (98 kg)   SpO2 93%   BMI 40.82 kg/m²    Constitutional and General Appearance: alert, cooperative, no distress and appears stated age  HEENT: PERRL, no cervical lymphadenopathy. No masses palpable. Normal oral mucosa  Respiratory:  Normal excursion and expansion without use of accessory muscles  Resp Auscultation: Good respiratory effort. No for increased work of breathing. On auscultation: clear to auscultation bilaterally  Cardiovascular: The apical impulse is not displaced  Heart tones are crisp and normal. regular S1 and S2. Murmurs:  None  Jugular venous pulsation Normal  The carotid upstroke is normal in amplitude and contour without delay or bruit  Peripheral pulses are symmetrical and full   Abdomen:  No masses or tenderness  Bowel sounds present  Extremities:   No Cyanosis or Clubbing   Lower extremity edema: Yes   Skin: Warm and dry  Neurological:  Alert and oriented. Moves all extremities well  No abnormalities of mood, affect, memory, mentation, or behavior are noted    DATA:    Diagnostics:      EKG:   Sinus rhythm; q waves in leads II, III, aVF; non-specific T wave abnormality    LEXISCAN STRESS TEST ( 9/16/22)    Moderate inferior ischemia  Normal segmental LV wall motion: LVEF 71%    Labs:     CBC:   Recent Labs     02/19/23  0607 02/20/23  0528   WBC 7.1 5.5   HGB 14.8 13.8   HCT 45.1 43.7    163       BMP:   Recent Labs     02/19/23  0607 02/20/23  0528    139   K 3.8 4.3   CO2 23 23   BUN 12 13   CREATININE 0.68 0.69   LABGLOM >60 >60   GLUCOSE 104* 123*       BNP: No results for input(s): BNP in the last 72 hours. PT/INR:   Recent Labs     02/19/23  0607   PROTIME 13.0   INR 1.0       APTT:No results for input(s): APTT in the last 72 hours. CARDIAC ENZYMES:No results for input(s): CKTOTAL, CKMB, CKMBINDEX, TROPONINI in the last 72 hours.   FASTING LIPID PANEL:  Lab Results   Component Value Date/Time    HDL 55 11/13/2021 08:12 AM    LDLCALC 134 08/04/2014 04:49 PM    TRIG 180 09/07/2018 04:10 AM     LIVER PROFILE:No results for input(s): AST, ALT, LABALBU in the last 72 hours. IMPRESSION:    Unstable angina  CAD with h/o PCI to the LAD in 2018  Essential HTN  HLP  Hypothyroidism    Patient Active Problem List   Diagnosis    Cloacal anomaly    Tethered spinal cord (HCC)    Hypothyroidism    Muscle spasm    Cold sore    ADHD (attention deficit hyperactivity disorder)    Essential hypertension    Insulin resistance    Vitamin D deficiency    Tobacco abuse    Chest pain    NSTEMI (non-ST elevated myocardial infarction) ([facility] [address]    Morbid obesity ([facility] [address]    History of heart artery stent    Arthritis    Post-operative pain    Contracture of joint of right foot    Pain in left foot    Hammertoe of second toe of right foot    Pain in right foot    Impingement syndrome of shoulder region       RECOMMENDATIONS:  Continue ASA, metoprolol and lisinopril  Plan for cardiac cath today per Dr. Mark Murphy  Risks and benefits reviewed. Pt verbalizes understanding. Pt does want to wait til tomorrow for cardiac cath so will transfer to  today. Discussed with patient and nursing.     Electronically signed by MARU Hinkle CNP on 2/20/2023 at 9:35 AM.  [place] [facility]

## 2023-02-20 NOTE — PROCEDURES
[facility]        Date:   2/20/2023  Patient name: Dakota Reynolds  Date of admission:  2/18/2023  5:58 PM  MRN:   [id number]  YOB: 1965    CARDIAC CATHETERIZATION    Operators:  Primary: Sarah Castellon MD.  Assistant:     Indications for cath: [place], Abnormal nuclear stress test.    Procedure performed: Cardiac cath. Access: Right Radial artery      Procedure: After informed consent was obtained with explanation of the risks and benefits, patient was brought to the cath lab. The right wrist was prepped and draped in sterile fashion. 1% lidocaine was used for local block. The Radial artery was cannulated with 6  Fr sheath with brisk arterial blood return. The side port was frequently flushed and aspirated with normal saline. Findings:    LMCA: Normal 0% stenosis. LAD: Mild irregularities 10-20%. with widely patent proximal stents     LCx: Normal 0% stenosis. RCA: Single stenosis. Lesion on Mid RCA: 80% stenosis 30 mm length reduced to 0%. Pre procedure     MAURISIO III flow was noted. Good runoff was present. The lesion was     diagnosed as Moderate Risk (B). Devices used         - Luge Wire 182 cm. Number of passes: 1.         - Euphora Balloon 3.0mm x 20mm. 1 inflation(s) to a max pressure of: 12     antonia. - Resolute Sarabjit 4.0 x 34 DELMER. 1 inflation(s) to a max pressure of: 16     antonia. The LV gram was performed in the EDDY 30 position. LVEF: 60%. LV Wall Motion: Normal.    Conclusions:    Successful PCI / Drug Eluting Stent of the mid RCA. Widely patent proximal LAD stents. Normal LCX. Normal LV systolic function. Recommendation:    Routine post stent orders. Medical treatments. Risk factors modifications.         Electronically signed by Zulma Butler MD on 2/20/2023 at [facility]  [phone]

## 2023-02-20 NOTE — PROGRESS NOTES
[facility]        2/20/2023   3:38 PM    Name:  Anyi Emmanuel  MRN:    [id number]     Acct:     [de-identified]   Room:  2005/2005-01  IP Day: 2     Admit Date: 2/18/2023  5:58 PM  PCP: Lilian Yancey MD    C/C:   Chief Complaint   Patient presents with    Chest Pain    Arm Pain     L side       Assessment:      Stable angina  Coronary artery disease with history of PCI to the LAD in 2018  Hypertension  Hyperlipidemia  Hypothyroidism  Obesity        Plan:        Patient is on MedSurg floor  O2 maintain oxygen saturation greater than 92%    Continue aspirin, metoprolol and lisinopril  Serial troponin  Echocardiogram  Cardiology consult, plan for cardiac catheterization on 2/20/2023      DVT and GI prophylaxis  Continue to monitor/telemetry/CBC with differential daily/BMP daily  Continue medications as below    Scheduled Meds:   aspirin  81 mg Oral Daily    gabapentin  300 mg Oral BID    levothyroxine  225 mcg Oral Daily    nicotine  1 patch TransDERmal Daily    lisinopril  10 mg Oral BID    metoprolol tartrate  12.5 mg Oral BID    sodium chloride flush  5-40 mL IntraVENous 2 times per day    enoxaparin  40 mg SubCUTAneous Daily     Continuous Infusions:   sodium chloride       PRN Meds:  nitroGLYCERIN, 0.4 mg, Q5 Min PRN  sodium chloride flush, 10 mL, PRN  sodium chloride, , PRN  potassium chloride, 40 mEq, PRN   Or  potassium alternative oral replacement, 40 mEq, PRN   Or  potassium chloride, 10 mEq, PRN  magnesium sulfate, 1,000 mg, PRN  ondansetron, 4 mg, Q8H PRN   Or  ondansetron, 4 mg, Q6H PRN  polyethylene glycol, 17 g, Daily PRN  acetaminophen, 650 mg, Q6H PRN   Or  acetaminophen, 650 mg, Q6H PRN  morphine, 2 mg, Q4H PRN            Subjective:     Patient seen and examined at bedside. No overnight events. No acute complaints today. Afebrile  Pt.  Denies any CP, SOB, palpitation, HA, dizziness, chills, cough, cold, changes in urination, BM or skin changes or any pain.    ROS:  A 10 point system reviewed and negative otherwise mentioned above. Physical Examination:      Vitals:  BP (!) 129/44   Pulse 62   Temp 98.2 °F (36.8 °C) (Oral)   Resp 16   Ht 5' 1\" (1.549 m)   Wt 216 lb 0.8 oz (98 kg)   SpO2 92%   BMI 40.82 kg/m²   Temp (24hrs), Av.1 °F (36.7 °C), Min:97.5 °F (36.4 °C), Max:98.4 °F (36.9 °C)    Weight:   Wt Readings from Last 3 Encounters:   23 216 lb 0.8 oz (98 kg)   22 205 lb 9.6 oz (93.3 kg)   22 211 lb (95.7 kg)     I/O last 3 completed shifts:  I/O last 3 completed shifts:  In: -   Out: 2250 [Urine:2250]     No results for input(s): POCGLU in the last 72 hours. General appearance - alert, well appearing, and in no acute distress  Mental status - oriented to person, place, and time with normal affect  Head - normocephalic and atraumatic  Eyes - pupils equal and reactive, extraocular eye movements intact, conjunctiva clear  Ears - hearing appears to be intact  Nose - no drainage noted  Mouth - mucous membranes moist  Neck - supple, no carotid bruits, thyroid not palpable  Chest - clear to auscultation, normal effort  Heart - normal rate, regular rhythm, no murmur  Abdomen - soft, nontender, nondistended, bowel sounds present all four quadrants, no masses, hepatomegaly or splenomegaly  Neurological - normal speech, no focal findings or movement disorder noted, cranial nerves II through XII grossly intact  Extremities - peripheral pulses palpable, no pedal edema or calf pain with palpation  Skin - no gross lesions, rashes, or induration noted        Medications: Allergies: Allergies   Allergen Reactions    Latex      itch    Cyclobenzaprine Diarrhea     Pt gets so relaxed she has no fecal control    Beef-Derived Products      Patient does not eat green vegetables and no beef products. Patient does eat salads and other types of meat.     Clonazepam     Clonidine Derivatives Other (See Comments)     Urinary issues    Demerol Hcl [Meperidine]      Palpations (heart)       Current Meds:   Current Facility-Administered Medications:     aspirin chewable tablet 81 mg, 81 mg, Oral, Daily, Adi De Guzman MD, 81 mg at 02/20/23 1009    gabapentin (NEURONTIN) capsule 300 mg, 300 mg, Oral, BID, Adi De Guzman MD, 300 mg at 02/20/23 1009    levothyroxine (SYNTHROID) tablet 225 mcg, 225 mcg, Oral, Daily, Adi De Guzman MD, 225 mcg at 02/20/23 0519    nitroGLYCERIN (NITROSTAT) SL tablet 0.4 mg, 0.4 mg, SubLINGual, Q5 Min PRN, Adi De Guzman MD    nicotine (NICODERM CQ) 14 MG/24HR 1 patch, 1 patch, TransDERmal, Daily, Adi De Guzman MD    lisinopril (PRINIVIL;ZESTRIL) tablet 10 mg, 10 mg, Oral, BID, Cindy A Lump, APRN - CNP, 10 mg at 02/20/23 1009    metoprolol tartrate (LOPRESSOR) split-tablet 12.5 mg, 12.5 mg, Oral, BID, Cindy A Lump, APRN - CNP, 12.5 mg at 02/19/23 2104    sodium chloride flush 0.9 % injection 5-40 mL, 5-40 mL, IntraVENous, 2 times per day, Cindy A Lump, APRN - CNP, 10 mL at 02/20/23 1015    sodium chloride flush 0.9 % injection 10 mL, 10 mL, IntraVENous, PRN, Cindy A Lump, APRN - CNP    0.9 % sodium chloride infusion, , IntraVENous, PRN, Cindy A Lump, APRN - CNP    potassium chloride (KLOR-CON M) extended release tablet 40 mEq, 40 mEq, Oral, PRN **OR** potassium bicarb-citric acid (EFFER-K) effervescent tablet 40 mEq, 40 mEq, Oral, PRN **OR** potassium chloride 10 mEq/100 mL IVPB (Peripheral Line), 10 mEq, IntraVENous, PRN, Cindy A Lump, APRN - CNP    magnesium sulfate 1000 mg in dextrose 5% 100 mL IVPB, 1,000 mg, IntraVENous, PRN, Cindy A Lump, APRN - CNP    enoxaparin (LOVENOX) injection 40 mg, 40 mg, SubCUTAneous, Daily, Cindy A Lump, APRN - CNP, 40 mg at 02/19/23 1813    ondansetron (ZOFRAN-ODT) disintegrating tablet 4 mg, 4 mg, Oral, Q8H PRN **OR** ondansetron (ZOFRAN) injection 4 mg, 4 mg, IntraVENous, Q6H PRN, Cindy A Lump, APRN - CNP    polyethylene glycol (GLYCOLAX) packet 17 g, 17 g, Oral, Daily PRN, Karyle Meadow Lump, APRN - CNP acetaminophen (TYLENOL) tablet 650 mg, 650 mg, Oral, Q6H PRN, 650 mg at 02/19/23 1818 **OR** acetaminophen (TYLENOL) suppository 650 mg, 650 mg, Rectal, Q6H PRN, Cindy A Lump, APRN - CNP    morphine (PF) injection 2 mg, 2 mg, IntraVENous, Q4H PRN, Cindy A Lump, APRN - CNP      I/O (24Hr): Intake/Output Summary (Last 24 hours) at 2/20/2023 1538  Last data filed at 2/19/2023 1820  Gross per 24 hour   Intake --   Output 1300 ml   Net -1300 ml       Data:           Labs:    Hematology:  Recent Labs     02/18/23  1835 02/19/23 0607 02/20/23  0528   WBC 7.6 7.1 5.5   RBC 4.40 4.58 4.33   HGB 14.2 14.8 13.8   HCT 43.5 45.1 43.7   MCV 98.9 98.5 100.9   MCH 32.3 32.3 31.9   MCHC 32.6 32.8 31.6   RDW 13.0 13.0 13.1    186 163   MPV 9.9 10.4 10.3   INR  --  1.0  --      Chemistry:  Recent Labs     02/18/23  1835 02/19/23  0607 02/19/23  1521 02/19/23 2033 02/20/23  0528    139  --   --  139   K 4.3 3.8  --   --  4.3    105  --   --  109*   CO2 23 23  --   --  23   GLUCOSE 105* 104*  --   --  123*   BUN 13 12  --   --  13   CREATININE 0.74 0.68  --   --  0.69   ANIONGAP 11 11  --   --  7*   LABGLOM >60 >60  --   --  >60   CALCIUM 9.3 9.4  --   --  8.8   TROPHS 9 11 9 8  --      No results for input(s): PROT, LABALBU, LABA1C, Y2YEMMH, I2OYTKP, FT4, TSH, AST, ALT, LDH, GGT, ALKPHOS, LABGGT, BILITOT, BILIDIR, AMMONIA, AMYLASE, LIPASE, LACTATE, CHOL, HDL, LDLCHOLESTEROL, CHOLHDLRATIO, TRIG, VLDL, LAB03WJ, PHENYTOIN, PHENYF, URICACID, POCGLU in the last 72 hours. No results found for: SPECIAL  No results found for: CULTURE    No results found for: POCPH, PHART, PH, POCPCO2, JMF4JNK, PCO2, POCPO2, PO2ART, PO2, POCHCO3, MCH2USC, HCO3, NBEA, PBEA, BEART, BE, THGBART, THB, YKO0LGN, HTTB7GOT, Y3QSLBQC, O2SAT, FIO2    Radiology:    XR CHEST PORTABLE    Result Date: 2/18/2023  No acute abnormality.          All radiological studies reviewed  Code Status:  Full Code        Electronically signed by Phani Nelson MD on 2/20/2023 at 3:38 PM    This note was created with the assistance of a speech-recognition program.  Although the intention is to generate a document that actually reflects the content of the visit, no guarantees can be provided that every mistake has been identified and corrected by editing. Note was updated later by me after  physical examination and  completion of the assessment.

## 2023-02-20 NOTE — PROGRESS NOTES
Patient needing cardiac cath today. Cardiologist not available to do procedure here today. Patient needing to be transferred to [facility] for planned cardiac cath with Dr. Leopoldo Pedersen. Call placed to [facility] to set up for transfer for procedure with plan for return as long as no interventions needed. Waiting on return call after transport is set up.

## 2023-02-20 NOTE — PROGRESS NOTES
Call received from [facility] service to schedule service. Transport on the way. As staff was setting up for transfer, call received from ANDREA Walker to discuss transfer. Dr. Whitley had a cancellation for later today and will now be able to come in for the procedure here at [facility]. Nurse Claudette Grice notified and is informing patient. [facility] transport canceled. Call placed to [facility] to notify of cancellation of transfer.

## 2023-02-20 NOTE — PLAN OF CARE
Problem: Discharge Planning  Goal: Discharge to home or other facility with appropriate resources  Outcome: Progressing     Problem: Pain  Goal: Verbalizes/displays adequate comfort level or baseline comfort level  Outcome: Progressing  Flowsheets (Taken 2/20/2023 0017)  Verbalizes/displays adequate comfort level or baseline comfort level:   Encourage patient to monitor pain and request assistance   Administer analgesics based on type and severity of pain and evaluate response   Consider cultural and social influences on pain and pain management   Assess pain using appropriate pain scale   Implement non-pharmacological measures as appropriate and evaluate response

## 2023-02-20 NOTE — PROGRESS NOTES
Pt received from cath lab per bed. Pt denies discomfort, however she is tearful about health and family issues.

## 2023-02-21 VITALS
HEART RATE: 63 BPM | RESPIRATION RATE: 17 BRPM | TEMPERATURE: 97.6 F | BODY MASS INDEX: 40.67 KG/M2 | DIASTOLIC BLOOD PRESSURE: 65 MMHG | SYSTOLIC BLOOD PRESSURE: 107 MMHG | HEIGHT: 61 IN | WEIGHT: 215.39 LBS | OXYGEN SATURATION: 97 %

## 2023-02-21 LAB
ABSOLUTE EOS #: 0.15 K/UL (ref 0–0.44)
ABSOLUTE IMMATURE GRANULOCYTE: 0.03 K/UL (ref 0–0.3)
ABSOLUTE LYMPH #: 2.72 K/UL (ref 1.1–3.7)
ABSOLUTE MONO #: 0.51 K/UL (ref 0.1–1.2)
ANION GAP SERPL CALCULATED.3IONS-SCNC: 10 MMOL/L (ref 9–17)
BASOPHILS # BLD: 1 % (ref 0–2)
BASOPHILS ABSOLUTE: 0.04 K/UL (ref 0–0.2)
BUN SERPL-MCNC: 11 MG/DL (ref 6–20)
BUN/CREAT BLD: 14 (ref 9–20)
CALCIUM SERPL-MCNC: 9.1 MG/DL (ref 8.6–10.4)
CHLORIDE SERPL-SCNC: 108 MMOL/L (ref 98–107)
CO2 SERPL-SCNC: 23 MMOL/L (ref 20–31)
CREAT SERPL-MCNC: 0.77 MG/DL (ref 0.5–0.9)
EKG ATRIAL RATE: 64 BPM
EKG P AXIS: 65 DEGREES
EKG P-R INTERVAL: 156 MS
EKG Q-T INTERVAL: 360 MS
EKG QRS DURATION: 90 MS
EKG QTC CALCULATION (BAZETT): 371 MS
EKG R AXIS: 58 DEGREES
EKG T AXIS: 73 DEGREES
EKG VENTRICULAR RATE: 64 BPM
EOSINOPHILS RELATIVE PERCENT: 2 % (ref 1–4)
GFR SERPL CREATININE-BSD FRML MDRD: >60 ML/MIN/1.73M2
GLUCOSE SERPL-MCNC: 104 MG/DL (ref 70–99)
HCT VFR BLD AUTO: 46.5 % (ref 36.3–47.1)
HGB BLD-MCNC: 14.9 G/DL (ref 11.9–15.1)
IMMATURE GRANULOCYTES: 1 %
LYMPHOCYTES # BLD: 41 % (ref 24–43)
MCH RBC QN AUTO: 32.4 PG (ref 25.2–33.5)
MCHC RBC AUTO-ENTMCNC: 32 G/DL (ref 28.4–34.8)
MCV RBC AUTO: 101.1 FL (ref 82.6–102.9)
MONOCYTES # BLD: 8 % (ref 3–12)
NRBC AUTOMATED: 0 PER 100 WBC
PDW BLD-RTO: 13.2 % (ref 11.8–14.4)
PLATELET # BLD AUTO: 180 K/UL (ref 138–453)
PMV BLD AUTO: 10.3 FL (ref 8.1–13.5)
POTASSIUM SERPL-SCNC: 4.5 MMOL/L (ref 3.7–5.3)
RBC # BLD: 4.6 M/UL (ref 3.95–5.11)
SEG NEUTROPHILS: 47 % (ref 36–65)
SEGMENTED NEUTROPHILS ABSOLUTE COUNT: 3.13 K/UL (ref 1.5–8.1)
SODIUM SERPL-SCNC: 141 MMOL/L (ref 135–144)
WBC # BLD AUTO: 6.6 K/UL (ref 3.5–11.3)

## 2023-02-21 PROCEDURE — 6360000002 HC RX W HCPCS: Performed by: NURSE PRACTITIONER

## 2023-02-21 PROCEDURE — 36415 COLL VENOUS BLD VENIPUNCTURE: CPT

## 2023-02-21 PROCEDURE — 2580000003 HC RX 258: Performed by: NURSE PRACTITIONER

## 2023-02-21 PROCEDURE — 6370000000 HC RX 637 (ALT 250 FOR IP): Performed by: INTERNAL MEDICINE

## 2023-02-21 PROCEDURE — 80048 BASIC METABOLIC PNL TOTAL CA: CPT

## 2023-02-21 PROCEDURE — 6370000000 HC RX 637 (ALT 250 FOR IP): Performed by: FAMILY MEDICINE

## 2023-02-21 PROCEDURE — 6370000000 HC RX 637 (ALT 250 FOR IP): Performed by: NURSE PRACTITIONER

## 2023-02-21 PROCEDURE — 85025 COMPLETE CBC W/AUTO DIFF WBC: CPT

## 2023-02-21 RX ORDER — LISINOPRIL 10 MG/1
10 TABLET ORAL 2 TIMES DAILY
Qty: 30 TABLET | Refills: 0 | Status: SHIPPED | OUTPATIENT
Start: 2023-02-21

## 2023-02-21 RX ORDER — ROSUVASTATIN CALCIUM 40 MG/1
40 TABLET, COATED ORAL NIGHTLY
Qty: 30 TABLET | Refills: 0 | Status: SHIPPED | OUTPATIENT
Start: 2023-02-21

## 2023-02-21 RX ADMIN — ASPIRIN 81 MG: 81 TABLET, COATED ORAL at 09:26

## 2023-02-21 RX ADMIN — ENOXAPARIN SODIUM 40 MG: 100 INJECTION SUBCUTANEOUS at 09:25

## 2023-02-21 RX ADMIN — TICAGRELOR 90 MG: 90 TABLET ORAL at 09:26

## 2023-02-21 RX ADMIN — Medication 12.5 MG: at 09:26

## 2023-02-21 RX ADMIN — LEVOTHYROXINE SODIUM 225 MCG: 0.2 TABLET ORAL at 06:30

## 2023-02-21 RX ADMIN — GABAPENTIN 300 MG: 300 CAPSULE ORAL at 09:26

## 2023-02-21 RX ADMIN — LISINOPRIL 10 MG: 10 TABLET ORAL at 09:26

## 2023-02-21 RX ADMIN — SODIUM CHLORIDE, PRESERVATIVE FREE 10 ML: 5 INJECTION INTRAVENOUS at 09:27

## 2023-02-21 NOTE — PROGRESS NOTES
IV and telemetry removed. Discharge instructions gone over with patient, all questions answered. All patient belongings gathered and patient transported via wheelchair to sister's private vehicle.

## 2023-02-21 NOTE — DISCHARGE SUMMARY
[facility]    Adult Hospitalist      Patient ID: Ehsan Monaco  MRN: [id number]     Acct:  [de-identified]       Patient's PCP: Michael Lazo MD    Admit Date: 2/18/2023     Discharge Date: 2/21/2023      Admitting Physician: Roberta Brock MD    Discharge Physician: Katy Howard MD     CONSULTANTS: Patient Care Team:  Michael Lazo MD as PCP - General (Internal Medicine)  [facility]  Jayshree Fernández MD (Physical Medicine and Rehab)  Yaritza Luis MD as Consulting Physician (Endocrinology)  Joel Sosa MD (Obstetrics & Gynecology)  Xochitl Cobb MD (Urology)  Damien Morales MD as Consulting Physician (Cardiology)  Maru Lagunas DPM as Physician (Podiatry)  Torsten Goodson MD as Surgeon (Orthopedic Surgery)  Alma Ziegler DPM as Physician (Podiatry)    PROCEDURES PERFORMED: Cardiac cath    Active Discharge Diagnoses:  Unstable angina  Coronary artery disease with history of PCI to the LAD in 2018  Hypertension  Hyperlipidemia  Hypothyroidism  Obesity      Primary Problem  Chest pain    Hospital Course: The patient is a 56 y.o woman with h/o HTN, HLP and CAD, s/p NSTEMI with PCI to the LAD in 2018, admitted for recent chest pain. Over the past week she has had periods of substernal and left-sided chest pressure and burning sensation, sometimes radiating to her left arm. She had some diaphoresis . In the ER her EKG showed no acute changes, and serial troponins are 9 and 11 ng/L. Patient was seen  and evaluated by Cardiology. Patient has had cardiac catheterization with successful PCI/ drug-eluting stent of the mid RCA. Widely patent proximal LAD stents. Normal LCX. Normal LV systolic function. Cardiology recommended to continue aspirin, Brilinta, metoprolol and lisinopril. Patient is to maintain a close outpatient follow-up appointment with Cardiology.       At the time of discharge patient was hemodynamically stable and asymptomatic. The plan was discussed in detail with patient who agreed with the plan and verbalized understanding . The patient was seen and examined on day of discharge and this discharge summary is in conjunction with any daily progress note from day of discharge. Hospital Data:    Labs:    Hematology:  Recent Labs     02/21/23  0525   WBC 6.6   RBC 4.60   HGB 14.9   HCT 46.5   .1   MCH 32.4   MCHC 32.0   RDW 13.2      MPV 10.3     Chemistry:  Recent Labs     02/21/23  0525      K 4.5   *   CO2 23   GLUCOSE 104*   BUN 11   CREATININE 0.77   ANIONGAP 10   LABGLOM >60   CALCIUM 9.1     No results for input(s): PROT, LABALBU, LABA1C, B9QBERJ, A7LICKX, FT4, TSH, AST, ALT, LDH, GGT, ALKPHOS, LABGGT, BILITOT, BILIDIR, AMMONIA, AMYLASE, LIPASE, LACTATE, CHOL, HDL, LDLCHOLESTEROL, CHOLHDLRATIO, TRIG, VLDL, WSV51HI, PHENYTOIN, PHENYF, URICACID, POCGLU in the last 72 hours. Lab Results   Component Value Date    INR 1.0 02/19/2023    INR 1.0 11/13/2021    INR 0.9 09/30/2015    PROTIME 13.0 02/19/2023    PROTIME 12.7 11/13/2021    PROTIME 9.6 (L) 09/30/2015     No results found for: SPECIAL  No results found for: CULTURE    No results found for: POCPH, PHART, PH, POCPCO2, NRI4XMV, PCO2, POCPO2, PO2ART, PO2, POCHCO3, WXW0CKO, HCO3, NBEA, PBEA, BEART, BE, THGBART, THB, LWS7KUJ, ERDD4LPT, L4QBLJXX, O2SAT, FIO2    Radiology:    XR CHEST PORTABLE    Result Date: 2/18/2023  No acute abnormality. All radiological studies reviewed      Reviews of Symptoms:    A 10 point system is reviewed and  negative except described in hospital course    Physical Exam:    Vitals:  /65   Pulse 63   Temp 97.6 °F (36.4 °C) (Temporal)   Resp 17   Ht 5' 1\" (1.549 m)   Wt 215 lb 6.3 oz (97.7 kg)   SpO2 97%   BMI 40.70 kg/m²   No data recorded.       General appearance - alert, well appearing, and in no acute distress  Mental status - oriented to person, place, and time with normal affect  Head - normocephalic and atraumatic  Eyes - pupils equal and reactive, extraocular eye movements intact, conjunctiva clear  Ears - hearing appears to be intact  Nose - no drainage noted  Mouth - mucous membranes moist  Neck - supple, no carotid bruits, thyroid not palpable  Chest - clear to auscultation, normal effort  Heart - normal rate, regular rhythm, no murmur  Abdomen - soft, nontender, nondistended, bowel sounds present all four quadrants, no masses, hepatomegaly or splenomegaly  Neurological - normal speech, no focal findings or movement disorder noted, cranial nerves II through XII grossly intact  Extremities - peripheral pulses palpable, no pedal edema or calf pain with palpation  Skin - no gross lesions, rashes, or induration noted      Consults:  IP CONSULT TO INTERNAL MEDICINE  IP CONSULT TO CARDIOLOGY    Disposition: Home    Discharged Condition: Stable    Follow Up: [facility]  [address] [place], [place] [id number] [phone]  Follow up in 2 week(s)      Larissa Sherwood MD  [facility]  [phone]    Follow up in 2 day(s)      Larissa Sherwood MD  [address] [facility] [address]  [phone]          Lab Frequency Next Occurrence   Up with assistance PRN    Intake and output EVERY 8 HOURS    Initiate Oxygen Therapy Protocol PRN    Pulse Oximetry Spot Check PRN    Basic Metabolic Panel w/ Reflex to MG Daily (Lab)    CBC with Auto Differential Daily (Lab)    Initiate Oxygen Therapy Protocol PRN          Diet: No diet orders on file    Discharge Medications:      Medication List        START taking these medications      rosuvastatin 40 MG tablet  Commonly known as: CRESTOR  Take 1 tablet by mouth nightly     ticagrelor 90 MG Tabs tablet  Commonly known as: BRILINTA  Take 1 tablet by mouth 2 times daily            CHANGE how you take these medications      lisinopril 10 MG tablet  Commonly known as: PRINIVIL;ZESTRIL  Take 1 tablet by mouth 2 times daily  What changed:   medication strength  how much to take  when to take this     nicotine 7 MG/24HR  Commonly known as: Nicoderm CQ  Place 1 patch onto the skin every 24 hours for 14 days  What changed: Another medication with the same name was removed. Continue taking this medication, and follow the directions you see here. CONTINUE taking these medications      aspirin 81 MG chewable tablet  Take 1 tablet by mouth daily     atomoxetine 100 MG capsule  Commonly known as: STRATTERA  Take 1 capsule by mouth daily     baclofen 10 MG tablet  Commonly known as: LIORESAL     gabapentin 300 MG capsule  Commonly known as: NEURONTIN     levothyroxine 175 MCG tablet  Commonly known as: SYNTHROID     meloxicam 15 MG tablet  Commonly known as: MOBIC  Take 1 tablet by mouth daily     metoprolol tartrate 25 MG tablet  Commonly known as: LOPRESSOR     Myrbetriq 50 MG Tb24  Generic drug: mirabegron     nitroGLYCERIN 0.4 MG SL tablet  Commonly known as: NITROSTAT  Place 1 tablet under tongue upon chest pain, wait 5 minutes and may repeat up to 3 doses in 15 minutes. Do not crush or break. If no relief after 1 dose, call 911. valACYclovir 500 MG tablet  Commonly known as: VALTREX  Take 1 tablet by mouth every evening     Wrist Splint/Cock-Up/Left M Misc  Wear on left wrist nightly for carpal tunnel.             STOP taking these medications      HYDROcodone-acetaminophen 5-325 MG per tablet  Commonly known as: NORCO     methocarbamol 500 MG tablet  Commonly known as: ROBAXIN     tolterodine 4 MG extended release capsule  Commonly known as: DETROL LA               Where to Get Your Medications        These medications were sent to [facility] [address], [address] [place] [address] [phone]  [address] [place] [address]      Phone: [phone]   lisinopril 10 MG tablet  rosuvastatin 40 MG tablet  ticagrelor 90 MG Tabs tablet         Code Status:  Prior    Time Spent on discharge is  35 mins in patient examination, evaluation, counseling as well as medication reconciliation, prescriptions for required medications, discharge plan and follow up. Electronically signed by Lupillo Shrestha MD on 2/23/2023 at 11:11 AM     Thank you Dr. Jordan Castro MD for the opportunity to be involved in this patient's care. This note was created with the assistance of a speech-recognition program.  Although the intention is to generate a document that actually reflects the content of the visit, no guarantees can be provided that every mistake has been identified and corrected by editing. Note was updated later by me after  physical examination and  completion of the assessment.

## 2023-02-21 NOTE — PROGRESS NOTES
CLINICAL PHARMACY NOTE: MEDS TO BEDS    Total # of Prescriptions Filled: 1   The following medications were delivered to the patient:  Brilinta 90mg    Additional Documentation:

## 2023-02-21 NOTE — PROGRESS NOTES
[facility]  Inpatient Cardiology Progress             Date:   2/19/23  Patient name: Dhara Gonzalez  Date of admission:  2/18/2023  5:58 PM  MRN:   [id number]  YOB: 1965      Reason for Admission:  Unstable angina    CHIEF COMPLAINT:  Chest pain     Subjective:  No cp or sob  S/p cath with PCI as below.   Radial site- normal pulses, no hematoma.       Current Facility-Administered Medications:     0.9 % sodium chloride infusion, , IntraVENous, Continuous, Vitaly Garcia MD, Last Rate: 75 mL/hr at 02/20/23 1742, New Bag at 02/20/23 1742    sodium chloride flush 0.9 % injection 5-40 mL, 5-40 mL, IntraVENous, 2 times per day, Vitaly Garcai MD    sodium chloride flush 0.9 % injection 5-40 mL, 5-40 mL, IntraVENous, PRN, Vitaly Garcia MD    0.9 % sodium chloride infusion, , IntraVENous, PRN, Vitaly Garcia MD    acetaminophen (TYLENOL) tablet 650 mg, 650 mg, Oral, Q4H PRN, Vitaly Garcia MD, 650 mg at 02/20/23 1949    aspirin EC tablet 81 mg, 81 mg, Oral, Daily, Vitaly Garcia MD, 81 mg at 02/21/23 0926    rosuvastatin (CRESTOR) tablet 40 mg, 40 mg, Oral, Nightly, Vitaly Garcia MD, 40 mg at 02/20/23 2116    ticagrelor (BRILINTA) tablet 90 mg, 90 mg, Oral, BID, Vitaly Garcia MD, 90 mg at 02/21/23 0926    gabapentin (NEURONTIN) capsule 300 mg, 300 mg, Oral, BID, Adi De Guzman MD, 300 mg at 02/21/23 0926    levothyroxine (SYNTHROID) tablet 225 mcg, 225 mcg, Oral, Daily, Adi De Guzman MD, 225 mcg at 02/21/23 0630    nitroGLYCERIN (NITROSTAT) SL tablet 0.4 mg, 0.4 mg, SubLINGual, Q5 Min PRN, Adi De Guzman MD    nicotine (NICODERM CQ) 14 MG/24HR 1 patch, 1 patch, TransDERmal, Daily, Adi eD Guzman MD    lisinopril (PRINIVIL;ZESTRIL) tablet 10 mg, 10 mg, Oral, BID, Cindy A Lump, APRN - CNP, 10 mg at 02/21/23 0926    metoprolol tartrate (LOPRESSOR) split-tablet 12.5 mg, 12.5 mg, Oral, BID, Cindy A Lump, APRN - CNP, 12.5 mg at 02/21/23 0926    sodium chloride flush 0.9 % injection  5-40 mL, 5-40 mL, IntraVENous, 2 times per day, Cindy A Lump, APRN - CNP, 10 mL at 02/21/23 0927    sodium chloride flush 0.9 % injection 10 mL, 10 mL, IntraVENous, PRN, Cindy A Lump, APRN - CNP    potassium chloride (KLOR-CON M) extended release tablet 40 mEq, 40 mEq, Oral, PRN **OR** potassium bicarb-citric acid (EFFER-K) effervescent tablet 40 mEq, 40 mEq, Oral, PRN **OR** potassium chloride 10 mEq/100 mL IVPB (Peripheral Line), 10 mEq, IntraVENous, PRN, Cindy A Lump, APRN - CNP    magnesium sulfate 1000 mg in dextrose 5% 100 mL IVPB, 1,000 mg, IntraVENous, PRN, Cindy A Lump, APRN - CNP    enoxaparin (LOVENOX) injection 40 mg, 40 mg, SubCUTAneous, Daily, Cindy A Lump, APRN - CNP, 40 mg at 02/21/23 0925    ondansetron (ZOFRAN-ODT) disintegrating tablet 4 mg, 4 mg, Oral, Q8H PRN **OR** ondansetron (ZOFRAN) injection 4 mg, 4 mg, IntraVENous, Q6H PRN, Cindy A Lump, APRN - CNP    polyethylene glycol (GLYCOLAX) packet 17 g, 17 g, Oral, Daily PRN, Cindy A Lump, APRN - CNP    morphine (PF) injection 2 mg, 2 mg, IntraVENous, Q4H PRN, Cindy A Lump, APRN - CNP, 2 mg at 02/20/23 2116    Allergies   Allergen Reactions    Latex      itch    Cyclobenzaprine Diarrhea     Pt gets so relaxed she has no fecal control    Beef-Derived Products      Patient does not eat green vegetables and no beef products. Patient does eat salads and other types of meat. Clonazepam     Clonidine Derivatives Other (See Comments)     Urinary issues    Demerol Hcl [Meperidine]      Palpations (heart)       PHYSICAL EXAM:    Physical Examination:    BP (!) 106/42   Pulse 74   Temp 97.9 °F (36.6 °C) (Oral)   Resp 18   Ht 5' 1\" (1.549 m)   Wt 215 lb 6.3 oz (97.7 kg)   SpO2 94%   BMI 40.70 kg/m²    Constitutional and General Appearance: alert, cooperative, no distress and appears stated age  HEENT: PERRL, no cervical lymphadenopathy. No masses palpable.  Normal oral mucosa  Respiratory:  Normal excursion and expansion without use of accessory muscles  Resp Auscultation: Good respiratory effort. No for increased work of breathing. On auscultation: clear to auscultation bilaterally  Cardiovascular: The apical impulse is not displaced  Heart tones are crisp and normal. regular S1 and S2. Murmurs:  None  Jugular venous pulsation Normal  The carotid upstroke is normal in amplitude and contour without delay or bruit  Peripheral pulses are symmetrical and full   Abdomen:  No masses or tenderness  Bowel sounds present  Extremities:   No radial site injection hematoma, normal pulse  Neurological:  Alert and oriented. Moves all extremities well  No abnormalities of mood, affect, memory, mentation, or behavior are noted    DATA:    Diagnostics:      EKG:   Sinus rhythm; q waves in leads II, III, aVF; non-specific T wave abnormality    LEXISCAN STRESS TEST ( 9/16/22)    Moderate inferior ischemia  Normal segmental LV wall motion: LVEF 71%    Labs:     CBC:   Recent Labs     02/20/23  0528 02/21/23  0525   WBC 5.5 6.6   HGB 13.8 14.9   HCT 43.7 46.5    180       BMP:   Recent Labs     02/20/23  0528 02/21/23  0525    141   K 4.3 4.5   CO2 23 23   BUN 13 11   CREATININE 0.69 0.77   LABGLOM >60 >60   GLUCOSE 123* 104*       BNP: No results for input(s): BNP in the last 72 hours. PT/INR:   Recent Labs     02/19/23  0607   PROTIME 13.0   INR 1.0       APTT:No results for input(s): APTT in the last 72 hours. CARDIAC ENZYMES:No results for input(s): CKTOTAL, CKMB, CKMBINDEX, TROPONINI in the last 72 hours. FASTING LIPID PANEL:  Lab Results   Component Value Date/Time    HDL 55 11/13/2021 08:12 AM    LDLCALC 134 08/04/2014 04:49 PM    TRIG 180 09/07/2018 04:10 AM     LIVER PROFILE:No results for input(s): AST, ALT, LABALBU in the last 72 hours. Cath 2/20/23:    Successful PCI / Drug Eluting Stent of the mid RCA. Widely patent proximal LAD stents. Normal LCX. Normal LV systolic function.       IMPRESSION:    Unstable angina- s/p PCI to [facility] on 2/20/23  CAD with h/o PCI to the LAD in 2018- patent on cath 2/20/23  Essential HTN- controlled   HLP  Hypothyroidism    RECOMMENDATIONS:  Continue ASA, Brilinta, metoprolol and lisinopril  Okay for d/c today from cardiac. Follow up with us in 2 weeks. Discussed with patient and nursing.     Electronically signed by Nidia Ray DO on 2/21/2023 at 11:16 AM.  [facility]

## 2023-02-22 NOTE — PROGRESS NOTES
Patient called the unit with questions about her medications. She was unsure whether to take brilinta this evening. Her discharge instructions indicate she received brilinta this morning and should take it this evening. Patient was advised to take brilinta this evening as directed by discharge instructions.  Nehal Duarte RN

## 2023-02-24 ENCOUNTER — HOSPITAL ENCOUNTER (EMERGENCY)
Age: 58
Discharge: HOME | End: 2023-02-24
Attending: STUDENT IN AN ORGANIZED HEALTH CARE EDUCATION/TRAINING PROGRAM
Payer: COMMERCIAL

## 2023-02-24 ENCOUNTER — APPOINTMENT (OUTPATIENT)
Dept: GENERAL RADIOLOGY | Age: 58
End: 2023-02-24
Payer: COMMERCIAL

## 2023-02-24 VITALS
OXYGEN SATURATION: 94 % | TEMPERATURE: 98.4 F | HEART RATE: 67 BPM | RESPIRATION RATE: 21 BRPM | SYSTOLIC BLOOD PRESSURE: 117 MMHG | DIASTOLIC BLOOD PRESSURE: 61 MMHG

## 2023-02-24 DIAGNOSIS — R07.9 CHEST PAIN, UNSPECIFIED TYPE: Primary | ICD-10-CM

## 2023-02-24 LAB
ABSOLUTE EOS #: 0.16 K/UL (ref 0–0.44)
ABSOLUTE IMMATURE GRANULOCYTE: 0.04 K/UL (ref 0–0.3)
ABSOLUTE LYMPH #: 3.07 K/UL (ref 1.1–3.7)
ABSOLUTE MONO #: 0.46 K/UL (ref 0.1–1.2)
ANION GAP SERPL CALCULATED.3IONS-SCNC: 12 MMOL/L (ref 9–17)
BASOPHILS # BLD: 1 % (ref 0–2)
BASOPHILS ABSOLUTE: 0.04 K/UL (ref 0–0.2)
BNP SERPL-MCNC: <36 PG/ML
BUN SERPL-MCNC: 12 MG/DL (ref 6–20)
BUN/CREAT BLD: 14 (ref 9–20)
CALCIUM SERPL-MCNC: 9.7 MG/DL (ref 8.6–10.4)
CHLORIDE SERPL-SCNC: 105 MMOL/L (ref 98–107)
CO2 SERPL-SCNC: 21 MMOL/L (ref 20–31)
CREAT SERPL-MCNC: 0.87 MG/DL (ref 0.5–0.9)
EOSINOPHILS RELATIVE PERCENT: 2 % (ref 1–4)
GFR SERPL CREATININE-BSD FRML MDRD: >60 ML/MIN/1.73M2
GLUCOSE SERPL-MCNC: 132 MG/DL (ref 70–99)
HCT VFR BLD AUTO: 44.8 % (ref 36.3–47.1)
HGB BLD-MCNC: 14.7 G/DL (ref 11.9–15.1)
IMMATURE GRANULOCYTES: 1 %
LYMPHOCYTES # BLD: 44 % (ref 24–43)
MCH RBC QN AUTO: 32.2 PG (ref 25.2–33.5)
MCHC RBC AUTO-ENTMCNC: 32.8 G/DL (ref 28.4–34.8)
MCV RBC AUTO: 98.2 FL (ref 82.6–102.9)
MONOCYTES # BLD: 7 % (ref 3–12)
NRBC AUTOMATED: 0 PER 100 WBC
PDW BLD-RTO: 13 % (ref 11.8–14.4)
PLATELET # BLD AUTO: 183 K/UL (ref 138–453)
PMV BLD AUTO: 10.5 FL (ref 8.1–13.5)
POTASSIUM SERPL-SCNC: 4.2 MMOL/L (ref 3.7–5.3)
RBC # BLD: 4.56 M/UL (ref 3.95–5.11)
SEG NEUTROPHILS: 45 % (ref 36–65)
SEGMENTED NEUTROPHILS ABSOLUTE COUNT: 3.07 K/UL (ref 1.5–8.1)
SODIUM SERPL-SCNC: 138 MMOL/L (ref 135–144)
TROPONIN I SERPL DL<=0.01 NG/ML-MCNC: 18 NG/L (ref 0–14)
TROPONIN I SERPL DL<=0.01 NG/ML-MCNC: 19 NG/L (ref 0–14)
WBC # BLD AUTO: 6.8 K/UL (ref 3.5–11.3)

## 2023-02-24 PROCEDURE — 85025 COMPLETE CBC W/AUTO DIFF WBC: CPT

## 2023-02-24 PROCEDURE — 84484 ASSAY OF TROPONIN QUANT: CPT

## 2023-02-24 PROCEDURE — 93005 ELECTROCARDIOGRAM TRACING: CPT | Performed by: STUDENT IN AN ORGANIZED HEALTH CARE EDUCATION/TRAINING PROGRAM

## 2023-02-24 PROCEDURE — 99285 EMERGENCY DEPT VISIT HI MDM: CPT

## 2023-02-24 PROCEDURE — 80048 BASIC METABOLIC PNL TOTAL CA: CPT

## 2023-02-24 PROCEDURE — 71045 X-RAY EXAM CHEST 1 VIEW: CPT

## 2023-02-24 PROCEDURE — 83880 ASSAY OF NATRIURETIC PEPTIDE: CPT

## 2023-02-24 RX ORDER — NITROGLYCERIN 0.4 MG/1
TABLET SUBLINGUAL
Qty: 25 TABLET | Refills: 0 | Status: SHIPPED | OUTPATIENT
Start: 2023-02-24

## 2023-02-24 ASSESSMENT — PAIN - FUNCTIONAL ASSESSMENT: PAIN_FUNCTIONAL_ASSESSMENT: 0-10

## 2023-02-24 ASSESSMENT — HEART SCORE: ECG: 1

## 2023-02-24 ASSESSMENT — PAIN SCALES - GENERAL: PAINLEVEL_OUTOF10: 8

## 2023-02-24 NOTE — ED PROVIDER NOTES
[facility]      Pt Name: Radu Barajas  MRN: [id number]  [place] 1965  Date of evaluation: 2/24/23    CHIEF COMPLAINT       Chief Complaint   Patient presents with    Chest Pain       HISTORY OF PRESENT ILLNESS   Radu Barajas is a 56 y.o. female who presents with left inferior rib pain, history of coronary artery disease. Follows with Dr. Jaskaran Rojas and group. Previous history of LAD stenting was admitted few days prior and had RCA stented. States awoke from sleep with sudden onset left inferior chest pain. 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 History of urinary retention after anesthesia.      Hx of varicose veins     Hyperlipidemia     Insulin resistance 3/28/2017    Neurogenic bladder     NSTEMI (non-ST elevated myocardial infarction) ([address] 09/06/2018    mild    Retention of urine     Thyroid disease     Tobacco abuse 3/21/2018    Vitamin D deficiency 3/21/2018     Past Problem List  Patient Active Problem List   Diagnosis Code    Cloacal anomaly Q43.7    Tethered spinal cord ([facility] [address] Q06.8    Hypothyroidism E03.9    Muscle spasm M62.838    Cold sore B00.1    ADHD (attention deficit hyperactivity disorder) F90.9    Essential hypertension I10    Insulin resistance E88.81    Vitamin D deficiency E55.9    Tobacco abuse Z72.0    Chest pain R07.9    NSTEMI (non-ST elevated myocardial infarction) ([address] I21.4    Morbid obesity ([address] E66.01    History of heart artery stent Z95.5 Arthritis M19.90    Post-operative pain G89.18    Contracture of joint of right foot M24.574    Pain in left foot M79.672    Hammertoe of second toe of right foot M20.41    Pain in right foot M79.671    Impingement syndrome of shoulder region M75.40       SURGICAL HISTORY       Past Surgical History:   Procedure Laterality Date    ANKLE FRACTURE SURGERY Left 2007    then hardware removed in 2011    ANUS SURGERY  1972    anal pullthrough    ARTHROPLASTY Right 11/08/2019    ARTHROPLASTY RIGHT 2ND DIGIT WITH WEIL OSTEOTOMY RIGHT performed by Nia Grady DPM at [address] [place] [address]  09/06/2018    STENT X2  -  [employer] [employer] ([facility]) 3T Safe Immed. KNEE ARTHROSCOPY Left 1994, 2001, 2007    meniscal tear, lateral release, scraped arthritis    OVARY REMOVAL Left     mass on ovary    SPINE SURGERY  untethered with laminectomy 1994, 2003    VAGINA RECONSTRUCTION SURGERY  1986       CURRENT MEDICATIONS       Previous Medications    ASPIRIN 81 MG CHEWABLE TABLET    Take 1 tablet by mouth daily    ATOMOXETINE (STRATTERA) 100 MG CAPSULE    Take 1 capsule by mouth daily    BACLOFEN (LIORESAL) 10 MG TABLET    Take 10 mg by mouth as needed    ELASTIC BANDAGES & SUPPORTS (WRIST SPLINT/COCK-UP/LEFT M) MISC    Wear on left wrist nightly for carpal tunnel. GABAPENTIN (NEURONTIN) 300 MG CAPSULE        LEVOTHYROXINE (SYNTHROID) 175 MCG TABLET    225 mcg    LISINOPRIL (PRINIVIL;ZESTRIL) 10 MG TABLET    Take 1 tablet by mouth 2 times daily    MELOXICAM (MOBIC) 15 MG TABLET    Take 1 tablet by mouth daily    METOPROLOL TARTRATE (LOPRESSOR) 25 MG TABLET    Take 12.5 mg by mouth 2 times daily Takes 1/2 tab (=12.5mg) BID    MYRBETRIQ 50 MG TB24        NICOTINE (NICODERM CQ) 7 MG/24HR    Place 1 patch onto the skin every 24 hours for 14 days    NITROGLYCERIN (NITROSTAT) 0.4 MG SL TABLET    Place 1 tablet under tongue upon chest pain, wait 5 minutes and may repeat up to 3 doses in 15 minutes.  Do not crush or break. If no relief after 1 dose, call 911. ROSUVASTATIN (CRESTOR) 40 MG TABLET    Take 1 tablet by mouth nightly    TICAGRELOR (BRILINTA) 90 MG TABS TABLET    Take 1 tablet by mouth 2 times daily    VALACYCLOVIR (VALTREX) 500 MG TABLET    Take 1 tablet by mouth every evening       ALLERGIES     is allergic to latex, cyclobenzaprine, beef-derived products, clonazepam, clonidine derivatives, and demerol hcl [meperidine]. FAMILY HISTORY     She indicated that her mother is alive. She indicated that her father is . She indicated that both of her sisters are alive. She indicated that two of her three brothers are alive. She indicated that the status of her maternal grandmother is unknown. She indicated that the status of her paternal grandmother is unknown. She indicated that the status of her neg hx is unknown. SOCIAL HISTORY       Social History     Tobacco Use    Smoking status: Former     Packs/day: 0.50     Types: Cigarettes     Quit date: 2020     Years since quitting: 3.0    Smokeless tobacco: Never   Vaping Use    Vaping Use: Former   Substance Use Topics    Alcohol use: Yes     Comment: rare    Drug use: Yes     Types: Marijuana (Weed)     Comment: has a Marijuana medical card, but hasn't done in awhile. PHYSICAL EXAM     INITIAL VITALS: /61   Pulse 67   Temp 98.4 °F (36.9 °C) (Oral)   Resp 21   SpO2 94%    Physical Exam  Vitals and nursing note reviewed. Constitutional:       General: She is not in acute distress. Appearance: She is well-developed. She is not toxic-appearing. HENT:      Head: Normocephalic and atraumatic. Nose: Nose normal.      Mouth/Throat:      Mouth: Mucous membranes are moist.   Eyes:      General: No scleral icterus. Conjunctiva/sclera: Conjunctivae normal.      Pupils: Pupils are equal, round, and reactive to light. Cardiovascular:      Rate and Rhythm: Normal rate and regular rhythm.    Pulmonary:      Effort: Pulmonary effort is normal. No respiratory distress. Musculoskeletal:         General: Normal range of motion. Skin:     General: Skin is warm and dry. Findings: No erythema or rash. Neurological:      Mental Status: She is alert and oriented to person, place, and time. Psychiatric:         Behavior: Behavior normal.       MEDICAL DECISION MAKING / ED COURSE:   Summary of Patient Presentation:      1)  Number and Complexity of Problems  Problem List This Visit:    1. 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 History of urinary retention after anesthesia. Hx of varicose veins     Hyperlipidemia     Insulin resistance 3/28/2017    Neurogenic bladder     NSTEMI (non-ST elevated myocardial infarction) ([facility] 09/06/2018    mild    Retention of urine     Thyroid disease     Tobacco abuse 3/21/2018    Vitamin D deficiency 3/21/2018       2)  Data Reviewed    External Documents Reviewed: Previous ER visits reviewed by myself    Rhythm: normal sinus   Rate: normal  Axis: normal  Conduction: normal  ST Segments: no acute change  T Waves: no acute change  Q Waves: no acute change    Clinical Impression: no acute change, but this is a nonspecific EKG.     3)  Treatment and Disposition  [Patient repeat assessment, Disposition discussion with patient/family, Case discussed with consulting clinician, MIPS, Social determinants of health impacting treatment or disposition, Shared Decision Making, Code Status Discussion:]    EKG appears similar to previous with some nonspecific ST changes. Patient states pain is significantly improved currently but had significantly atypical symptoms previously. 2 troponins are flat. Given patient has had extremely recent within the last week catheterization will discuss with cardiology prior to discharge. Signed out to oncoming physician awaiting chest x-ray and cardiology consult. Heart score 6. DATA FOR LAB AND RADIOLOGY TESTS ORDERED BELOW ARE REVIEWED BY THE ED CLINICIAN:    RADIOLOGY: All x-rays, CT, MRI, and formal ultrasound images (except ED bedside ultrasound) are read by the radiologist, see reports below, unless otherwise noted in MDM or here. Reports below are reviewed by myself. XR CHEST PORTABLE   Final Result   No acute cardiopulmonary abnormality. Note: Study limited mid by patient body habitus and associated   underpenetration. LABS: Lab orders shown below, the results are reviewed by myself, and all abnormals are listed below.   Labs Reviewed   BASIC METABOLIC PANEL - Abnormal; Notable for the following components:       Result Value    Glucose 132 (*)     All other components within normal limits   CBC WITH AUTO DIFFERENTIAL - Abnormal; Notable for the following components:    Lymphocytes 44 (*)     Immature Granulocytes 1 (*)     All other components within normal limits   TROPONIN - Abnormal; Notable for the following components:    Troponin, High Sensitivity 19 (*)     All other components within normal limits   TROPONIN - Abnormal; Notable for the following components:    Troponin, High Sensitivity 18 (*)     All other components within normal limits   BRAIN NATRIURETIC PEPTIDE       Vitals Reviewed:    Vitals:    02/24/23 0446 02/24/23 0500 02/24/23 0645   BP: (!) 148/97 109/62 117/61   Pulse: 72 76 67   Resp: 12 21 21   Temp: 98.4 °F (36.9 °C)     TempSrc: Oral     SpO2: 96% 95% 94%     MEDICATIONS GIVEN TO PATIENT THIS ENCOUNTER:  No orders of the defined types were placed in this encounter. DISCHARGE PRESCRIPTIONS:  New Prescriptions    No medications on file     PHYSICIAN CONSULTS ORDERED THIS ENCOUNTER:  IP CONSULT TO CARDIOLOGY    ED Course Notes From Epic Narrator:  ED Course as of 02/24/23 0842   Fri Feb 24, 2023   0703 Troponin, High Sensitivity(!): 18 [MS]      ED Course User Index  [MS] Chelita Burgess DO         CRITICAL CARE:   0    PROCEDURES:  none    FINAL IMPRESSION      1. Chest pain, unspecified type          DISPOSITION/PLAN   DISPOSITION    Per oncoming physician    OUTPATIENT FOLLOW UP THE PATIENT:  No follow-up provider specified.     DISCHARGE MEDICATIONS:  New Prescriptions    No medications on file       Chelita Burgess DO  EmergencyMedicine Attending    (Please note that portions of this note were completed with a voice recognition program.  Efforts were made to edit the dictations but occasionally words are mis-transcribed.)     Chelita Burgess DO  02/24/23 0416 Abbe Flap (Lower To Upper Lip) Text: The defect of the upper lip was assessed and measured.  Given the location and size of the defect, an Abbe flap was deemed most appropriate.  Using a sterile surgical marker, an appropriate Abbe flap was measured and drawn on the lower lip. Local anesthesia was then infiltrated. A scalpel was then used to incise the upper lip through and through the skin, vermilion, muscle and mucosa, leaving the flap pedicled on the opposite side.  The flap was then rotated and transferred to the lower lip defect.  The flap was then sutured into place with a three layer technique, closing the orbicularis oris muscle layer with subcutaneous buried sutures, followed by a mucosal layer and an epidermal layer.

## 2023-02-24 NOTE — ED PROVIDER NOTES
Medical Decision Making: The patient was initially seen by Dr. Melly Knight and signed out to me after discussing the case with him thoroughly. She remains symptom-free and 2 sets of cardiac markers are negative. I have spoken to Dr. Beard Later and the patient will be discharged home. Treatment diagnosis and follow-up were discussed with the patient. At this point I do not clinically suspect cardiac etiology. Frontal diagnosis: Chest wall pain, MI, unstable angina    CONSULTS:  None    PROCEDURES:  None    FINAL IMPRESSION      1.  Chest pain, unspecified type         DISPOSITION/PLAN   DISPOSITION Decision To Discharge 02/24/2023 08:49:16 AM       PATIENT REFERRED TO:   Dalton Noyola MD  [address]  [phone]      As needed    Leandro Toledo MD  [facility] [address] [address] [phone]    In 1 week        DISCHARGE MEDICATIONS:     New Prescriptions    No medications on file         (Please note that portions of this note were completed with a voice recognition program.  Efforts were made to edit the dictations but occasionally words are mis-transcribed.)    Madhavi Austin MD  Attending Emergency Physician          Madhavi Austin MD  02/24/23 5455

## 2023-02-25 LAB
EKG ATRIAL RATE: 70 BPM
EKG P AXIS: 69 DEGREES
EKG P-R INTERVAL: 166 MS
EKG Q-T INTERVAL: 378 MS
EKG QRS DURATION: 96 MS
EKG QTC CALCULATION (BAZETT): 408 MS
EKG R AXIS: 38 DEGREES
EKG T AXIS: 67 DEGREES
EKG VENTRICULAR RATE: 70 BPM

## 2023-03-21 ENCOUNTER — HOSPITAL ENCOUNTER (OUTPATIENT)
Dept: CARDIAC REHAB | Age: 58
Setting detail: THERAPIES SERIES
Discharge: HOME | End: 2023-03-21
Payer: COMMERCIAL

## 2023-03-21 VITALS
HEART RATE: 74 BPM | WEIGHT: 215 LBS | BODY MASS INDEX: 40.59 KG/M2 | OXYGEN SATURATION: 100 % | RESPIRATION RATE: 18 BRPM | HEIGHT: 61 IN

## 2023-03-21 PROCEDURE — 93798 PHYS/QHP OP CAR RHAB W/ECG: CPT

## 2023-03-21 PROCEDURE — 93797 PHYS/QHP OP CAR RHAB WO ECG: CPT

## 2023-03-21 RX ORDER — PANTOPRAZOLE SODIUM 20 MG/1
20 TABLET, DELAYED RELEASE ORAL DAILY
COMMUNITY

## 2023-03-21 ASSESSMENT — EJECTION FRACTION
EF_VALUE: 55
EF_VALUE: 55

## 2023-03-21 ASSESSMENT — EXERCISE STRESS TEST
PEAK_HR: 95
PEAK_BP: 122/70
PEAK_BP: 122/70
PEAK_RPE: 12
PEAK_BP: 122/70
PEAK_METS: 2.8

## 2023-03-21 ASSESSMENT — PATIENT HEALTH QUESTIONNAIRE - PHQ9
2. FEELING DOWN, DEPRESSED OR HOPELESS: 0
SUM OF ALL RESPONSES TO PHQ QUESTIONS 1-9: 0
1. LITTLE INTEREST OR PLEASURE IN DOING THINGS: 0
SUM OF ALL RESPONSES TO PHQ QUESTIONS 1-9: 0
SUM OF ALL RESPONSES TO PHQ9 QUESTIONS 1 & 2: 0

## 2023-03-21 NOTE — DISCHARGE INSTRUCTIONS
your heart gradually prepare for and recover from exercise and avoid pushing your heart too hard. Stop exercising if you have any unusual discomfort, such as chest pain. Do not smoke. Smoking can make heart problems worse. If you need help quitting, talk to your doctor about stop-smoking programs and medicines. These can increase your chances of quitting for good. When should you call for help? Call 911 anytime you think you may need emergency care. For example, call if:  You have severe trouble breathing. You cough up pink, foamy mucus and you have trouble breathing. You have symptoms of a heart attack. These may include:  Chest pain or pressure, or a strange feeling in the chest.  Sweating. Shortness of breath. Nausea or vomiting. Pain, pressure, or a strange feeling in the back, neck, jaw, or upper belly or in one or both shoulders or arms. Lightheadedness or sudden weakness. A fast or irregular heartbeat. After you call 911, the  may tell you to chew 1 adult-strength or 2 to 4 low-dose aspirin. Wait for an ambulance. Do not try to drive yourself. You have signs of a stroke such as:  Sudden numbness, paralysis, or weakness in your face, arm, or leg, especially on only one side of your body. New problems with walking or balance. Sudden vision changes. Drooling or slurred speech. New problems speaking or understanding simple statements, or feeling confused. A sudden, severe headache that is different from past headaches. You passed out (lost consciousness). Call your doctor now or seek immediate medical care if:  You have new or increased shortness of breath. You are dizzy or lightheaded, or you feel like you may faint. You gain 2 to 3 pounds or more over 2 days. You have increased swelling in your legs, ankles, or feet. Watch closely for changes in your health, and be sure to contact your doctor if you have any problems.     EXERCISE GUIDELINES    If you experience any unusual

## 2023-03-21 NOTE — PLAN OF CARE
Mgmt Techniques Yes   Tobacco   Stages of Change Maintenance   Tobacco Use No   Quit Greater than 6 month   Lipids   Date Lipids Drawn 11/13/21   Total 117   HDL 55   LDL 43   Triglycerides 95   Lipid Med(s) YES   Lipid Med Change(s) No   Weight Management   Weight  215 lb (97.5 kg)   Height  5' 1\" (1.549 m)   BMI 40.71   Nutrition Intervention   Dietitian Consult No   Nurse/Patient Discussion Yes   Nutrition Education   Education Signs/symptoms/treatment of hypo/hyperglycemia; Low saturated fat diet; Low sodium diet; Overall healthy eating pattern that emphasizes vegetables, fruits, wholegrains, healthy proteins and non-tropical oils   Nutrition Target Goals   Target Goals LDL-C less than 70 and non-HDL-C <100 for those with ASCVD;Triglycerides less than 150   Education   Learning Barrier Ready to learn   Cardiac Knowledge Total Score 10   Education Intervention   Education Schedule Given Yes   Patient Education    Education CAD;Risk factors;Med compliance;Cardiac A&P;Signs/Symptoms of angina   Hypertension Yes   Hypertension Controlled Yes   Is BP WDL Yes   Med(s) Change No   BP Meds YES   ACE/ARB Prescribed Yes   ACE/ARB Adherent Yes   ASA Prescribed Yes   ASA Adherent Yes   Antiplatelet Prescribed Yes   Antiplatelet Adherent Yes   BB Prescribed Yes   BB Adherent Yes   Statins Prescribed Yes   Statins Adherent Yes   Education Target Goals   Target Goals Medication compliance; Risk factors; Understand target guidelines for lipids; Understand target guidelines for B/P   Staff Treatment Goals   Goals Functional capacity; Exercise   Exercise Goal INCREASE STRENGTH   Exercise Goal Status Initial   Exercise Goal Assessment Recommendations   Functional Capacity Goal INCREASE MOBILITY   Functional Capacity Goal Status Initial   Functional Capacity Goal Assessment Recommendations

## 2023-03-22 ENCOUNTER — HOSPITAL ENCOUNTER (OUTPATIENT)
Dept: CARDIAC REHAB | Age: 58
Setting detail: THERAPIES SERIES
Discharge: HOME | End: 2023-03-22
Payer: COMMERCIAL

## 2023-03-22 VITALS — WEIGHT: 219.6 LBS | BODY MASS INDEX: 41.49 KG/M2

## 2023-03-22 PROCEDURE — 93797 PHYS/QHP OP CAR RHAB WO ECG: CPT

## 2023-03-22 PROCEDURE — 93798 PHYS/QHP OP CAR RHAB W/ECG: CPT

## 2023-03-22 ASSESSMENT — EXERCISE STRESS TEST
PEAK_METS: 2.3
PEAK_HR: 90
PEAK_RPE: 12
PEAK_BP: 108/67

## 2023-03-24 ENCOUNTER — HOSPITAL ENCOUNTER (OUTPATIENT)
Dept: CARDIAC REHAB | Age: 58
Setting detail: THERAPIES SERIES
Discharge: HOME | End: 2023-03-24
Payer: COMMERCIAL

## 2023-03-24 VITALS — WEIGHT: 220 LBS | BODY MASS INDEX: 41.57 KG/M2

## 2023-03-24 PROCEDURE — 93798 PHYS/QHP OP CAR RHAB W/ECG: CPT

## 2023-03-24 ASSESSMENT — EXERCISE STRESS TEST
PEAK_METS: 2.8
PEAK_BP: 112/62
PEAK_RPE: 13
PEAK_HR: 100

## 2023-03-27 ENCOUNTER — APPOINTMENT (OUTPATIENT)
Dept: CARDIAC REHAB | Age: 58
End: 2023-03-27
Payer: COMMERCIAL

## 2023-03-29 ENCOUNTER — HOSPITAL ENCOUNTER (OUTPATIENT)
Dept: CARDIAC REHAB | Age: 58
Setting detail: THERAPIES SERIES
Discharge: HOME | End: 2023-03-29
Payer: COMMERCIAL

## 2023-03-29 VITALS — BODY MASS INDEX: 41.23 KG/M2 | WEIGHT: 218.2 LBS

## 2023-03-29 PROCEDURE — 93797 PHYS/QHP OP CAR RHAB WO ECG: CPT

## 2023-03-29 PROCEDURE — 93798 PHYS/QHP OP CAR RHAB W/ECG: CPT

## 2023-03-29 ASSESSMENT — EXERCISE STRESS TEST
PEAK_BP: 142/82
PEAK_HR: 92
PEAK_METS: 3.3
PEAK_RPE: 11

## 2023-03-31 ENCOUNTER — HOSPITAL ENCOUNTER (OUTPATIENT)
Dept: CARDIAC REHAB | Age: 58
Setting detail: THERAPIES SERIES
Discharge: HOME | End: 2023-03-31
Payer: COMMERCIAL

## 2023-03-31 VITALS — BODY MASS INDEX: 41.47 KG/M2 | WEIGHT: 219.5 LBS

## 2023-03-31 PROCEDURE — 93798 PHYS/QHP OP CAR RHAB W/ECG: CPT

## 2023-03-31 ASSESSMENT — EXERCISE STRESS TEST
PEAK_METS: 2.9
PEAK_HR: 93
PEAK_RPE: 14
PEAK_BP: 116/68

## 2023-04-03 ENCOUNTER — HOSPITAL ENCOUNTER (OUTPATIENT)
Dept: CARDIAC REHAB | Age: 58
Setting detail: THERAPIES SERIES
Discharge: HOME | End: 2023-04-03
Payer: COMMERCIAL

## 2023-04-05 ENCOUNTER — HOSPITAL ENCOUNTER (OUTPATIENT)
Dept: CARDIAC REHAB | Age: 58
Setting detail: THERAPIES SERIES
Discharge: HOME | End: 2023-04-05
Payer: COMMERCIAL

## 2023-04-05 VITALS — WEIGHT: 220 LBS | BODY MASS INDEX: 41.57 KG/M2

## 2023-04-05 PROCEDURE — 93798 PHYS/QHP OP CAR RHAB W/ECG: CPT

## 2023-04-05 ASSESSMENT — EXERCISE STRESS TEST
PEAK_METS: 2.5
PEAK_BP: 116/80
PEAK_RPE: 13

## 2023-04-14 ENCOUNTER — HOSPITAL ENCOUNTER (OUTPATIENT)
Dept: CARDIAC REHAB | Age: 58
Setting detail: THERAPIES SERIES
Discharge: HOME | End: 2023-04-14
Payer: COMMERCIAL

## 2023-04-17 ENCOUNTER — HOSPITAL ENCOUNTER (OUTPATIENT)
Dept: CARDIAC REHAB | Age: 58
Setting detail: THERAPIES SERIES
Discharge: HOME | End: 2023-04-17
Payer: COMMERCIAL

## 2023-04-17 VITALS — BODY MASS INDEX: 41.76 KG/M2 | WEIGHT: 221 LBS

## 2023-04-17 PROCEDURE — 93798 PHYS/QHP OP CAR RHAB W/ECG: CPT

## 2023-04-17 ASSESSMENT — EXERCISE STRESS TEST
PEAK_HR: 92
PEAK_BP: 130/74
PEAK_METS: 3.4
PEAK_BP: 130/74
PEAK_RPE: 11

## 2023-04-17 ASSESSMENT — EJECTION FRACTION: EF_VALUE: 55

## 2023-04-17 NOTE — FLOWSHEET NOTE
Assessment Recommendations   Functional Capacity Goal INCREASE MOBILITY   Functional Capacity Goal Status Progressing   Functional Capacity Goal Comments PT STATES GETTING STRONGER AND IS ABLE TO MOVE AROUND MORE BUT CONTINUES TO WORK ON THESE GOALS   Functional Capacity Goal Assessment Recommendations

## 2023-04-19 ENCOUNTER — HOSPITAL ENCOUNTER (OUTPATIENT)
Dept: CARDIAC REHAB | Age: 58
Setting detail: THERAPIES SERIES
Discharge: HOME | End: 2023-04-19
Payer: COMMERCIAL

## 2023-04-19 VITALS — BODY MASS INDEX: 42.25 KG/M2 | WEIGHT: 223.6 LBS

## 2023-04-19 PROCEDURE — 93797 PHYS/QHP OP CAR RHAB WO ECG: CPT

## 2023-04-19 PROCEDURE — 93798 PHYS/QHP OP CAR RHAB W/ECG: CPT

## 2023-04-19 ASSESSMENT — EXERCISE STRESS TEST
PEAK_HR: 84
PEAK_METS: 3.4
PEAK_RPE: 12
PEAK_BP: 128/64

## 2023-04-24 ENCOUNTER — HOSPITAL ENCOUNTER (OUTPATIENT)
Dept: CARDIAC REHAB | Age: 58
Setting detail: THERAPIES SERIES
Discharge: HOME | End: 2023-04-24
Payer: COMMERCIAL

## 2023-04-26 ENCOUNTER — HOSPITAL ENCOUNTER (OUTPATIENT)
Dept: CARDIAC REHAB | Age: 58
Setting detail: THERAPIES SERIES
End: 2023-04-26
Payer: COMMERCIAL

## 2023-04-28 ENCOUNTER — HOSPITAL ENCOUNTER (OUTPATIENT)
Dept: CARDIAC REHAB | Age: 58
Setting detail: THERAPIES SERIES
Discharge: HOME | End: 2023-04-28
Payer: COMMERCIAL

## 2023-05-01 ENCOUNTER — HOSPITAL ENCOUNTER (OUTPATIENT)
Age: 58
Discharge: HOME | End: 2023-05-01
Payer: COMMERCIAL

## 2023-05-01 ENCOUNTER — HOSPITAL ENCOUNTER (OUTPATIENT)
Dept: CARDIAC REHAB | Age: 58
Setting detail: THERAPIES SERIES
Discharge: HOME | End: 2023-05-01
Payer: COMMERCIAL

## 2023-05-01 VITALS — WEIGHT: 220 LBS | BODY MASS INDEX: 41.57 KG/M2

## 2023-05-01 LAB
ALT SERPL-CCNC: 30 U/L (ref 5–33)
AST SERPL-CCNC: 23 U/L
CHOLEST SERPL-MCNC: 130 MG/DL
CHOLESTEROL/HDL RATIO: 2.3
HDLC SERPL-MCNC: 56 MG/DL
LDLC SERPL CALC-MCNC: 45 MG/DL (ref 0–130)
TRIGL SERPL-MCNC: 145 MG/DL

## 2023-05-01 PROCEDURE — 84450 TRANSFERASE (AST) (SGOT): CPT

## 2023-05-01 PROCEDURE — 93798 PHYS/QHP OP CAR RHAB W/ECG: CPT

## 2023-05-01 PROCEDURE — 84460 ALANINE AMINO (ALT) (SGPT): CPT

## 2023-05-01 PROCEDURE — 36415 COLL VENOUS BLD VENIPUNCTURE: CPT

## 2023-05-01 PROCEDURE — 80061 LIPID PANEL: CPT

## 2023-05-01 ASSESSMENT — EXERCISE STRESS TEST
PEAK_HR: 112
PEAK_RPE: 10
PEAK_METS: 3.5
PEAK_BP: 110/70

## 2023-05-05 ENCOUNTER — HOSPITAL ENCOUNTER (OUTPATIENT)
Dept: CARDIAC REHAB | Age: 58
Setting detail: THERAPIES SERIES
Discharge: HOME | End: 2023-05-05
Payer: COMMERCIAL

## 2023-05-05 VITALS — BODY MASS INDEX: 42.97 KG/M2 | WEIGHT: 227.4 LBS

## 2023-05-05 PROCEDURE — 93798 PHYS/QHP OP CAR RHAB W/ECG: CPT

## 2023-05-05 ASSESSMENT — EXERCISE STRESS TEST
PEAK_METS: 2.9
PEAK_HR: 98
PEAK_RPE: 10
PEAK_BP: 118/80

## 2023-05-08 ENCOUNTER — APPOINTMENT (OUTPATIENT)
Dept: CARDIAC REHAB | Age: 58
End: 2023-05-08
Payer: COMMERCIAL

## 2023-05-10 ENCOUNTER — HOSPITAL ENCOUNTER (OUTPATIENT)
Dept: CARDIAC REHAB | Age: 58
Setting detail: THERAPIES SERIES
Discharge: HOME | End: 2023-05-10
Payer: COMMERCIAL

## 2023-05-10 VITALS — WEIGHT: 227 LBS | BODY MASS INDEX: 42.89 KG/M2

## 2023-05-10 PROCEDURE — 93798 PHYS/QHP OP CAR RHAB W/ECG: CPT

## 2023-05-10 ASSESSMENT — EXERCISE STRESS TEST
PEAK_RPE: 10
PEAK_METS: 2.9
PEAK_HR: 86
PEAK_BP: 138/82

## 2023-05-12 ENCOUNTER — HOSPITAL ENCOUNTER (OUTPATIENT)
Dept: CARDIAC REHAB | Age: 58
Setting detail: THERAPIES SERIES
Discharge: HOME | End: 2023-05-12
Payer: COMMERCIAL

## 2023-05-15 ENCOUNTER — HOSPITAL ENCOUNTER (OUTPATIENT)
Dept: CARDIAC REHAB | Age: 58
Setting detail: THERAPIES SERIES
Discharge: HOME | End: 2023-05-15
Payer: COMMERCIAL

## 2023-05-17 ENCOUNTER — HOSPITAL ENCOUNTER (OUTPATIENT)
Dept: CARDIAC REHAB | Age: 58
Setting detail: THERAPIES SERIES
Discharge: HOME | End: 2023-05-17
Payer: COMMERCIAL

## 2023-05-22 ENCOUNTER — HOSPITAL ENCOUNTER (OUTPATIENT)
Dept: CARDIAC REHAB | Age: 58
Setting detail: THERAPIES SERIES
Discharge: HOME | End: 2023-05-22
Payer: COMMERCIAL

## 2023-05-22 ENCOUNTER — APPOINTMENT (OUTPATIENT)
Dept: CARDIAC REHAB | Age: 58
End: 2023-05-22
Payer: COMMERCIAL

## 2023-05-24 ENCOUNTER — HOSPITAL ENCOUNTER (EMERGENCY)
Age: 58
Discharge: HOME | End: 2023-05-24
Attending: EMERGENCY MEDICINE
Payer: COMMERCIAL

## 2023-05-24 ENCOUNTER — APPOINTMENT (OUTPATIENT)
Dept: CT IMAGING | Age: 58
End: 2023-05-24
Payer: COMMERCIAL

## 2023-05-24 ENCOUNTER — HOSPITAL ENCOUNTER (OUTPATIENT)
Dept: CARDIAC REHAB | Age: 58
Setting detail: THERAPIES SERIES
Discharge: HOME | End: 2023-05-24
Payer: COMMERCIAL

## 2023-05-24 ENCOUNTER — APPOINTMENT (OUTPATIENT)
Dept: CARDIAC REHAB | Age: 58
End: 2023-05-24
Payer: COMMERCIAL

## 2023-05-24 VITALS
WEIGHT: 218 LBS | TEMPERATURE: 97.8 F | OXYGEN SATURATION: 97 % | RESPIRATION RATE: 16 BRPM | HEART RATE: 71 BPM | DIASTOLIC BLOOD PRESSURE: 53 MMHG | BODY MASS INDEX: 41.19 KG/M2 | SYSTOLIC BLOOD PRESSURE: 114 MMHG

## 2023-05-24 DIAGNOSIS — R19.7 NAUSEA VOMITING AND DIARRHEA: Primary | ICD-10-CM

## 2023-05-24 DIAGNOSIS — R11.2 NAUSEA VOMITING AND DIARRHEA: Primary | ICD-10-CM

## 2023-05-24 LAB
ALBUMIN SERPL-MCNC: 4.3 G/DL (ref 3.5–5.2)
ALP SERPL-CCNC: 107 U/L (ref 35–104)
ALT SERPL-CCNC: 25 U/L (ref 5–33)
ANION GAP SERPL CALCULATED.3IONS-SCNC: 13 MMOL/L (ref 9–17)
AST SERPL-CCNC: 18 U/L
BASOPHILS # BLD: 0.04 K/UL (ref 0–0.2)
BASOPHILS NFR BLD: 1 % (ref 0–2)
BILIRUB DIRECT SERPL-MCNC: 0.2 MG/DL
BILIRUB INDIRECT SERPL-MCNC: 0.3 MG/DL (ref 0–1)
BILIRUB SERPL-MCNC: 0.5 MG/DL (ref 0.3–1.2)
BILIRUB UR QL STRIP: NEGATIVE
BUN SERPL-MCNC: 14 MG/DL (ref 6–20)
BUN/CREAT SERPL: 17 (ref 9–20)
CALCIUM SERPL-MCNC: 9.6 MG/DL (ref 8.6–10.4)
CHLORIDE SERPL-SCNC: 106 MMOL/L (ref 98–107)
CLARITY UR: CLEAR
CO2 SERPL-SCNC: 21 MMOL/L (ref 20–31)
COLOR UR: YELLOW
CREAT SERPL-MCNC: 0.83 MG/DL (ref 0.5–0.9)
EOSINOPHIL # BLD: 0.11 K/UL (ref 0–0.44)
EOSINOPHILS RELATIVE PERCENT: 2 % (ref 1–4)
EPI CELLS #/AREA URNS HPF: ABNORMAL /HPF (ref 0–5)
ERYTHROCYTE [DISTWIDTH] IN BLOOD BY AUTOMATED COUNT: 14 % (ref 11.8–14.4)
GFR SERPL CREATININE-BSD FRML MDRD: >60 ML/MIN/1.73M2
GLUCOSE SERPL-MCNC: 176 MG/DL (ref 70–99)
GLUCOSE UR STRIP-MCNC: NEGATIVE MG/DL
HCT VFR BLD AUTO: 41.5 % (ref 36.3–47.1)
HGB BLD-MCNC: 13.8 G/DL (ref 11.9–15.1)
HGB UR QL STRIP.AUTO: NEGATIVE
IMM GRANULOCYTES # BLD AUTO: 0.04 K/UL (ref 0–0.3)
IMM GRANULOCYTES NFR BLD: 1 %
KETONES UR STRIP-MCNC: NEGATIVE MG/DL
LEUKOCYTE ESTERASE UR QL STRIP: NEGATIVE
LYMPHOCYTES # BLD: 34 % (ref 24–43)
LYMPHOCYTES NFR BLD: 2.31 K/UL (ref 1.1–3.7)
MCH RBC QN AUTO: 32.1 PG (ref 25.2–33.5)
MCHC RBC AUTO-ENTMCNC: 33.3 G/DL (ref 28.4–34.8)
MCV RBC AUTO: 96.5 FL (ref 82.6–102.9)
MONOCYTES NFR BLD: 0.51 K/UL (ref 0.1–1.2)
MONOCYTES NFR BLD: 8 % (ref 3–12)
NEUTROPHILS NFR BLD: 54 % (ref 36–65)
NEUTS SEG NFR BLD: 3.7 K/UL (ref 1.5–8.1)
NITRITE UR QL STRIP: NEGATIVE
NRBC AUTOMATED: 0 PER 100 WBC
PH UR STRIP: 5.5 [PH] (ref 5–8)
PLATELET # BLD AUTO: 197 K/UL (ref 138–453)
PMV BLD AUTO: 10.5 FL (ref 8.1–13.5)
POTASSIUM SERPL-SCNC: 4.2 MMOL/L (ref 3.7–5.3)
PROT SERPL-MCNC: 7.1 G/DL (ref 6.4–8.3)
PROT UR STRIP-MCNC: ABNORMAL MG/DL
RBC # BLD AUTO: 4.3 M/UL (ref 3.95–5.11)
RBC #/AREA URNS HPF: ABNORMAL /HPF (ref 0–2)
REASON FOR REJECTION: NORMAL
SODIUM SERPL-SCNC: 140 MMOL/L (ref 135–144)
SP GR UR STRIP: 1.02 (ref 1–1.03)
SPECIMEN SOURCE: NORMAL
UROBILINOGEN UR STRIP-ACNC: NORMAL
WBC #/AREA URNS HPF: ABNORMAL /HPF (ref 0–5)
WBC OTHER # BLD: 6.7 K/UL (ref 3.5–11.3)
ZZ NTE CLEAN UP: ORDERED TEST: NORMAL

## 2023-05-24 PROCEDURE — 81001 URINALYSIS AUTO W/SCOPE: CPT

## 2023-05-24 PROCEDURE — 74177 CT ABD & PELVIS W/CONTRAST: CPT

## 2023-05-24 PROCEDURE — 80076 HEPATIC FUNCTION PANEL: CPT

## 2023-05-24 PROCEDURE — 6360000004 HC RX CONTRAST MEDICATION: Performed by: NURSE PRACTITIONER

## 2023-05-24 PROCEDURE — 99285 EMERGENCY DEPT VISIT HI MDM: CPT

## 2023-05-24 PROCEDURE — 2580000003 HC RX 258: Performed by: NURSE PRACTITIONER

## 2023-05-24 PROCEDURE — 80048 BASIC METABOLIC PNL TOTAL CA: CPT

## 2023-05-24 PROCEDURE — 85025 COMPLETE CBC W/AUTO DIFF WBC: CPT

## 2023-05-24 RX ORDER — DICYCLOMINE HYDROCHLORIDE 10 MG/1
10 CAPSULE ORAL 3 TIMES DAILY PRN
Qty: 10 CAPSULE | Refills: 0 | Status: SHIPPED | OUTPATIENT
Start: 2023-05-24

## 2023-05-24 RX ORDER — SODIUM CHLORIDE 9 MG/ML
INJECTION, SOLUTION INTRAVENOUS CONTINUOUS
Status: DISCONTINUED | OUTPATIENT
Start: 2023-05-24 | End: 2023-05-24 | Stop reason: HOSPADM

## 2023-05-24 RX ORDER — ONDANSETRON 4 MG/1
4 TABLET, ORALLY DISINTEGRATING ORAL EVERY 8 HOURS PRN
Qty: 10 TABLET | Refills: 0 | Status: SHIPPED | OUTPATIENT
Start: 2023-05-24

## 2023-05-24 RX ORDER — PROMETHAZINE HYDROCHLORIDE 25 MG/1
25 TABLET ORAL EVERY 8 HOURS PRN
Qty: 10 TABLET | Refills: 0 | Status: SHIPPED | OUTPATIENT
Start: 2023-05-24 | End: 2023-05-31

## 2023-05-24 RX ORDER — 0.9 % SODIUM CHLORIDE 0.9 %
100 INTRAVENOUS SOLUTION INTRAVENOUS ONCE
Status: COMPLETED | OUTPATIENT
Start: 2023-05-24 | End: 2023-05-24

## 2023-05-24 RX ORDER — SODIUM CHLORIDE 0.9 % (FLUSH) 0.9 %
10 SYRINGE (ML) INJECTION PRN
Status: DISCONTINUED | OUTPATIENT
Start: 2023-05-24 | End: 2023-05-24 | Stop reason: HOSPADM

## 2023-05-24 RX ADMIN — SODIUM CHLORIDE, PRESERVATIVE FREE 10 ML: 5 INJECTION INTRAVENOUS at 12:39

## 2023-05-24 RX ADMIN — IOPAMIDOL 75 ML: 755 INJECTION, SOLUTION INTRAVENOUS at 12:39

## 2023-05-24 RX ADMIN — SODIUM CHLORIDE: 9 INJECTION, SOLUTION INTRAVENOUS at 11:40

## 2023-05-24 RX ADMIN — SODIUM CHLORIDE 80 ML: 9 INJECTION, SOLUTION INTRAVENOUS at 12:40

## 2023-05-24 ASSESSMENT — ENCOUNTER SYMPTOMS
BACK PAIN: 0
ABDOMINAL PAIN: 1
NAUSEA: 0
COUGH: 0
SORE THROAT: 0
COLOR CHANGE: 0
DIARRHEA: 1
SHORTNESS OF BREATH: 0

## 2023-05-24 ASSESSMENT — PAIN DESCRIPTION - DESCRIPTORS: DESCRIPTORS: TENDER

## 2023-05-24 ASSESSMENT — PAIN DESCRIPTION - FREQUENCY: FREQUENCY: CONTINUOUS

## 2023-05-24 ASSESSMENT — PAIN DESCRIPTION - LOCATION: LOCATION: ABDOMEN;FLANK;BACK

## 2023-05-24 ASSESSMENT — PAIN SCALES - GENERAL: PAINLEVEL_OUTOF10: 10

## 2023-05-24 ASSESSMENT — PAIN - FUNCTIONAL ASSESSMENT: PAIN_FUNCTIONAL_ASSESSMENT: 0-10

## 2023-05-24 ASSESSMENT — PAIN DESCRIPTION - ORIENTATION: ORIENTATION: RIGHT

## 2023-05-24 NOTE — PROGRESS NOTES
Pt called and left message cancelling cardiac rehab appointments for today and Friday. Apts rescheduled.

## 2023-05-24 NOTE — ED PROVIDER NOTES
[facility] ED  eMERGENCY dEPARTMENT eNCOUnter      Pt Name: Beatriz Charles  MRN: [id number]  [place] 1965  Date of evaluation: 5/24/2023  Provider: MARU Rivera 6069       Chief Complaint   Patient presents with    Diarrhea     Onset 1 week, states on a bowel management program         HISTORY OF PRESENT ILLNESS  (Location/Symptom, Timing/Onset, Context/Setting, Quality, Duration, Modifying Factors, Severity.)   Beatriz Charles is a 56 y.o. female who presents to the emergency department. C/o diarrhea for the past week. Denies fever, chills, weakness, N/V, urinary sx. Reports low abd and back pain. Reports she is in a bowel management program; she has to give herself regular enemas. She is concerned about an obstruction today. Rates her pain 10/10 at this time. Nursing Notes were reviewed. ALLERGIES     Latex, Cyclobenzaprine, Beef-derived products, Clonazepam, Clonidine derivatives, and Demerol hcl [meperidine]    CURRENT MEDICATIONS       Previous Medications    ASPIRIN 81 MG CHEWABLE TABLET    Take 1 tablet by mouth daily    ATOMOXETINE (STRATTERA) 100 MG CAPSULE    Take 1 capsule by mouth daily    BACLOFEN (LIORESAL) 10 MG TABLET    Take 10 mg by mouth as needed    ELASTIC BANDAGES & SUPPORTS (WRIST SPLINT/COCK-UP/LEFT M) MISC    Wear on left wrist nightly for carpal tunnel.     GABAPENTIN (NEURONTIN) 300 MG CAPSULE        LEVOTHYROXINE (SYNTHROID) 175 MCG TABLET    225 mcg Daily    LISINOPRIL (PRINIVIL;ZESTRIL) 10 MG TABLET    Take 1 tablet by mouth 2 times daily    MELOXICAM (MOBIC) 15 MG TABLET    Take 1 tablet by mouth daily    METOPROLOL TARTRATE (LOPRESSOR) 25 MG TABLET    Take 12.5 mg by mouth 2 times daily Takes 1/2 tab (=12.5mg) BID    MYRBETRIQ 50 MG TB24    Take 50 mg by mouth daily    NICOTINE (NICODERM CQ) 7 MG/24HR    Place 1 patch onto the skin every 24 hours for 14 days    NITROGLYCERIN (NITROSTAT) 0.4 MG SL TABLET    Place 1 tablet

## 2023-05-24 NOTE — ED NOTES
Patient kept occluding IV in left AC and was c/o it \"pinching\" and not being able to do anything with arm. Writer placed new PIV in right forearm and moved IV to same.       Casimiro Ring., RN  28/33/53 [id number]

## 2023-05-24 NOTE — ED NOTES
Per Dr. Elif Hidalgo brought patient a sandwich and some water to see if she can tolerate PO intake.       Lenny Corea, RN  91/74/06 55

## 2023-05-24 NOTE — ED PROVIDER NOTES
[address]    Pt Name: Jean Claude Pugh  MRN: [id number]  [place] 1965  Date of evaluation: 5/24/23    Jean Claude Pugh is a 56 y.o. female with CC: Diarrhea (Onset 1 week, states on a bowel management program)      MDM:   57-year-old female presents emergency department with 1 week of nausea vomiting diarrhea. No blood out either end. Has not been around anyone similar symptoms. No recent travel, no recent antibiotics. She does follow with [facility] [place] for her bowel management as she was born with a congenital anomaly. Initial evaluation when I saw her she is sitting in bed. Is already finished a turkey sandwich and currently eating Jell-O. Has not any episodes of emesis or diarrhea while in the emergency department. Her liver, pancreatic, renal function all within normal limits. No electrolyte disturbances. No urinary tract infection on UA. CT of the abdomen pelvis negative. Likely viral gastroenteritis at this time as she is tolerating p.o. without any significant lab or imaging abnormalities. Discharged home on Zofran, Phenergan, Bentyl.     Vitals:    05/24/23 1043 05/24/23 1046   BP:  (!) 114/53   Pulse: 71    Resp: 16    Temp:  97.8 °F (36.6 °C)   TempSrc: Oral Oral   SpO2: 97%    Weight:  218 lb (98.9 kg)         ED Course as of 05/24/23 1554   Wed May 24, 2023   1453 CT ABDOMEN PELVIS W IV CONTRAST Additional Contrast? None [AO]   1502 Urinalysis with Microscopic(!):    Color, UA Yellow   Turbidity UA Clear   Glucose, UA NEGATIVE   Bilirubin, Urine NEGATIVE   Ketones, Urine NEGATIVE   Specific Gravity, UA 1.020   Urine Hgb NEGATIVE   pH, UA 5.5   Protein, UA TRACE(!)   Urobilinogen, Urine Normal   Nitrite, Urine NEGATIVE   Leukocyte Esterase, Urine NEGATIVE   WBC, UA 0 TO 2   RBC, UA 0 TO 2   Epithelial Cells, UA 2 TO 5 [AO]      ED Course User Index  [AO] Hilary Shetty DO       CRITICAL CARE:   NONE    EKG: All EKG's are

## 2023-05-24 NOTE — DISCHARGE INSTR - COC
Continuity of Care Form    Patient Name: Roscoe Olmedo   :  1965  MRN:  [id number]    Admit date:  2023  Discharge date:  ***    Code Status Order: Prior   Advance Directives:     Admitting Physician:  No admitting provider for patient encounter. PCP: Néstor Escalante MD    Discharging Nurse: [facility] Unit/Room#: STA17/17  Discharging Unit Phone Number: ***    Emergency Contact:   Extended Emergency Contact Information  Primary Emergency Contact: Pratik Decker [address] Phone: [phone]  Work Phone: [phone]  Mobile Phone: [phone]  Relation: Brother/Sister  Secondary Emergency Contact: radha batres  Home Phone: [phone]  Relation: Brother/Sister    Past Surgical History:  Past Surgical History:   Procedure Laterality Date    ANKLE FRACTURE SURGERY Left     then hardware removed in     ANUS SURGERY  1972    anal pullthrough    ARTHROPLASTY Right 2019    ARTHROPLASTY RIGHT 2ND DIGIT WITH WEIL OSTEOTOMY RIGHT performed by Tracie Griffin DPM at [address] [place] [address]  2018    STENT X2  -  Kitty Howard ([facility]) 3T Safe Immed.     CORONARY ANGIOPLASTY WITH STENT PLACEMENT  2023    KNEE ARTHROSCOPY Left , ,     meniscal tear, lateral release, scraped arthritis    OVARY REMOVAL Left     mass on ovary    SPINE SURGERY  untethered with laminectomy ,     [facility]       Immunization History:   Immunization History   Administered Date(s) Administered    COVID-19, J&J, (age 18y+), IM, 0.5 mL 2021    COVID-19, MODERNA BLUE border, Primary or Immunocompromised, (age 12y+), IM, 100 mcg/0.5mL 10/28/2021    Influenza Virus Vaccine 2014, 2015    Influenza, FLUARIX, FLULAVAL, FLUZONE (age 10 mo+) AND AFLURIA, (age 1 y+), PF, 0.5mL 10/29/2017, 2019, 2020, 11/10/2021    Influenza, Intradermal, Preservative free 2019    Pneumococcal, PPSV23, PNEUMOVAX 21, (age

## 2023-05-26 ENCOUNTER — APPOINTMENT (OUTPATIENT)
Dept: CARDIAC REHAB | Age: 58
End: 2023-05-26
Payer: COMMERCIAL

## 2023-05-31 ENCOUNTER — HOSPITAL ENCOUNTER (OUTPATIENT)
Dept: CARDIAC REHAB | Age: 58
Setting detail: THERAPIES SERIES
Discharge: HOME | End: 2023-05-31
Payer: COMMERCIAL

## 2023-05-31 ENCOUNTER — APPOINTMENT (OUTPATIENT)
Dept: CARDIAC REHAB | Age: 58
End: 2023-05-31
Payer: COMMERCIAL

## 2023-06-02 ENCOUNTER — HOSPITAL ENCOUNTER (OUTPATIENT)
Dept: CARDIAC REHAB | Age: 58
Setting detail: THERAPIES SERIES
Discharge: HOME | End: 2023-06-02

## 2023-06-07 ENCOUNTER — HOSPITAL ENCOUNTER (OUTPATIENT)
Age: 58
Setting detail: SPECIMEN
Discharge: HOME | End: 2023-06-07

## 2023-06-07 LAB
ANION GAP SERPL CALCULATED.3IONS-SCNC: 13 MMOL/L (ref 9–17)
BUN SERPL-MCNC: 10 MG/DL (ref 6–20)
CALCIUM SERPL-MCNC: 10.1 MG/DL (ref 8.6–10.4)
CHLORIDE SERPL-SCNC: 105 MMOL/L (ref 98–107)
CO2 SERPL-SCNC: 23 MMOL/L (ref 20–31)
CREAT SERPL-MCNC: 0.74 MG/DL (ref 0.5–0.9)
GFR SERPL CREATININE-BSD FRML MDRD: >60 ML/MIN/1.73M2
GLUCOSE SERPL-MCNC: 129 MG/DL (ref 70–99)
POTASSIUM SERPL-SCNC: 4.8 MMOL/L (ref 3.7–5.3)
SODIUM SERPL-SCNC: 141 MMOL/L (ref 135–144)
T4 FREE SERPL-MCNC: 2 NG/DL (ref 0.9–1.7)
TSH SERPL-MCNC: 0.11 UIU/ML (ref 0.3–5)

## 2024-08-12 ENCOUNTER — APPOINTMENT (OUTPATIENT)
Dept: GENERAL RADIOLOGY | Age: 59
End: 2024-08-12
Payer: COMMERCIAL

## 2024-08-12 ENCOUNTER — HOSPITAL ENCOUNTER (EMERGENCY)
Age: 59
Discharge: HOME | End: 2024-08-12
Attending: EMERGENCY MEDICINE
Payer: COMMERCIAL

## 2024-08-12 VITALS
BODY MASS INDEX: 40.15 KG/M2 | OXYGEN SATURATION: 90 % | DIASTOLIC BLOOD PRESSURE: 59 MMHG | TEMPERATURE: 97.8 F | RESPIRATION RATE: 14 BRPM | SYSTOLIC BLOOD PRESSURE: 105 MMHG | HEART RATE: 74 BPM | WEIGHT: 216 LBS

## 2024-08-12 DIAGNOSIS — N39.0 URINARY TRACT INFECTION WITHOUT HEMATURIA, SITE UNSPECIFIED: ICD-10-CM

## 2024-08-12 DIAGNOSIS — R53.81 MALAISE: Primary | ICD-10-CM

## 2024-08-12 LAB
ALBUMIN SERPL-MCNC: 4.7 G/DL (ref 3.5–5.2)
ALP SERPL-CCNC: 103 U/L (ref 35–104)
ALT SERPL-CCNC: 14 U/L (ref 5–33)
ANION GAP SERPL CALCULATED.3IONS-SCNC: 15 MMOL/L (ref 9–17)
AST SERPL-CCNC: 12 U/L
BACTERIA URNS QL MICRO: ABNORMAL
BASOPHILS # BLD: 0.04 K/UL (ref 0–0.2)
BASOPHILS NFR BLD: 0 % (ref 0–2)
BILIRUB SERPL-MCNC: 0.6 MG/DL (ref 0.3–1.2)
BILIRUB UR QL STRIP: NEGATIVE
BUN SERPL-MCNC: 29 MG/DL (ref 6–20)
BUN/CREAT SERPL: 24 (ref 9–20)
CALCIUM SERPL-MCNC: 9.8 MG/DL (ref 8.6–10.4)
CHLORIDE SERPL-SCNC: 99 MMOL/L (ref 98–107)
CLARITY UR: ABNORMAL
CO2 SERPL-SCNC: 22 MMOL/L (ref 20–31)
COLOR UR: YELLOW
CREAT SERPL-MCNC: 1.2 MG/DL (ref 0.5–0.9)
EOSINOPHIL # BLD: 0.12 K/UL (ref 0–0.44)
EOSINOPHILS RELATIVE PERCENT: 1 % (ref 1–4)
EPI CELLS #/AREA URNS HPF: ABNORMAL /HPF (ref 0–5)
ERYTHROCYTE [DISTWIDTH] IN BLOOD BY AUTOMATED COUNT: 14.9 % (ref 11.8–14.4)
GFR, ESTIMATED: 53 ML/MIN/1.73M2
GLUCOSE BLD-MCNC: 156 MG/DL (ref 65–105)
GLUCOSE SERPL-MCNC: 134 MG/DL (ref 70–99)
GLUCOSE UR STRIP-MCNC: NEGATIVE MG/DL
HCT VFR BLD AUTO: 48.2 % (ref 36.3–47.1)
HGB BLD-MCNC: 15.9 G/DL (ref 11.9–15.1)
HGB UR QL STRIP.AUTO: NEGATIVE
IMM GRANULOCYTES # BLD AUTO: 0.26 K/UL (ref 0–0.3)
IMM GRANULOCYTES NFR BLD: 2 %
KETONES UR STRIP-MCNC: NEGATIVE MG/DL
LEUKOCYTE ESTERASE UR QL STRIP: ABNORMAL
LYMPHOCYTES NFR BLD: 3.6 K/UL (ref 1.1–3.7)
LYMPHOCYTES RELATIVE PERCENT: 25 % (ref 24–43)
MCH RBC QN AUTO: 31.6 PG (ref 25.2–33.5)
MCHC RBC AUTO-ENTMCNC: 33 G/DL (ref 28.4–34.8)
MCV RBC AUTO: 95.8 FL (ref 82.6–102.9)
MONOCYTES NFR BLD: 0.82 K/UL (ref 0.1–1.2)
MONOCYTES NFR BLD: 6 % (ref 3–12)
NEUTROPHILS NFR BLD: 66 % (ref 36–65)
NEUTS SEG NFR BLD: 9.43 K/UL (ref 1.5–8.1)
NITRITE UR QL STRIP: POSITIVE
NRBC BLD-RTO: 0 PER 100 WBC
PH UR STRIP: 5.5 [PH] (ref 5–8)
PLATELET # BLD AUTO: 331 K/UL (ref 138–453)
PMV BLD AUTO: 10.3 FL (ref 8.1–13.5)
POTASSIUM SERPL-SCNC: 4.3 MMOL/L (ref 3.7–5.3)
PROT SERPL-MCNC: 8.1 G/DL (ref 6.4–8.3)
PROT UR STRIP-MCNC: NEGATIVE MG/DL
RBC # BLD AUTO: 5.03 M/UL (ref 3.95–5.11)
RBC # BLD: ABNORMAL 10*6/UL
RBC #/AREA URNS HPF: ABNORMAL /HPF (ref 0–2)
SODIUM SERPL-SCNC: 136 MMOL/L (ref 135–144)
SP GR UR STRIP: 1.02 (ref 1–1.03)
TROPONIN I SERPL HS-MCNC: 12 NG/L (ref 0–14)
UROBILINOGEN UR STRIP-ACNC: NORMAL EU/DL (ref 0–1)
WBC #/AREA URNS HPF: ABNORMAL /HPF (ref 0–5)
WBC OTHER # BLD: 14.3 K/UL (ref 3.5–11.3)

## 2024-08-12 PROCEDURE — 99285 EMERGENCY DEPT VISIT HI MDM: CPT

## 2024-08-12 PROCEDURE — 87077 CULTURE AEROBIC IDENTIFY: CPT

## 2024-08-12 PROCEDURE — 96374 THER/PROPH/DIAG INJ IV PUSH: CPT

## 2024-08-12 PROCEDURE — 2580000003 HC RX 258: Performed by: EMERGENCY MEDICINE

## 2024-08-12 PROCEDURE — 87086 URINE CULTURE/COLONY COUNT: CPT

## 2024-08-12 PROCEDURE — 84484 ASSAY OF TROPONIN QUANT: CPT

## 2024-08-12 PROCEDURE — 82947 ASSAY GLUCOSE BLOOD QUANT: CPT

## 2024-08-12 PROCEDURE — 6370000000 HC RX 637 (ALT 250 FOR IP): Performed by: EMERGENCY MEDICINE

## 2024-08-12 PROCEDURE — 85025 COMPLETE CBC W/AUTO DIFF WBC: CPT

## 2024-08-12 PROCEDURE — 93005 ELECTROCARDIOGRAM TRACING: CPT | Performed by: EMERGENCY MEDICINE

## 2024-08-12 PROCEDURE — 71045 X-RAY EXAM CHEST 1 VIEW: CPT

## 2024-08-12 PROCEDURE — 80053 COMPREHEN METABOLIC PANEL: CPT

## 2024-08-12 PROCEDURE — 87186 SC STD MICRODIL/AGAR DIL: CPT

## 2024-08-12 PROCEDURE — 6360000002 HC RX W HCPCS: Performed by: EMERGENCY MEDICINE

## 2024-08-12 PROCEDURE — 81001 URINALYSIS AUTO W/SCOPE: CPT

## 2024-08-12 RX ORDER — LORAZEPAM 2 MG/ML
1 INJECTION INTRAMUSCULAR ONCE
Status: COMPLETED | OUTPATIENT
Start: 2024-08-12 | End: 2024-08-12

## 2024-08-12 RX ORDER — CEPHALEXIN 500 MG/1
500 CAPSULE ORAL ONCE
Status: COMPLETED | OUTPATIENT
Start: 2024-08-12 | End: 2024-08-12

## 2024-08-12 RX ORDER — 0.9 % SODIUM CHLORIDE 0.9 %
1000 INTRAVENOUS SOLUTION INTRAVENOUS ONCE
Status: COMPLETED | OUTPATIENT
Start: 2024-08-12 | End: 2024-08-12

## 2024-08-12 RX ADMIN — CEPHALEXIN 500 MG: 500 CAPSULE ORAL at 10:36

## 2024-08-12 RX ADMIN — LORAZEPAM 1 MG: 2 INJECTION INTRAMUSCULAR; INTRAVENOUS at 08:54

## 2024-08-12 RX ADMIN — SODIUM CHLORIDE 1000 ML: 9 INJECTION, SOLUTION INTRAVENOUS at 09:50

## 2024-08-12 ASSESSMENT — PAIN - FUNCTIONAL ASSESSMENT: PAIN_FUNCTIONAL_ASSESSMENT: NONE - DENIES PAIN

## 2024-08-12 NOTE — ED NOTES
Pt does not want to self cath- states she is able to urinate and is providing a sample in a urine hat.

## 2024-08-12 NOTE — DISCHARGE INSTRUCTIONS
Keep yourself well-hydrated.  If you have any concerning symptoms come back to the ER.  Follow-up with your primary care doctor and cardiologist.

## 2024-08-12 NOTE — ED NOTES
Pt to ED from home with c/o hyperglycemia (reports 180s at home) and \"feeling weird\". Pt reports hx of  maker and 3 stents. Pt states she took a nitro at home d/t feeling weird, but denies chest pain. Pt denies any issues with sob, chest pain, states she has only had coffee today and is worried about her hyperglycemia. Pt is resting on ED stretcher connected to cardiac monitor, EKG done, IV placed, Dr. Goodson at bedside. Pt is A&OX4, breathing is equal and non labored.

## 2024-08-12 NOTE — ED NOTES
Pt to bathroom w intermittent cath- self caths at home.   Ambulatory unassisted with steady gait.

## 2024-08-13 LAB
MICROORGANISM SPEC CULT: ABNORMAL
SPECIMEN DESCRIPTION: ABNORMAL

## 2024-08-13 NOTE — ED PROVIDER NOTES
Laterality Date    ANKLE FRACTURE SURGERY Left 2007    then hardware removed in 2011    ANUS SURGERY  1972    anal pullthrough    ARTHROPLASTY Right 11/08/2019    ARTHROPLASTY RIGHT 2ND DIGIT WITH WEIL OSTEOTOMY RIGHT performed by Radha Dennis DPM at [facility]    CORONARY ANGIOPLASTY WITH STENT PLACEMENT  09/06/2018    STENT X2  -  Xience Alpine ([employer]) 3T Safe Immed.    CORONARY ANGIOPLASTY WITH STENT PLACEMENT  02/18/2023    KNEE ARTHROSCOPY Left 1994, 2001, 2007    meniscal tear, lateral release, scraped arthritis    OVARY REMOVAL Left     mass on ovary    SPINE SURGERY  untethered with laminectomy 1994, 2003    VAGINA RECONSTRUCTION SURGERY  1986     CURRENT MEDICATIONS       Discharge Medication List as of 8/12/2024 10:32 AM        CONTINUE these medications which have NOT CHANGED    Details   metoprolol tartrate (LOPRESSOR) 25 MG tablet take 1/2 tablet by mouth twice a day, Disp-90 tablet, R-0Normal      BRILINTA 90 MG TABS tablet TAKE 1 TABLET BY MOUTH TWICE A DAY, Disp-60 tablet, R-10Normal      lisinopril (PRINIVIL;ZESTRIL) 5 MG tablet TAKE ONE TABLET BY MOUTH TWICE A DAY, Disp-180 tablet, R-3Normal      rosuvastatin (CRESTOR) 40 MG tablet TAKE ONE TABLET BY MOUTH DAILY, Disp-90 tablet, R-3Normal      !! levothyroxine (SYNTHROID) 200 MCG tablet Take 1 tablet by mouth DailyHistorical Med      metFORMIN (GLUCOPHAGE) 500 MG tablet 2 times dailyHistorical Med      ondansetron (ZOFRAN-ODT) 4 MG disintegrating tablet Take 1 tablet by mouth every 8 hours as needed for Nausea or Vomiting, Disp-10 tablet, R-0Normal      dicyclomine (BENTYL) 10 MG capsule Take 1 capsule by mouth 3 times daily as needed (abdominal cramping, diarrhea), Disp-10 capsule, R-0Normal      pantoprazole (PROTONIX) 20 MG tablet Take 1 tablet by mouth dailyHistorical Med      nitroGLYCERIN (NITROSTAT) 0.4 MG SL tablet Place 1 tablet under tongue upon chest pain, wait 5 minutes and may repeat up to 3 doses in 15 minutes. Do not crush or break. If

## 2024-08-14 LAB
EKG ATRIAL RATE: 71 BPM
EKG P AXIS: 76 DEGREES
EKG P-R INTERVAL: 156 MS
EKG Q-T INTERVAL: 372 MS
EKG QRS DURATION: 92 MS
EKG QTC CALCULATION (BAZETT): 404 MS
EKG R AXIS: 31 DEGREES
EKG T AXIS: 62 DEGREES
EKG VENTRICULAR RATE: 71 BPM

## 2024-08-14 RX ORDER — CEPHALEXIN 500 MG/1
500 CAPSULE ORAL 4 TIMES DAILY
Qty: 28 CAPSULE | Refills: 0 | Status: SHIPPED | OUTPATIENT
Start: 2024-08-14 | End: 2024-08-21

## (undated) DEVICE — Device

## (undated) DEVICE — GLOVE SURG SZ 75 L12IN FNGR THK87MIL WHT LTX FREE

## (undated) DEVICE — TRANSFER SET 3": Brand: MEDLINE INDUSTRIES, INC.

## (undated) DEVICE — GOWN,AURORA,NONRNF,XL,30/CS: Brand: MEDLINE

## (undated) DEVICE — PADDING UNDERCAST W4INXL4YD COT FBR LO LINTING WYTEX

## (undated) DEVICE — YANKAUER,FLEXIBLE HANDLE,REGLR CAPACITY: Brand: MEDLINE INDUSTRIES, INC.

## (undated) DEVICE — GLOVE SURG SZ 7 L12IN FNGR THK87MIL WHT LTX FREE

## (undated) DEVICE — GLOVE SURG SZ 65 L12IN FNGR THK87MIL WHT LTX FREE

## (undated) DEVICE — SKIN PREP TRAY W/CHG: Brand: MEDLINE INDUSTRIES, INC.

## (undated) DEVICE — GOWN,SIRUS,NON REINFRCD,LARGE,SET IN SL: Brand: MEDLINE

## (undated) DEVICE — CHLORAPREP 26ML ORANGE

## (undated) DEVICE — TOURNIQUET CUF BLD PRESSURE 4X18 IN 2 PRT SINGLE BLDR RED

## (undated) DEVICE — TOWEL,OR,DSP,ST,BLUE,DLX,XR,4/PK,20PK/CS: Brand: MEDLINE

## (undated) DEVICE — DUP USE 394429 BLADE SAW SAG OSCIL MED NAR 5.5MM

## (undated) DEVICE — GLOVE SURG SZ 7 L12IN FNGR THK79MIL GRN LTX FREE

## (undated) DEVICE — TUBING, SUCTION, 1/4" X 12', STRAIGHT: Brand: MEDLINE

## (undated) DEVICE — INTENDED FOR TISSUE SEPARATION, AND OTHER PROCEDURES THAT REQUIRE A SHARP SURGICAL BLADE TO PUNCTURE OR CUT.: Brand: BARD-PARKER ® CARBON RIB-BACK BLADES